# Patient Record
Sex: MALE | Race: WHITE | NOT HISPANIC OR LATINO | Employment: OTHER | URBAN - NONMETROPOLITAN AREA
[De-identification: names, ages, dates, MRNs, and addresses within clinical notes are randomized per-mention and may not be internally consistent; named-entity substitution may affect disease eponyms.]

---

## 2022-05-16 ENCOUNTER — APPOINTMENT (EMERGENCY)
Dept: RADIOLOGY | Facility: HOSPITAL | Age: 82
End: 2022-05-16
Payer: COMMERCIAL

## 2022-05-16 ENCOUNTER — HOSPITAL ENCOUNTER (EMERGENCY)
Facility: HOSPITAL | Age: 82
Discharge: HOME | End: 2022-05-16
Attending: EMERGENCY MEDICINE | Admitting: EMERGENCY MEDICINE
Payer: COMMERCIAL

## 2022-05-16 VITALS
HEART RATE: 85 BPM | TEMPERATURE: 98.8 F | WEIGHT: 297 LBS | SYSTOLIC BLOOD PRESSURE: 152 MMHG | DIASTOLIC BLOOD PRESSURE: 70 MMHG | OXYGEN SATURATION: 94 % | RESPIRATION RATE: 16 BRPM

## 2022-05-16 DIAGNOSIS — J06.9 UPPER RESPIRATORY TRACT INFECTION, UNSPECIFIED TYPE: Primary | ICD-10-CM

## 2022-05-16 LAB
FLUAV RNA RESP QL NAA+PROBE: NEGATIVE
FLUBV RNA RESP QL NAA+PROBE: NEGATIVE
RSV RNA RESP QL NAA+PROBE: NEGATIVE
SARS-COV-2 RNA RESP QL NAA+PROBE: NEGATIVE

## 2022-05-16 PROCEDURE — 71045 X-RAY EXAM CHEST 1 VIEW: CPT

## 2022-05-16 PROCEDURE — 99284 EMERGENCY DEPT VISIT MOD MDM: CPT

## 2022-05-16 PROCEDURE — 0241U HB NFCT DS VIR RESP RNA 4 TRGT: CPT | Performed by: EMERGENCY MEDICINE

## 2022-05-16 PROCEDURE — 99285 EMERGENCY DEPT VISIT HI MDM: CPT | Performed by: EMERGENCY MEDICINE

## 2022-05-16 RX ORDER — LISINOPRIL 10 MG/1
10 TABLET ORAL DAILY
COMMUNITY
Start: 2021-11-03 | End: 2022-11-03

## 2022-05-16 RX ORDER — ASPIRIN 81 MG/1
81 TABLET ORAL DAILY
COMMUNITY

## 2022-05-16 NOTE — ED PROVIDER NOTES
History  Chief Complaint   Patient presents with    Fever - 75 years or older     Fever, arthralgia, beginning last night  Recent covid exposure  Wife report was hallucinating last night      Patient recently exposed to COVID positive family member complains of subjective fevers, warmth, generalized weakness, body aches since last night  States mostly symptoms are resolved  Presents to ED for COVID testing      History provided by:  Patient and spouse   used: No    Fever - 75 years or older  Temp source:  Subjective  Severity:  Moderate  Onset quality:  Gradual  Timing:  Constant  Progression:  Improving  Chronicity:  New  Relieved by:  Nothing  Worsened by:  Nothing  Ineffective treatments:  None tried  Associated symptoms: chills and myalgias    Associated symptoms: no chest pain, no cough, no diarrhea, no dysuria, no ear pain, no headaches, no nausea, no rash, no sore throat and no vomiting        Prior to Admission Medications   Prescriptions Last Dose Informant Patient Reported? Taking?   aspirin (ECOTRIN LOW STRENGTH) 81 mg EC tablet   Yes Yes   Sig: Take 81 mg by mouth in the morning  lisinopril (ZESTRIL) 10 mg tablet   Yes Yes   Sig: Take 10 mg by mouth in the morning  metFORMIN (GLUCOPHAGE) 1000 MG tablet   Yes Yes   Sig: Take 1,000 mg by mouth in the morning and 1,000 mg before bedtime  Facility-Administered Medications: None       Past Medical History:   Diagnosis Date    Diabetes mellitus ([facility] [address] )     Hypertension        History reviewed  No pertinent surgical history  History reviewed  No pertinent family history  I have reviewed and agree with the history as documented      E-Cigarette/Vaping     E-Cigarette/Vaping Substances     Social History     Tobacco Use    Smoking status: Former Smoker    Smokeless tobacco: Never Used   Substance Use Topics    Alcohol use: Not Currently    Drug use: Not Currently       Review of Systems   Constitutional: Positive for chills, fatigue and fever  HENT: Negative for ear pain, hearing loss, sore throat, trouble swallowing and voice change  Eyes: Negative for pain and discharge  Respiratory: Negative for cough, shortness of breath and wheezing  Cardiovascular: Negative for chest pain and palpitations  Gastrointestinal: Negative for abdominal pain, blood in stool, constipation, diarrhea, nausea and vomiting  Genitourinary: Negative for dysuria, flank pain, frequency and hematuria  Musculoskeletal: Positive for myalgias  Negative for joint swelling, neck pain and neck stiffness  Skin: Negative for rash and wound  Neurological: Negative for dizziness, seizures, syncope, facial asymmetry and headaches  Psychiatric/Behavioral: Negative for hallucinations, self-injury and suicidal ideas  All other systems reviewed and are negative  Physical Exam  Physical Exam  Vitals and nursing note reviewed  Constitutional:       General: He is not in acute distress  Appearance: He is well-developed  He is obese  HENT:      Head: Normocephalic and atraumatic  Right Ear: External ear normal       Left Ear: External ear normal    Eyes:      General: No scleral icterus  Right eye: No discharge  Left eye: No discharge  Extraocular Movements: Extraocular movements intact  Conjunctiva/sclera: Conjunctivae normal    Cardiovascular:      Rate and Rhythm: Normal rate and regular rhythm  Heart sounds: Normal heart sounds  No murmur heard  Pulmonary:      Effort: Pulmonary effort is normal       Breath sounds: Normal breath sounds  No wheezing or rales  Abdominal:      General: Bowel sounds are normal  There is no distension  Palpations: Abdomen is soft  Tenderness: There is no abdominal tenderness  There is no guarding or rebound  Musculoskeletal:         General: No deformity  Normal range of motion  Cervical back: Normal range of motion and neck supple     Skin:     General: Skin is warm and dry  Findings: No rash  Neurological:      General: No focal deficit present  Mental Status: He is alert and oriented to person, place, and time  Cranial Nerves: No cranial nerve deficit  Psychiatric:         Mood and Affect: Mood normal          Behavior: Behavior normal          Thought Content: Thought content normal          Judgment: Judgment normal          Vital Signs  ED Triage Vitals   Temperature Pulse Respirations Blood Pressure SpO2   05/16/22 1409 05/16/22 1409 05/16/22 1409 05/16/22 1409 05/16/22 1409   98 8 °F (37 1 °C) 95 16 160/89 93 %      Temp Source Heart Rate Source Patient Position - Orthostatic VS BP Location FiO2 (%)   05/16/22 1409 05/16/22 1445 05/16/22 1409 05/16/22 1409 --   Temporal Monitor Lying Right arm       Pain Score       05/16/22 1409       No Pain           Vitals:    05/16/22 1409 05/16/22 1445   BP: 160/89 152/70   Pulse: 95 85   Patient Position - Orthostatic VS: Lying Lying         Visual Acuity  Visual Acuity    Flowsheet Row Most Recent Value   L Pupil Size (mm) 3   R Pupil Size (mm) 3          ED Medications  Medications - No data to display    Diagnostic Studies  Results Reviewed     Procedure Component Value Units Date/Time    COVID19, Influenza A/B, RSV PCR, [facility] [[id number]]  (Normal) Collected: 05/16/22 1446    Lab Status: Final result Specimen: Nares from Nasopharyngeal Swab Updated: 05/16/22 1534     SARS-CoV-2 Negative     INFLUENZA A PCR Negative     INFLUENZA B PCR Negative     RSV PCR Negative    Narrative:      FOR PEDIATRIC PATIENTS - copy/paste COVID Guidelines URL to browser: [email or web address]  [employer]    SARS-CoV-2 assay is a Nucleic Acid Amplification assay intended for the  qualitative detection of nucleic acid from SARS-CoV-2 in nasopharyngeal  swabs  Results are for the presumptive identification of SARS-CoV-2 RNA      Positive results are indicative of infection with SARS-CoV-2, the virus  causing COVID-19, but do not rule out bacterial infection or co-infection  with other viruses  Laboratories within the [place] and its  territories are required to report all positive results to the appropriate  public health authorities  Negative results do not preclude SARS-CoV-2  infection and should not be used as the sole basis for treatment or other  patient management decisions  Negative results must be combined with  clinical observations, patient history, and epidemiological information  This test has not been FDA cleared or approved  This test has been authorized by FDA under an Emergency Use Authorization  (EUA)  This test is only authorized for the duration of time the  declaration that circumstances exist justifying the authorization of the  emergency use of an in vitro diagnostic tests for detection of SARS-CoV-2  virus and/or diagnosis of COVID-19 infection under section 564(b)(1) of  the Act, 21 U  S C  771DVV-1(D)(2), unless the authorization is terminated  or revoked sooner  The test has been validated but independent review by FDA  and CLIA is pending  Test performed using [employer] Gene[employer]pert: This RT-PCR assay targets N2,  a region unique to SARS-CoV-2  A conserved region in the E-gene was chosen  for pan-Sarbecovirus detection which includes SARS-CoV-2  XR chest portable   ED Interpretation by Gabriela Guzman MD (05/16 8001)   No acute finding                 Procedures  Procedures         ED Course  ED Course as of 05/16/22 1541   Mon May 16, 2022   1459 No hypoxia, hypotension, tachycardia  Patient has x-ray without acute infiltrates noted  Will await result of COVID testing, plan on discharge  [address] [facility] negative  Stable for discharge  Patient advised to test at home again tomorrow to confirm negative status                                 SBIRT 22yo+    Flowsheet Row Most Recent Value   SBIRT (23 yo +)    In order to provide better care to our patients, we are screening all of our patients for alcohol and drug use  Would it be okay to ask you these screening questions? Yes Filed at: 05/16/2022 1440   Initial Alcohol Screen: US AUDIT-C     1  How often do you have a drink containing alcohol? 0 Filed at: 05/16/2022 1440   2  How many drinks containing alcohol do you have on a typical day you are drinking? 0 Filed at: 05/16/2022 1440   3b  FEMALE Any Age, or MALE 65+: How often do you have 4 or more drinks on one occassion? 0 Filed at: 05/16/2022 1440   Audit-C Score 0 Filed at: 05/16/2022 1440   SENTHIL: How many times in the past year have you    Used an illegal drug or used a prescription medication for non-medical reasons? Never Filed at: 05/16/2022 1440                    MDM  Number of Diagnoses or Management Options     Amount and/or Complexity of Data Reviewed  Clinical lab tests: ordered and reviewed  Tests in the radiology section of CPT®: ordered and reviewed        Disposition  Final diagnoses:   Upper respiratory tract infection, unspecified type     Time reflects when diagnosis was documented in both MDM as applicable and the Disposition within this note     Time User Action Codes Description Comment    5/16/2022  3:41 PM Edwige Mckay Add [J06 9] Upper respiratory tract infection, unspecified type       ED Disposition     ED Disposition   Discharge    Condition   Stable    Date/Time   Mon May 16, 2022  3:41 PM    Comment   Alex Route discharge to home/self care  Follow-up Information     Follow up With Specialties Details Why [address] Corrine Rossi MD Family Medicine   [address] [place] [address]  [address]  [address] [place] [address]  [phone]            Patient's Medications   Discharge Prescriptions    No medications on file       No discharge procedures on file      PDMP Review     None          ED Provider  Electronically Signed by           Shy Julien MD  05/16/22 5215

## 2022-11-28 ENCOUNTER — HOSPITAL ENCOUNTER (OUTPATIENT)
Dept: RADIOLOGY | Facility: CLINIC | Age: 82
Discharge: HOME | End: 2022-11-28

## 2022-11-28 ENCOUNTER — OFFICE VISIT (OUTPATIENT)
Dept: URGENT CARE | Facility: CLINIC | Age: 82
End: 2022-11-28

## 2022-11-28 VITALS
BODY MASS INDEX: 42.37 KG/M2 | SYSTOLIC BLOOD PRESSURE: 164 MMHG | OXYGEN SATURATION: 92 % | HEIGHT: 70 IN | DIASTOLIC BLOOD PRESSURE: 79 MMHG | RESPIRATION RATE: 16 BRPM | HEART RATE: 71 BPM | TEMPERATURE: 97.1 F | WEIGHT: 296 LBS

## 2022-11-28 DIAGNOSIS — B34.9 VIRAL SYNDROME: Primary | ICD-10-CM

## 2022-11-28 DIAGNOSIS — R50.9 FEVER, UNSPECIFIED FEVER CAUSE: ICD-10-CM

## 2022-11-28 RX ORDER — ALBUTEROL SULFATE 90 UG/1
2 AEROSOL, METERED RESPIRATORY (INHALATION) EVERY 6 HOURS PRN
Qty: 8.5 G | Refills: 0 | Status: SHIPPED | OUTPATIENT
Start: 2022-11-28

## 2022-11-28 NOTE — PROGRESS NOTES
Pt unsure of medications and does not present with medication list to this visit; medication reconciliation unable to be performed at this time

## 2022-11-28 NOTE — PROGRESS NOTES
[facility]        NAME: Yee Zuniga is a 78 y o  male  : 1940    MRN: [id number]  DATE: 2022  TIME: 1:21 PM    Assessment and Plan   Viral syndrome [B34 9]  1  Viral syndrome  Cov/Flu-[facility] at [facility]    XR chest pa & lateral    albuterol (ProAir HFA) 90 mcg/act inhaler        Ambulatory pulse ox performed by nursing staff and SPO2 92% RA  Pulmonary chest x-ray read negative for pneumonia, final read pending  COVID/Flu PCR pending  Results will be viewable via [employer]  Follow CDC guidelines for isolation/quarantine until results back and then as appropriate based on final results  Albuterol inhaler prescribed and to be used as needed for wheezing  Patient with history of diabetes and so cannot prescribe oral steroids  Follow-up with PCP in 2-3 days and closely monitor symptoms  Proceed to ER if symptoms worsen  Low threshold for referral to ED  Son verbalized understanding of instructions given  Patient Instructions     Patient Instructions     COVID/Flu PCR pending  Results will be viewable via [employer]  Follow CDC guidelines for isolation/quarantine until results back and then as appropriate  Use albuterol inhaler as prescribed for wheezing/cough/shortness of breath  Continue with supportive measures, OTC Tylenol/Ibuprofen, nasal decongestants/cough suppressants   Cool mist humidifiers, increased fluid intake and rest   Follow up with PCP in 2-3 days  Present to ER if symptoms worsen  Viral Syndrome   AMBULATORY CARE:   Viral syndrome  is a term used for symptoms of an infection caused by a virus  Viruses are spread easily from person to person through the air and on shared items  Signs and symptoms  may start slowly or suddenly and last hours to days  They can be mild to severe and can change over days or hours   You may have any of the following:  · Fever and chills    · A runny or stuffy nose    · Cough, sore throat, or hoarseness    · Headache, or pain and pressure around your eyes    · Muscle aches and joint pain    · Shortness of breath or wheezing    · Abdominal pain, cramps, and diarrhea    · Nausea, vomiting, or loss of appetite    Call your local emergency number (911 in the [address] [facility]) or have someone else call if:   · You have a seizure  · You cannot be woken  · You have chest pain or trouble breathing  Seek care immediately if:   · You have a stiff neck, a bad headache, and sensitivity to light  · You feel weak, dizzy, or confused  · You stop urinating or urinate a lot less than usual     · You cough up blood or thick yellow or green mucus  · You have severe abdominal pain or your abdomen is larger than usual     Call your doctor if:   · Your symptoms do not get better with treatment or get worse after 3 days  · You have a rash or ear pain  · You have burning when you urinate  · You have questions or concerns about your condition or care  Treatment for viral syndrome  may include medicines to manage your symptoms  Antibiotics are not given for a viral infection  You may  need any of the following:  · Acetaminophen  decreases pain and fever  It is available without a doctor's order  Ask how much to take and how often to take it  Follow directions  Read the labels of all other medicines you are using to see if they also contain acetaminophen, or ask your doctor or pharmacist  Acetaminophen can cause liver damage if not taken correctly  Do not use more than 4 grams (4,000 milligrams) total of acetaminophen in one day  · NSAIDs , such as ibuprofen, help decrease swelling, pain, and fever  NSAIDs can cause stomach bleeding or kidney problems in certain people  If you take blood thinner medicine, always ask your healthcare provider if NSAIDs are safe for you  Always read the medicine label and follow directions      · Cold medicine  helps decrease swelling, control a cough, and relieve chest or nasal congestion  · Saline nasal spray  helps decrease nasal congestion  Manage your symptoms:   · Drink liquids as directed to prevent dehydration  Ask how much liquid to drink each day and which liquids are best for you  Ask if you should drink an oral rehydration solution (ORS)  An ORS has the right amounts of water, salts, and sugar you need to replace body fluids  This may help prevent dehydration caused by vomiting or diarrhea  Do not drink liquids with caffeine  Liquids with caffeine can make dehydration worse  · Get plenty of rest to help your body heal   Take naps throughout the day  Ask your healthcare provider when you can return to work and your normal activities  · Use a cool mist humidifier to help you breathe easier  Ask your healthcare provider how to use a cool mist humidifier  · Eat honey or use cough drops for a sore throat  Cough drops are available without a doctor's order  Follow directions for taking cough drops  · Do not smoke or be close to anyone who is smoking  Nicotine and other chemicals in cigarettes and cigars can cause lung damage  Smoking can also delay healing  Ask your healthcare provider for information if you currently smoke and need help to quit  E-cigarettes or smokeless tobacco still contain nicotine  Talk to your healthcare provider before you use these products  Prevent the spread of germs:       1  Wash your hands often  Wash your hands several times each day  Wash after you use the bathroom, change a child's diaper, and before you prepare or eat food  Use soap and water every time  Rub your soapy hands together, lacing your fingers  Wash the front and back of your hands, and in between your fingers  Use the fingers of one hand to scrub under the fingernails of the other hand  Wash for at least 20 seconds  Rinse with warm, running water for several seconds  Then dry your hands with a clean towel or paper towel   Use hand  that contains alcohol if soap and water are not available  Do not touch your eyes, nose, or mouth without washing your hands first          2  Cover a sneeze or cough  Use a tissue that covers your mouth and nose  Throw the tissue away in a trash can right away  Use the bend of your arm if a tissue is not available  Wash your hands well with soap and water or use a hand   3  Stay away from others while you are sick  Avoid crowds as much as possible  4  Ask about vaccines you may need  Talk to your healthcare provider about your vaccine history  He or she will tell you which vaccines you need, and when to get them  ? Get the influenza (flu) vaccine as soon as recommended each year  The flu vaccine is available starting in September or October  Flu viruses change, so it is important to get a flu vaccine every year  ? Get the pneumonia vaccine if recommended  This vaccine is usually recommended every 5 years  Your provider will tell you when to get this vaccine, if needed  Follow up with your doctor as directed:  Write down your questions so you remember to ask them during your visits  © Copyright [employer] 2022 Information is for End User's use only and may not be sold, redistributed or otherwise used for commercial purposes  All illustrations and images included in CareNotes® are the copyrighted property of [employer]  or [address] [place] [address]  The above information is an  only  It is not intended as medical advice for individual conditions or treatments  Talk to your doctor, nurse or pharmacist before following any medical regimen to see if it is safe and effective for you              Chief Complaint     Chief Complaint   Patient presents with   • Cold Like Symptoms     C/o fever, chills, body aches, congestion, and cough for past 10 days; living with residents who tested positive for Influenza A approximately 3 weeks ago         History of Present Illness       39-year-old male with a past medical history significant for HTN and Type II DM presents with his son with complaints productive cough, nasal congestion, body aches, chills, generalized weakness, and fever x10 days  Patient also states some shortness of breath with ambulation  T-max 101  He has had decreased appetite but is drinking well  Some diarrhea but denies any vomiting  Son states that other family members tested positive for influenza about 3 weeks ago  Patient has been taking OTC TheraFlu as well as NyQuil-DayQuil and Tylenol for his symptoms  He is a former smoker  Review of Systems   Review of Systems   Constitutional: Positive for appetite change, chills and fever  HENT: Positive for congestion  Negative for ear discharge, ear pain, sore throat and trouble swallowing  Eyes: Negative for discharge  Respiratory: Positive for cough, shortness of breath and wheezing  Cardiovascular: Negative for chest pain  Gastrointestinal: Positive for diarrhea  Negative for abdominal pain, nausea and vomiting  Musculoskeletal: Positive for myalgias  Skin: Negative for rash  Current Medications       Current Outpatient Medications:   •  albuterol (ProAir HFA) 90 mcg/act inhaler, Inhale 2 puffs every 6 (six) hours as needed for wheezing or shortness of breath, Disp: 8 5 g, Rfl: 0  •  aspirin (ECOTRIN LOW STRENGTH) 81 mg EC tablet, Take 81 mg by mouth in the morning , Disp: , Rfl:   •  lisinopril (ZESTRIL) 10 mg tablet, Take 10 mg by mouth in the morning , Disp: , Rfl:   •  metFORMIN (GLUCOPHAGE) 1000 MG tablet, Take 1,000 mg by mouth in the morning and 1,000 mg before bedtime  , Disp: , Rfl:     Current Allergies     Allergies as of 11/28/2022   • (No Known Allergies)            The following portions of the patient's history were reviewed and updated as appropriate: allergies, current medications, past family history, past medical history, past social history, past surgical history and problem list      Past Medical History:   Diagnosis Date   • Diabetes mellitus ([facility] [address] )    • Hypertension        History reviewed  No pertinent surgical history  Family History   Problem Relation Age of Onset   • No Known Problems Mother    • No Known Problems Father          Medications have been verified  Objective   /79   Pulse 71   Temp (!) 97 1 °F (36 2 °C)   Resp 16   Ht 5' 10" (1 778 m)   Wt 134 kg (296 lb)   SpO2 92% Comment: During ambulation  BMI 42 47 kg/m²   No LMP for male patient  Physical Exam     Physical Exam  Vitals and nursing note reviewed  Constitutional:       General: He is not in acute distress  Appearance: He is not toxic-appearing  HENT:      Head: Normocephalic  Right Ear: Tympanic membrane, ear canal and external ear normal       Left Ear: Tympanic membrane, ear canal and external ear normal       Nose: Congestion present  Mouth/Throat:      Mouth: Mucous membranes are moist       Comments: Thick, green sputum in posterior pharynx  Eyes:      Conjunctiva/sclera: Conjunctivae normal    Cardiovascular:      Rate and Rhythm: Normal rate and regular rhythm  Heart sounds: Normal heart sounds  Pulmonary:      Effort: Pulmonary effort is normal  No accessory muscle usage or respiratory distress  Breath sounds: Examination of the right-upper field reveals wheezing  Examination of the left-upper field reveals wheezing  Examination of the right-lower field reveals wheezing  Examination of the left-lower field reveals wheezing  Wheezing present  Lymphadenopathy:      Cervical: No cervical adenopathy  Skin:     General: Skin is warm and dry  Coloration: Skin is pale  Neurological:      Mental Status: He is alert and oriented to person, place, and time  Gait: Gait is intact     Psychiatric:         Mood and Affect: Mood normal          Behavior: Behavior normal

## 2022-11-28 NOTE — PATIENT INSTRUCTIONS
COVID/Flu PCR pending  Results will be viewable via [employer]  Follow CDC guidelines for isolation/quarantine until results back and then as appropriate  Use albuterol inhaler as prescribed for wheezing/cough/shortness of breath  Continue with supportive measures, OTC Tylenol/Ibuprofen, nasal decongestants/cough suppressants   Cool mist humidifiers, increased fluid intake and rest   Follow up with PCP in 2-3 days  Present to ER if symptoms worsen  Viral Syndrome   AMBULATORY CARE:   Viral syndrome  is a term used for symptoms of an infection caused by a virus  Viruses are spread easily from person to person through the air and on shared items  Signs and symptoms  may start slowly or suddenly and last hours to days  They can be mild to severe and can change over days or hours  You may have any of the following:  Fever and chills    A runny or stuffy nose    Cough, sore throat, or hoarseness    Headache, or pain and pressure around your eyes    Muscle aches and joint pain    Shortness of breath or wheezing    Abdominal pain, cramps, and diarrhea    Nausea, vomiting, or loss of appetite    Call your local emergency number (911 in the [facility]) or have someone else call if:   You have a seizure  You cannot be woken  You have chest pain or trouble breathing  Seek care immediately if:   You have a stiff neck, a bad headache, and sensitivity to light  You feel weak, dizzy, or confused  You stop urinating or urinate a lot less than usual     You cough up blood or thick yellow or green mucus  You have severe abdominal pain or your abdomen is larger than usual     Call your doctor if:   Your symptoms do not get better with treatment or get worse after 3 days  You have a rash or ear pain  You have burning when you urinate  You have questions or concerns about your condition or care  Treatment for viral syndrome  may include medicines to manage your symptoms   Antibiotics are not given for a viral infection  You may  need any of the following:  Acetaminophen  decreases pain and fever  It is available without a doctor's order  Ask how much to take and how often to take it  Follow directions  Read the labels of all other medicines you are using to see if they also contain acetaminophen, or ask your doctor or pharmacist  Acetaminophen can cause liver damage if not taken correctly  Do not use more than 4 grams (4,000 milligrams) total of acetaminophen in one day  NSAIDs , such as ibuprofen, help decrease swelling, pain, and fever  NSAIDs can cause stomach bleeding or kidney problems in certain people  If you take blood thinner medicine, always ask your healthcare provider if NSAIDs are safe for you  Always read the medicine label and follow directions  Cold medicine  helps decrease swelling, control a cough, and relieve chest or nasal congestion  Saline nasal spray  helps decrease nasal congestion  Manage your symptoms:   Drink liquids as directed to prevent dehydration  Ask how much liquid to drink each day and which liquids are best for you  Ask if you should drink an oral rehydration solution (ORS)  An ORS has the right amounts of water, salts, and sugar you need to replace body fluids  This may help prevent dehydration caused by vomiting or diarrhea  Do not drink liquids with caffeine  Liquids with caffeine can make dehydration worse  Get plenty of rest to help your body heal   Take naps throughout the day  Ask your healthcare provider when you can return to work and your normal activities  Use a cool mist humidifier to help you breathe easier  Ask your healthcare provider how to use a cool mist humidifier  Eat honey or use cough drops for a sore throat  Cough drops are available without a doctor's order  Follow directions for taking cough drops  Do not smoke or be close to anyone who is smoking  Nicotine and other chemicals in cigarettes and cigars can cause lung damage   Smoking can also delay healing  Ask your healthcare provider for information if you currently smoke and need help to quit  E-cigarettes or smokeless tobacco still contain nicotine  Talk to your healthcare provider before you use these products  Prevent the spread of germs:       Wash your hands often  Wash your hands several times each day  Wash after you use the bathroom, change a child's diaper, and before you prepare or eat food  Use soap and water every time  Rub your soapy hands together, lacing your fingers  Wash the front and back of your hands, and in between your fingers  Use the fingers of one hand to scrub under the fingernails of the other hand  Wash for at least 20 seconds  Rinse with warm, running water for several seconds  Then dry your hands with a clean towel or paper towel  Use hand  that contains alcohol if soap and water are not available  Do not touch your eyes, nose, or mouth without washing your hands first          Cover a sneeze or cough  Use a tissue that covers your mouth and nose  Throw the tissue away in a trash can right away  Use the bend of your arm if a tissue is not available  Wash your hands well with soap and water or use a hand   Stay away from others while you are sick  Avoid crowds as much as possible  Ask about vaccines you may need  Talk to your healthcare provider about your vaccine history  He or she will tell you which vaccines you need, and when to get them  Get the influenza (flu) vaccine as soon as recommended each year  The flu vaccine is available starting in September or October  Flu viruses change, so it is important to get a flu vaccine every year  Get the pneumonia vaccine if recommended  This vaccine is usually recommended every 5 years  Your provider will tell you when to get this vaccine, if needed  Follow up with your doctor as directed:  Write down your questions so you remember to ask them during your visits    © Copyright [employer] 2022 Information is for [employer] use only and may not be sold, redistributed or otherwise used for commercial purposes  All illustrations and images included in CareNotes® are the copyrighted property of [employer]  or [employer]  The above information is an  only  It is not intended as medical advice for individual conditions or treatments  Talk to your doctor, nurse or pharmacist before following any medical regimen to see if it is safe and effective for you

## 2022-11-29 LAB
FLUAV RNA RESP QL NAA+PROBE: POSITIVE
FLUBV RNA RESP QL NAA+PROBE: NEGATIVE
SARS-COV-2 RNA RESP QL NAA+PROBE: NEGATIVE

## 2023-08-17 ENCOUNTER — OFFICE VISIT (OUTPATIENT)
Dept: URGENT CARE | Facility: CLINIC | Age: 83
End: 2023-08-17
Payer: MEDICARE

## 2023-08-17 VITALS
HEIGHT: 70 IN | TEMPERATURE: 98 F | OXYGEN SATURATION: 99 % | DIASTOLIC BLOOD PRESSURE: 90 MMHG | BODY MASS INDEX: 41.52 KG/M2 | HEART RATE: 78 BPM | RESPIRATION RATE: 16 BRPM | SYSTOLIC BLOOD PRESSURE: 134 MMHG | WEIGHT: 290 LBS

## 2023-08-17 DIAGNOSIS — H69.93 DISORDER OF BOTH EUSTACHIAN TUBES: Primary | ICD-10-CM

## 2023-08-17 PROCEDURE — G0463 HOSPITAL OUTPT CLINIC VISIT: HCPCS

## 2023-08-17 PROCEDURE — 99213 OFFICE O/P EST LOW 20 MIN: CPT

## 2023-08-17 RX ORDER — VALSARTAN 40 MG/1
TABLET ORAL
COMMUNITY
Start: 2022-06-15

## 2023-08-17 RX ORDER — METOPROLOL SUCCINATE 50 MG/1
TABLET, EXTENDED RELEASE ORAL
COMMUNITY
Start: 2023-08-16

## 2023-08-17 RX ORDER — LEVOTHYROXINE SODIUM 0.2 MG/1
TABLET ORAL
COMMUNITY
Start: 2023-06-07

## 2023-08-17 RX ORDER — TAMSULOSIN HYDROCHLORIDE 0.4 MG/1
0.4 CAPSULE ORAL DAILY
COMMUNITY

## 2023-08-17 RX ORDER — OMEPRAZOLE 40 MG/1
CAPSULE, DELAYED RELEASE ORAL
COMMUNITY
Start: 2023-08-02

## 2023-08-17 NOTE — PROGRESS NOTES
[facility]        NAME: Jessica Ventura is a 78 y.o. male  : 1940    MRN: [id number]  DATE: 2023  TIME: 4:54 PM    Assessment and Plan   Disorder of both eustachian tubes [H69.93]  1. Disorder of both eustachian tubes          No sign of OM, OE, or cerumen impactions. Symptoms likely related to eustachian tube dysfunction following previous viral illness. Encouraged continued supportive measures, including daily Flonase nasal spray. Follow up with ENT as scheduled. Follow up with PCP in 3-5 days or proceed to emergency department for worsening symptoms. Patient and wife verbalized understanding of instructions given. Patient Instructions     Patient Instructions       No signs of ear infection or earwax blockage  Use daily Flonase nasal spray  Follow up with ENT as scheduled   Follow up with PCP in 3-5 days. Proceed to  ER if symptoms worsen. Eustachian Tube Dysfunction   AMBULATORY CARE:   Eustachian tube dysfunction (ETD)  is a condition that prevents your eustachian tubes from opening properly. It can also cause them to become blocked. Eustachian tubes connect your middle ear to the back of your nose and throat. These tubes open and allow air to flow in and out when you sneeze, swallow, or yawn. Common signs and symptoms include the following:   • Fullness or pressure in your ears    • Muffled hearing, or a feeling you are hearing under water or have clogged ears    • Pain in one or both ears    • Ringing in your ears    • Popping, crackling, or clicking feeling in your ears    • Trouble keeping your balance    Call your doctor or otolaryngologist if:   • Your symptoms do not improve or get worse. • You have a fever. • You have any hearing loss. • You have questions or concerns about your condition or care. Treatment:  ETD may get better on its own without any treatment.  If it continues, you may need any of the following:  • Swallow, yawn, or chew gum  to help open your eustachian tubes. Your healthcare provider may also recommend you blow with your mouth shut and your nostrils pinched closed. • Air pressure devices  push air into your nose and eustachian tubes to help relieve air pressure in your ear. • Treatment for allergies  such as decongestants, antihistamines, and nasal steroids may improve ETD. They may help decrease swelling of the eustachian tubes. • A myringotomy  is surgery to make a hole in your eardrum. The hole relieves pressure and lets fluid drain from your ear. A pressure equalizing (PE) tube may be used to keep the hole open and to help drain fluid. • Tuboplasty  is a procedure to widen your eustachian tubes. Follow up with your doctor or otolaryngologist as directed:  Write down your questions so you remember to ask them during your visits. © Copyright [employer] 2022 Information is for End User's use only and may not be sold, redistributed or otherwise used for commercial purposes. The above information is an  only. It is not intended as medical advice for individual conditions or treatments. Talk to your doctor, nurse or pharmacist before following any medical regimen to see if it is safe and effective for you. Chief Complaint     Chief Complaint   Patient presents with   • ears blocked     Hearing normally bad but now he cannot hear anything         History of Present Illness       78-year-old male with a past medical history significant for type II DM and hypertension presents with complaints of decreased hearing x1 month. Wife reporting poor hearing at baseline however acutely worsened following viral illness with symptoms such as nasal congestion and cough. He denies URI symptoms at present time or ear drainage. He is scheduled to see ENT next month. Review of Systems   Review of Systems   Constitutional: Negative for chills and fever. HENT: Positive for hearing loss.  Negative for congestion, ear discharge, ear pain, rhinorrhea, sore throat, trouble swallowing and voice change. Eyes: Negative for discharge. Respiratory: Negative for cough and shortness of breath. Cardiovascular: Negative for chest pain. Gastrointestinal: Negative for abdominal pain, diarrhea, nausea and vomiting. Musculoskeletal: Negative for myalgias. Skin: Negative for rash. Neurological: Negative for dizziness, light-headedness and headaches. Current Medications       Current Outpatient Medications:   •  albuterol (ProAir HFA) 90 mcg/act inhaler, Inhale 2 puffs every 6 (six) hours as needed for wheezing or shortness of breath, Disp: 8.5 g, Rfl: 0  •  aspirin (ECOTRIN LOW STRENGTH) 81 mg EC tablet, Take 81 mg by mouth in the morning., Disp: , Rfl:   •  levothyroxine 200 mcg tablet, , Disp: , Rfl:   •  lisinopril (ZESTRIL) 10 mg tablet, Take 10 mg by mouth in the morning., Disp: , Rfl:   •  metFORMIN (GLUCOPHAGE) 1000 MG tablet, Take 1,000 mg by mouth in the morning and 1,000 mg before bedtime. , Disp: , Rfl:   •  metoprolol succinate (TOPROL-XL) 50 mg 24 hr tablet, , Disp: , Rfl:   •  omeprazole (PriLOSEC) 40 MG capsule, , Disp: , Rfl:   •  tamsulosin (FLOMAX) 0.4 mg, Take 0.4 mg by mouth daily, Disp: , Rfl:   •  valsartan (DIOVAN) 40 mg tablet, , Disp: , Rfl:     Current Allergies     Allergies as of 08/17/2023   • (No Known Allergies)            The following portions of the patient's history were reviewed and updated as appropriate: allergies, current medications, past family history, past medical history, past social history, past surgical history and problem list.     Past Medical History:   Diagnosis Date   • Diabetes mellitus ([address])    • GERD (gastroesophageal reflux disease)    • Hypertension        Past Surgical History:   Procedure Laterality Date   • HERNIA REPAIR         Family History   Problem Relation Age of Onset   • Heart disease Mother    • Heart disease Father          Medications have been verified. Objective   /90   Pulse 78   Temp 98 °F (36.7 °C)   Resp 16   Ht 5' 10" (1.778 m)   Wt 132 kg (290 lb)   SpO2 99%   BMI 41.61 kg/m²   No LMP for male patient. Physical Exam     Physical Exam  Vitals and nursing note reviewed. Constitutional:       General: He is not in acute distress. Appearance: He is not toxic-appearing. HENT:      Head: Normocephalic. Right Ear: Ear canal and external ear normal. A middle ear effusion is present. There is no impacted cerumen. Left Ear: Ear canal and external ear normal. A middle ear effusion is present. There is no impacted cerumen. Nose: Nose normal.      Mouth/Throat:      Mouth: Mucous membranes are moist.      Pharynx: Oropharynx is clear. Eyes:      Conjunctiva/sclera: Conjunctivae normal.   Cardiovascular:      Rate and Rhythm: Normal rate and regular rhythm. Heart sounds: Normal heart sounds. Pulmonary:      Effort: Pulmonary effort is normal. No respiratory distress. Breath sounds: Normal breath sounds. No stridor. No wheezing, rhonchi or rales. Skin:     General: Skin is warm and dry. Neurological:      Mental Status: He is alert and oriented to person, place, and time. Gait: Gait is intact.    Psychiatric:         Mood and Affect: Mood normal.         Behavior: Behavior normal.

## 2023-08-17 NOTE — PATIENT INSTRUCTIONS
No signs of ear infection or earwax blockage  Use daily Flonase nasal spray  Follow up with ENT as scheduled   Follow up with PCP in 3-5 days. Proceed to  ER if symptoms worsen. Eustachian Tube Dysfunction   AMBULATORY CARE:   Eustachian tube dysfunction (ETD)  is a condition that prevents your eustachian tubes from opening properly. It can also cause them to become blocked. Eustachian tubes connect your middle ear to the back of your nose and throat. These tubes open and allow air to flow in and out when you sneeze, swallow, or yawn. Common signs and symptoms include the following:   Fullness or pressure in your ears    Muffled hearing, or a feeling you are hearing under water or have clogged ears    Pain in one or both ears    Ringing in your ears    Popping, crackling, or clicking feeling in your ears    Trouble keeping your balance    Call your doctor or otolaryngologist if:   Your symptoms do not improve or get worse. You have a fever. You have any hearing loss. You have questions or concerns about your condition or care. Treatment:  ETD may get better on its own without any treatment. If it continues, you may need any of the following:  Swallow, yawn, or chew gum  to help open your eustachian tubes. Your healthcare provider may also recommend you blow with your mouth shut and your nostrils pinched closed. Air pressure devices  push air into your nose and eustachian tubes to help relieve air pressure in your ear. Treatment for allergies  such as decongestants, antihistamines, and nasal steroids may improve ETD. They may help decrease swelling of the eustachian tubes. A myringotomy  is surgery to make a hole in your eardrum. The hole relieves pressure and lets fluid drain from your ear. A pressure equalizing (PE) tube may be used to keep the hole open and to help drain fluid. Tuboplasty  is a procedure to widen your eustachian tubes.     Follow up with your doctor or otolaryngologist as directed:  Write down your questions so you remember to ask them during your visits. © Copyright [employer] 2022 Information is for End User's use only and may not be sold, redistributed or otherwise used for commercial purposes. The above information is an  only. It is not intended as medical advice for individual conditions or treatments. Talk to your doctor, nurse or pharmacist before following any medical regimen to see if it is safe and effective for you.

## 2023-11-27 ENCOUNTER — HOSPITAL ENCOUNTER (EMERGENCY)
Facility: HOSPITAL | Age: 83
Discharge: HOME | End: 2023-11-27
Attending: EMERGENCY MEDICINE
Payer: MEDICARE

## 2023-11-27 ENCOUNTER — APPOINTMENT (EMERGENCY)
Dept: RADIOLOGY | Facility: HOSPITAL | Age: 83
End: 2023-11-27
Payer: MEDICARE

## 2023-11-27 VITALS
TEMPERATURE: 97.5 F | BODY MASS INDEX: 42.33 KG/M2 | OXYGEN SATURATION: 94 % | SYSTOLIC BLOOD PRESSURE: 148 MMHG | DIASTOLIC BLOOD PRESSURE: 75 MMHG | HEART RATE: 56 BPM | RESPIRATION RATE: 18 BRPM | WEIGHT: 295 LBS

## 2023-11-27 DIAGNOSIS — J40 BRONCHITIS: ICD-10-CM

## 2023-11-27 DIAGNOSIS — I10 HYPERTENSION: ICD-10-CM

## 2023-11-27 DIAGNOSIS — R05.1 ACUTE COUGH: Primary | ICD-10-CM

## 2023-11-27 PROCEDURE — 71046 X-RAY EXAM CHEST 2 VIEWS: CPT

## 2023-11-27 PROCEDURE — 99283 EMERGENCY DEPT VISIT LOW MDM: CPT

## 2023-11-27 PROCEDURE — 0241U HB NFCT DS VIR RESP RNA 4 TRGT: CPT | Performed by: EMERGENCY MEDICINE

## 2023-11-27 PROCEDURE — 99284 EMERGENCY DEPT VISIT MOD MDM: CPT | Performed by: EMERGENCY MEDICINE

## 2023-11-27 RX ORDER — AZITHROMYCIN 250 MG/1
500 TABLET, FILM COATED ORAL ONCE
Status: COMPLETED | OUTPATIENT
Start: 2023-11-27 | End: 2023-11-27

## 2023-11-27 RX ORDER — METOPROLOL TARTRATE 50 MG/1
50 TABLET, FILM COATED ORAL ONCE
Status: COMPLETED | OUTPATIENT
Start: 2023-11-27 | End: 2023-11-27

## 2023-11-27 RX ORDER — AZITHROMYCIN 250 MG/1
250 TABLET, FILM COATED ORAL EVERY 24 HOURS
Qty: 4 TABLET | Refills: 0 | Status: SHIPPED | OUTPATIENT
Start: 2023-11-27 | End: 2023-12-01

## 2023-11-27 RX ORDER — BENZONATATE 200 MG/1
200 CAPSULE ORAL 3 TIMES DAILY PRN
Qty: 6 CAPSULE | Refills: 0 | Status: SHIPPED | OUTPATIENT
Start: 2023-11-27 | End: 2023-11-29

## 2023-11-27 RX ADMIN — AZITHROMYCIN 500 MG: 250 TABLET, FILM COATED ORAL at 17:11

## 2023-11-27 RX ADMIN — METOPROLOL TARTRATE 50 MG: 50 TABLET, FILM COATED ORAL at 17:11

## 2023-11-27 NOTE — DISCHARGE INSTRUCTIONS
Return to the ER immediately for any worsening symptoms. Please follow-up with your PCP for further evaluation and management. Recommend that you are compliant with your antihypertensive medications as prescribed.

## 2023-11-27 NOTE — ED PROVIDER NOTES
History  Chief Complaint   Patient presents with    Cough     Patient's wife is being seen as patient. ED doc noted patient was coughing excessively. Signing in for chest x-ray. 78-year-old male presents to the ED for evaluation of cough. Patient has had a cough for several weeks without any fever or chills. He is accompanied by his daughter who states that the cough is productive however the patient has not had any complaints of chest pain or recent travel. He is noted to have high blood pressure but states that he has not taken his blood pressure medications in several days. He denies any headache or any other complaints. Differential diagnosis includes but not limited to: Viral syndrome, pneumonia, bronchitis uncontrolled hypertension        Prior to Admission Medications   Prescriptions Last Dose Informant Patient Reported? Taking? albuterol (ProAir HFA) 90 mcg/act inhaler   No No   Sig: Inhale 2 puffs every 6 (six) hours as needed for wheezing or shortness of breath   aspirin (ECOTRIN LOW STRENGTH) 81 mg EC tablet   Yes No   Sig: Take 81 mg by mouth in the morning. levothyroxine 200 mcg tablet   Yes No   lisinopril (ZESTRIL) 10 mg tablet   Yes No   Sig: Take 10 mg by mouth in the morning. metFORMIN (GLUCOPHAGE) 1000 MG tablet   Yes No   Sig: Take 1,000 mg by mouth in the morning and 1,000 mg before bedtime.    metoprolol succinate (TOPROL-XL) 50 mg 24 hr tablet   Yes No   omeprazole (PriLOSEC) 40 MG capsule   Yes No   tamsulosin (FLOMAX) 0.4 mg   Yes No   Sig: Take 0.4 mg by mouth daily   valsartan (DIOVAN) 40 mg tablet   Yes No      Facility-Administered Medications: None       Past Medical History:   Diagnosis Date    Diabetes mellitus (HCC)     GERD (gastroesophageal reflux disease)     Hypertension        Past Surgical History:   Procedure Laterality Date    HERNIA REPAIR         Family History   Problem Relation Age of Onset    Heart disease Mother     Heart disease Father      I have reviewed and agree with the history as documented. E-Cigarette/Vaping    E-Cigarette Use Never User      E-Cigarette/Vaping Substances     Social History     Tobacco Use    Smoking status: Former    Smokeless tobacco: Never   Vaping Use    Vaping Use: Never used   Substance Use Topics    Alcohol use: Not Currently    Drug use: Never       Review of Systems   Constitutional:  Negative for chills and fever. HENT:  Negative for ear pain and sore throat. Eyes:  Negative for pain and visual disturbance. Respiratory:  Positive for cough. Negative for shortness of breath. Cardiovascular:  Negative for chest pain and palpitations. Gastrointestinal:  Negative for abdominal pain and vomiting. Genitourinary:  Negative for dysuria and hematuria. Musculoskeletal:  Negative for arthralgias and back pain. Skin:  Negative for color change and rash. Neurological:  Negative for seizures and syncope. All other systems reviewed and are negative. Physical Exam  Physical Exam  Vitals and nursing note reviewed. Constitutional:       General: He is not in acute distress. Appearance: Normal appearance. He is well-developed. He is obese. He is not ill-appearing, toxic-appearing or diaphoretic. HENT:      Head: Normocephalic and atraumatic. Right Ear: External ear normal.      Left Ear: External ear normal.      Nose: Nose normal. No congestion. Mouth/Throat:      Mouth: Mucous membranes are dry. Eyes:      General:         Right eye: No discharge. Left eye: No discharge. Extraocular Movements: Extraocular movements intact. Conjunctiva/sclera: Conjunctivae normal.   Cardiovascular:      Rate and Rhythm: Normal rate and regular rhythm. Heart sounds: No murmur heard. Pulmonary:      Effort: Pulmonary effort is normal. No respiratory distress. Breath sounds: No stridor. Wheezing present. No rhonchi or rales.       Comments: Mild expiratory wheeze in the left lower lung    Chest:      Chest wall: No tenderness. Abdominal:      General: Abdomen is flat. Bowel sounds are normal. There is no distension. Palpations: Abdomen is soft. There is no mass. Tenderness: There is no abdominal tenderness. There is no right CVA tenderness, left CVA tenderness, guarding or rebound. Hernia: No hernia is present. Musculoskeletal:         General: No swelling, tenderness, deformity or signs of injury. Normal range of motion. Cervical back: Normal range of motion and neck supple. No rigidity or tenderness. Right lower leg: No edema. Left lower leg: No edema. Lymphadenopathy:      Cervical: No cervical adenopathy. Skin:     General: Skin is warm and dry. Capillary Refill: Capillary refill takes less than 2 seconds. Coloration: Skin is not jaundiced or pale. Findings: No bruising, erythema, lesion or rash. Neurological:      General: No focal deficit present. Mental Status: He is alert and oriented to person, place, and time. Mental status is at baseline. Cranial Nerves: No cranial nerve deficit. Sensory: No sensory deficit. Motor: No weakness.       Gait: Gait normal.   Psychiatric:         Mood and Affect: Mood normal.         Behavior: Behavior normal.         Vital Signs  ED Triage Vitals [11/27/23 1631]   Temperature Pulse Respirations Blood Pressure SpO2   97.5 °F (36.4 °C) 62 18 (!) 187/101 96 %      Temp Source Heart Rate Source Patient Position - Orthostatic VS BP Location FiO2 (%)   Temporal Monitor Sitting Left arm --      Pain Score       No Pain           Vitals:    11/27/23 1631 11/27/23 1814   BP: (!) 187/101 148/75   Pulse: 62 56   Patient Position - Orthostatic VS: Sitting Sitting         Visual Acuity      ED Medications  Medications   azithromycin (ZITHROMAX) tablet 500 mg (500 mg Oral Given 11/27/23 1711)   metoprolol tartrate (LOPRESSOR) tablet 50 mg (50 mg Oral Given 11/27/23 1711)       Diagnostic Studies  Results Reviewed       Procedure Component Value Units Date/Time    COVID/FLU/RSV [[id number]]  (Normal) Collected: 11/27/23 1655    Lab Status: Final result Specimen: Nares from Nose Updated: 11/27/23 1736     SARS-CoV-2 Negative     INFLUENZA A PCR Negative     INFLUENZA B PCR Negative     RSV PCR Negative    Narrative:      FOR PEDIATRIC PATIENTS - copy/paste COVID Guidelines URL to browser: [email or web address]    SARS-CoV-2 assay is a Nucleic Acid Amplification assay intended for the  qualitative detection of nucleic acid from SARS-CoV-2 in nasopharyngeal  swabs. Results are for the presumptive identification of SARS-CoV-2 RNA. Positive results are indicative of infection with SARS-CoV-2, the virus  causing COVID-19, but do not rule out bacterial infection or co-infection  with other viruses. Laboratories within the [facility] and its  territories are required to report all positive results to the appropriate  public health authorities. Negative results do not preclude SARS-CoV-2  infection and should not be used as the sole basis for treatment or other  patient management decisions. Negative results must be combined with  clinical observations, patient history, and epidemiological information. This test has not been FDA cleared or approved. This test has been authorized by FDA under an Emergency Use Authorization  (EUA). This test is only authorized for the duration of time the  declaration that circumstances exist justifying the authorization of the  emergency use of an in vitro diagnostic tests for detection of SARS-CoV-2  virus and/or diagnosis of COVID-19 infection under section 564(b)(1) of  the Act, 21 U. S.C. 066JCZ-2(I)(7), unless the authorization is terminated  or revoked sooner. The test has been validated but independent review by FDA  and CLIA is pending. Test performed using [employer] GeneXpert:  This RT-PCR assay targets N2,  a region unique to SARS-CoV-2. A conserved region in the E-gene was chosen  for pan-Sarbecovirus detection which includes SARS-CoV-2. According to CMS-2020-01-R, this platform meets the definition of high-throughput technology. XR chest 2 views    (Results Pending)              Procedures  Procedures         ED Course  ED Course as of 11/27/23 1844   Mon Nov 27, 2023 1843 Patient had a stable ED course. He was given his usual blood pressure medicines with improvement in his blood pressure. He is not in any acute distress. My preliminary review of the x-ray in comparison to previous x-ray looks to be consistent with increased bronchial markings. For this reason patient was given Zithromax in the ED and discharged home with a course of Zithromax. I strongly encouraged the patient to take his medications as prescribed and to follow-up with his PCP for further evaluation and management. SBIRT 22yo+      Flowsheet Row Most Recent Value   Initial Alcohol Screen: US AUDIT-C     1. How often do you have a drink containing alcohol? 0 Filed at: 11/27/2023 1631   2. How many drinks containing alcohol do you have on a typical day you are drinking? 0 Filed at: 11/27/2023 1631   3a. Male UNDER 65: How often do you have five or more drinks on one occasion? 0 Filed at: 11/27/2023 1631   3b. FEMALE Any Age, or MALE 65+: How often do you have 4 or more drinks on one occassion? 0 Filed at: 11/27/2023 1631   Audit-C Score 0 Filed at: 11/27/2023 1631   SENTHIL: How many times in the past year have you. .. Used an illegal drug or used a prescription medication for non-medical reasons? Never Filed at: 11/27/2023 1631                      Medical Decision Making  Differential diagnosis includes but not limited to: Viral syndrome, pneumonia, bronchitis uncontrolled hypertension      Amount and/or Complexity of Data Reviewed  Radiology: ordered.     Risk  Prescription drug management. Disposition  Final diagnoses:   Acute cough   Bronchitis   Hypertension     Time reflects when diagnosis was documented in both MDM as applicable and the Disposition within this note       Time User Action Codes Description Comment    11/27/2023  6:35 PM Gray [place] [R05.1] Acute cough     11/27/2023  6:35 PM Lio Cash [J40] Bronchitis     11/27/2023  6:35 PM Pat Ansari Hypertension           ED Disposition       ED Disposition   Discharge    Condition   Stable    Date/Time   Mon Nov 27, 2023 [address] discharge to home/self care. Follow-up Information       Follow up With Specialties Details [facility] [address] [facility] [address] [place] [facility] In 1 week As needed \[facility]\""  [phone]              Patient's Medications   Discharge Prescriptions    AZITHROMYCIN (ZITHROMAX) 250 MG TABLET    Take 1 tablet (250 mg total) by mouth every 24 hours for 4 days       Start Date: 11/27/2023End Date: 12/1/2023       Order Dose: 250 mg       Quantity: 4 tablet    Refills: 0    BENZONATATE (TESSALON) 200 MG CAPSULE    Take 1 capsule (200 mg total) by mouth 3 (three) times a day as needed for cough for up to 2 days       Start Date: 11/27/2023End Date: 11/29/2023       Order Dose: 200 mg       Quantity: 6 capsule    Refills: 0       No discharge procedures on file.     PDMP Review       None            ED Provider  Electronically Signed by             Devin Ojeda DO  11/27/23 5936

## 2023-12-06 ENCOUNTER — APPOINTMENT (INPATIENT)
Dept: CT IMAGING | Facility: HOSPITAL | Age: 83
End: 2023-12-06
Payer: COMMERCIAL

## 2023-12-06 ENCOUNTER — APPOINTMENT (EMERGENCY)
Dept: CT IMAGING | Facility: HOSPITAL | Age: 83
End: 2023-12-06
Payer: COMMERCIAL

## 2023-12-06 ENCOUNTER — APPOINTMENT (EMERGENCY)
Dept: RADIOLOGY | Facility: HOSPITAL | Age: 83
End: 2023-12-06
Payer: COMMERCIAL

## 2023-12-06 ENCOUNTER — HOSPITAL ENCOUNTER (INPATIENT)
Facility: HOSPITAL | Age: 83
LOS: 8 days | End: 2023-12-14
Attending: STUDENT IN AN ORGANIZED HEALTH CARE EDUCATION/TRAINING PROGRAM | Admitting: STUDENT IN AN ORGANIZED HEALTH CARE EDUCATION/TRAINING PROGRAM
Payer: COMMERCIAL

## 2023-12-06 DIAGNOSIS — I10 HYPERTENSION, UNSPECIFIED TYPE: Chronic | ICD-10-CM

## 2023-12-06 DIAGNOSIS — I61.0 THALAMIC HEMORRHAGE (HCC): Primary | ICD-10-CM

## 2023-12-06 DIAGNOSIS — I61.9 INTRAPARENCHYMAL HEMORRHAGE OF BRAIN (HCC): ICD-10-CM

## 2023-12-06 DIAGNOSIS — R47.89 GARBLED SPEECH: ICD-10-CM

## 2023-12-06 PROBLEM — N18.31 STAGE 3A CHRONIC KIDNEY DISEASE (HCC): Status: ACTIVE | Noted: 2023-12-06

## 2023-12-06 PROBLEM — N40.0 BENIGN PROSTATIC HYPERPLASIA WITHOUT URINARY OBSTRUCTION: Status: ACTIVE | Noted: 2020-01-22

## 2023-12-06 PROBLEM — E11.9 DIABETES MELLITUS (HCC): Status: ACTIVE | Noted: 2023-12-06

## 2023-12-06 PROBLEM — E66.01 CLASS 3 SEVERE OBESITY WITH BODY MASS INDEX (BMI) OF 40.0 TO 44.9 IN ADULT (HCC): Status: ACTIVE | Noted: 2023-12-06

## 2023-12-06 LAB
ALBUMIN SERPL BCP-MCNC: 4.2 G/DL (ref 3.5–5)
ALP SERPL-CCNC: 55 U/L (ref 34–104)
ALT SERPL W P-5'-P-CCNC: 16 U/L (ref 7–52)
ANION GAP SERPL CALCULATED.3IONS-SCNC: 6 MMOL/L
APTT PPP: 32 SECONDS (ref 23–37)
AST SERPL W P-5'-P-CCNC: 18 U/L (ref 13–39)
BASOPHILS # BLD AUTO: 0.05 THOUSANDS/ÂΜL (ref 0–0.1)
BASOPHILS NFR BLD AUTO: 1 % (ref 0–1)
BILIRUB SERPL-MCNC: 0.61 MG/DL (ref 0.2–1)
BNP SERPL-MCNC: 33 PG/ML (ref 0–100)
BUN SERPL-MCNC: 23 MG/DL (ref 5–25)
CALCIUM SERPL-MCNC: 9.2 MG/DL (ref 8.4–10.2)
CARDIAC TROPONIN I PNL SERPL HS: 6 NG/L
CHLORIDE SERPL-SCNC: 104 MMOL/L (ref 96–108)
CO2 SERPL-SCNC: 29 MMOL/L (ref 21–32)
CREAT SERPL-MCNC: 1.4 MG/DL (ref 0.6–1.3)
EOSINOPHIL # BLD AUTO: 0.19 THOUSAND/ÂΜL (ref 0–0.61)
EOSINOPHIL NFR BLD AUTO: 2 % (ref 0–6)
ERYTHROCYTE [DISTWIDTH] IN BLOOD BY AUTOMATED COUNT: 15.2 % (ref 11.6–15.1)
FLUAV RNA RESP QL NAA+PROBE: NEGATIVE
FLUBV RNA RESP QL NAA+PROBE: NEGATIVE
GFR SERPL CREATININE-BSD FRML MDRD: 45 ML/MIN/1.73SQ M
GLUCOSE SERPL-MCNC: 118 MG/DL (ref 65–140)
GLUCOSE SERPL-MCNC: 120 MG/DL (ref 65–140)
GLUCOSE SERPL-MCNC: 128 MG/DL (ref 65–140)
GLUCOSE SERPL-MCNC: 133 MG/DL (ref 65–140)
HCT VFR BLD AUTO: 45.8 % (ref 36.5–49.3)
HGB BLD-MCNC: 14.1 G/DL (ref 12–17)
IMM GRANULOCYTES # BLD AUTO: 0.02 THOUSAND/UL (ref 0–0.2)
IMM GRANULOCYTES NFR BLD AUTO: 0 % (ref 0–2)
INR PPP: 0.98 (ref 0.84–1.19)
LACTATE SERPL-SCNC: 1.1 MMOL/L (ref 0.5–2)
LYMPHOCYTES # BLD AUTO: 2.35 THOUSANDS/ÂΜL (ref 0.6–4.47)
LYMPHOCYTES NFR BLD AUTO: 29 % (ref 14–44)
MAGNESIUM SERPL-MCNC: 1.9 MG/DL (ref 1.9–2.7)
MCH RBC QN AUTO: 26.8 PG (ref 26.8–34.3)
MCHC RBC AUTO-ENTMCNC: 30.8 G/DL (ref 31.4–37.4)
MCV RBC AUTO: 87 FL (ref 82–98)
MONOCYTES # BLD AUTO: 0.61 THOUSAND/ÂΜL (ref 0.17–1.22)
MONOCYTES NFR BLD AUTO: 8 % (ref 4–12)
NEUTROPHILS # BLD AUTO: 4.92 THOUSANDS/ÂΜL (ref 1.85–7.62)
NEUTS SEG NFR BLD AUTO: 60 % (ref 43–75)
NRBC BLD AUTO-RTO: 0 /100 WBCS
PLATELET # BLD AUTO: 228 THOUSANDS/UL (ref 149–390)
PMV BLD AUTO: 9.9 FL (ref 8.9–12.7)
POTASSIUM SERPL-SCNC: 4.1 MMOL/L (ref 3.5–5.3)
PROCALCITONIN SERPL-MCNC: <0.05 NG/ML
PROT SERPL-MCNC: 7.9 G/DL (ref 6.4–8.4)
PROTHROMBIN TIME: 13.3 SECONDS (ref 11.6–14.5)
RBC # BLD AUTO: 5.26 MILLION/UL (ref 3.88–5.62)
RSV RNA RESP QL NAA+PROBE: NEGATIVE
SARS-COV-2 RNA RESP QL NAA+PROBE: NEGATIVE
SODIUM SERPL-SCNC: 139 MMOL/L (ref 135–147)
WBC # BLD AUTO: 8.14 THOUSAND/UL (ref 4.31–10.16)

## 2023-12-06 PROCEDURE — 99291 CRITICAL CARE FIRST HOUR: CPT | Performed by: EMERGENCY MEDICINE

## 2023-12-06 PROCEDURE — 82948 REAGENT STRIP/BLOOD GLUCOSE: CPT

## 2023-12-06 PROCEDURE — 87154 CUL TYP ID BLD PTHGN 6+ TRGT: CPT | Performed by: PHYSICIAN ASSISTANT

## 2023-12-06 PROCEDURE — 84145 PROCALCITONIN (PCT): CPT | Performed by: PHYSICIAN ASSISTANT

## 2023-12-06 PROCEDURE — 83880 ASSAY OF NATRIURETIC PEPTIDE: CPT | Performed by: PHYSICIAN ASSISTANT

## 2023-12-06 PROCEDURE — 84484 ASSAY OF TROPONIN QUANT: CPT | Performed by: PHYSICIAN ASSISTANT

## 2023-12-06 PROCEDURE — 99291 CRITICAL CARE FIRST HOUR: CPT | Performed by: PHYSICIAN ASSISTANT

## 2023-12-06 PROCEDURE — 87040 BLOOD CULTURE FOR BACTERIA: CPT | Performed by: PHYSICIAN ASSISTANT

## 2023-12-06 PROCEDURE — 70450 CT HEAD/BRAIN W/O DYE: CPT

## 2023-12-06 PROCEDURE — 99291 CRITICAL CARE FIRST HOUR: CPT

## 2023-12-06 PROCEDURE — 70496 CT ANGIOGRAPHY HEAD: CPT

## 2023-12-06 PROCEDURE — 36415 COLL VENOUS BLD VENIPUNCTURE: CPT | Performed by: PHYSICIAN ASSISTANT

## 2023-12-06 PROCEDURE — G1004 CDSM NDSC: HCPCS

## 2023-12-06 PROCEDURE — 0241U HB NFCT DS VIR RESP RNA 4 TRGT: CPT | Performed by: PHYSICIAN ASSISTANT

## 2023-12-06 PROCEDURE — 83735 ASSAY OF MAGNESIUM: CPT | Performed by: PHYSICIAN ASSISTANT

## 2023-12-06 PROCEDURE — 85610 PROTHROMBIN TIME: CPT | Performed by: PHYSICIAN ASSISTANT

## 2023-12-06 PROCEDURE — 83605 ASSAY OF LACTIC ACID: CPT | Performed by: PHYSICIAN ASSISTANT

## 2023-12-06 PROCEDURE — 80053 COMPREHEN METABOLIC PANEL: CPT | Performed by: PHYSICIAN ASSISTANT

## 2023-12-06 PROCEDURE — 94660 CPAP INITIATION&MGMT: CPT

## 2023-12-06 PROCEDURE — 99292 CRITICAL CARE ADDL 30 MIN: CPT | Performed by: EMERGENCY MEDICINE

## 2023-12-06 PROCEDURE — 87181 SC STD AGAR DILUTION PER AGT: CPT | Performed by: PHYSICIAN ASSISTANT

## 2023-12-06 PROCEDURE — 94760 N-INVAS EAR/PLS OXIMETRY 1: CPT

## 2023-12-06 PROCEDURE — 85025 COMPLETE CBC W/AUTO DIFF WBC: CPT | Performed by: PHYSICIAN ASSISTANT

## 2023-12-06 PROCEDURE — 71260 CT THORAX DX C+: CPT

## 2023-12-06 PROCEDURE — 70498 CT ANGIOGRAPHY NECK: CPT

## 2023-12-06 PROCEDURE — 81001 URINALYSIS AUTO W/SCOPE: CPT | Performed by: PHYSICIAN ASSISTANT

## 2023-12-06 PROCEDURE — 85730 THROMBOPLASTIN TIME PARTIAL: CPT | Performed by: PHYSICIAN ASSISTANT

## 2023-12-06 PROCEDURE — 87077 CULTURE AEROBIC IDENTIFY: CPT | Performed by: PHYSICIAN ASSISTANT

## 2023-12-06 PROCEDURE — 93005 ELECTROCARDIOGRAM TRACING: CPT

## 2023-12-06 PROCEDURE — 96374 THER/PROPH/DIAG INJ IV PUSH: CPT

## 2023-12-06 PROCEDURE — 74177 CT ABD & PELVIS W/CONTRAST: CPT

## 2023-12-06 PROCEDURE — NC001 PR NO CHARGE: Performed by: STUDENT IN AN ORGANIZED HEALTH CARE EDUCATION/TRAINING PROGRAM

## 2023-12-06 RX ORDER — PANTOPRAZOLE SODIUM 40 MG/1
40 TABLET, DELAYED RELEASE ORAL DAILY
Status: DISCONTINUED | OUTPATIENT
Start: 2023-12-07 | End: 2023-12-07

## 2023-12-06 RX ORDER — METOPROLOL TARTRATE 1 MG/ML
5 INJECTION, SOLUTION INTRAVENOUS EVERY 8 HOURS
Status: DISCONTINUED | OUTPATIENT
Start: 2023-12-06 | End: 2023-12-07

## 2023-12-06 RX ORDER — ONDANSETRON 2 MG/ML
4 INJECTION INTRAMUSCULAR; INTRAVENOUS EVERY 6 HOURS PRN
Status: DISCONTINUED | OUTPATIENT
Start: 2023-12-06 | End: 2023-12-14 | Stop reason: HOSPADM

## 2023-12-06 RX ORDER — LEVOTHYROXINE SODIUM 0.1 MG/1
200 TABLET ORAL
Status: DISCONTINUED | OUTPATIENT
Start: 2023-12-07 | End: 2023-12-14 | Stop reason: HOSPADM

## 2023-12-06 RX ORDER — INSULIN LISPRO 100 [IU]/ML
1-6 INJECTION, SOLUTION INTRAVENOUS; SUBCUTANEOUS EVERY 6 HOURS SCHEDULED
Status: DISCONTINUED | OUTPATIENT
Start: 2023-12-07 | End: 2023-12-08

## 2023-12-06 RX ORDER — METOPROLOL SUCCINATE 50 MG/1
50 TABLET, EXTENDED RELEASE ORAL DAILY
Status: DISCONTINUED | OUTPATIENT
Start: 2023-12-07 | End: 2023-12-08

## 2023-12-06 RX ORDER — HYDRALAZINE HYDROCHLORIDE 20 MG/ML
10 INJECTION INTRAMUSCULAR; INTRAVENOUS ONCE
Status: COMPLETED | OUTPATIENT
Start: 2023-12-06 | End: 2023-12-06

## 2023-12-06 RX ORDER — LABETALOL HYDROCHLORIDE 5 MG/ML
10 INJECTION, SOLUTION INTRAVENOUS ONCE
Status: COMPLETED | OUTPATIENT
Start: 2023-12-06 | End: 2023-12-06

## 2023-12-06 RX ORDER — ALBUTEROL SULFATE 90 UG/1
2 AEROSOL, METERED RESPIRATORY (INHALATION) EVERY 6 HOURS PRN
Status: DISCONTINUED | OUTPATIENT
Start: 2023-12-06 | End: 2023-12-14 | Stop reason: HOSPADM

## 2023-12-06 RX ADMIN — DESMOPRESSIN ACETATE 39.2 MCG: 4 SOLUTION INTRAVENOUS at 19:52

## 2023-12-06 RX ADMIN — HYDRALAZINE HYDROCHLORIDE 10 MG: 20 INJECTION INTRAMUSCULAR; INTRAVENOUS at 18:36

## 2023-12-06 RX ADMIN — IOHEXOL 100 ML: 350 INJECTION, SOLUTION INTRAVENOUS at 18:18

## 2023-12-06 RX ADMIN — Medication 10 MG: at 20:01

## 2023-12-06 RX ADMIN — SODIUM CHLORIDE 2.5 MG/HR: 0.9 INJECTION, SOLUTION INTRAVENOUS at 22:29

## 2023-12-06 RX ADMIN — METOROPROLOL TARTRATE 5 MG: 5 INJECTION, SOLUTION INTRAVENOUS at 21:44

## 2023-12-06 RX ADMIN — HYDRALAZINE HYDROCHLORIDE 10 MG: 20 INJECTION INTRAMUSCULAR; INTRAVENOUS at 18:57

## 2023-12-06 NOTE — CONSULTS
NEUROLOGY STROKE ALERT TELEPHONE NOTE    I did not personally see or examine the patient at time of ED presentation at 18:08 on 12/6/2023, but I discussed the case in real-time with the onsite ED provider including patient's past medical history, presenting symptoms, vital signs, physical exam findings, lab values, neuroimaging, further workup and treatment recommendations. I also personally reviewed patient's vital signs, lab values, and neuroimaging studies as well as discussing the neuroimaging studies with the reading radiologist. Please see my following comments for details and adjustments. Critical care time, excluding procedures, teaching, family meetings, and excludes any prior time recorded by providers other than myself, 30 minutes. 68-year-old male with past medical history pertinent for HTN, DM, GERD presenting with generalized weakness, fatigue, and garbled speech. Patient is reportedly just getting over a bout of bronchitis and finished his antibiotics 2 days ago. LKN 23:00 on 12/5/2023. This morning he essentially slept all day and family found him to be very sleepy with slurred speech. He also reported a fall due to dizziness without loss of consciousness. Stroke alert activated at 18:08 and neurology response via telephone was immediate. Initial /89. Initial . On aspirin 81 mg daily at home with last dose yesterday. ED exam reportedly notable for mild left facial droop, mild dysarthria, and mild symmetric weakness without drift. Initial NIHSS reportedly 2. CT head revealed small acute left basal ganglia and lateral midbrain IPH without significant associated mass effect or IVH. CTA head/neck showed no LVO, no spot sign, and no underlying vascular malformation. No TNK due to IPH on CT head. No thrombectomy due to no LVO. ICH score 2. IPH etiology likely hypertensive given its location and presenting BP. Admit to stepdown with frequent neurochecks.   No need for DDAVP given last aspirin dose yesterday. Maintain BP less than 140/90. Hold all antiplatelets and anticoagulants. Hold pharmacologic DVT prophylaxis for the first 48 hours. No need for MRI while inpatient. Can consider outpatient MRI with and without contrast in 8 to 12 weeks to evaluate for underlying mass or vascular malformation, though suspicion is low. Should also obtain toxic metabolic workup for generalized weakness and fatigue in setting of recent infection. Gemini Meneses MD  Neurology  12/06/23    This note was completed in part utilizing [employer]. Grammatical errors, random word insertions, spelling mistakes, and incomplete sentences may be an occasional consequence of this system secondary to software limitations, ambient noise, and hardware issues. If you have any questions or concerns about the content, text, or information contained within the body of this dictation, please contact the provider for clarification.

## 2023-12-06 NOTE — STROKE DOCUMENTATION
Family arrived at bedside and states the patient" did not seem right to family today related to his mental status and speech.  They were attempting to leave the house when they turned around to find there dad had walked outside by himself and fell." Last known well per family at bedside last known well 12/5/23 @ 2300

## 2023-12-07 ENCOUNTER — APPOINTMENT (INPATIENT)
Dept: MRI IMAGING | Facility: HOSPITAL | Age: 83
End: 2023-12-07
Payer: COMMERCIAL

## 2023-12-07 ENCOUNTER — APPOINTMENT (INPATIENT)
Dept: CT IMAGING | Facility: HOSPITAL | Age: 83
End: 2023-12-07
Payer: COMMERCIAL

## 2023-12-07 PROBLEM — R06.82 TACHYPNEA: Status: ACTIVE | Noted: 2023-12-07

## 2023-12-07 LAB
ANION GAP SERPL CALCULATED.3IONS-SCNC: 6 MMOL/L
BACTERIA UR QL AUTO: NORMAL /HPF
BASOPHILS # BLD AUTO: 0.06 THOUSANDS/ÂΜL (ref 0–0.1)
BASOPHILS NFR BLD AUTO: 1 % (ref 0–1)
BILIRUB UR QL STRIP: NEGATIVE
BUN SERPL-MCNC: 24 MG/DL (ref 5–25)
CALCIUM SERPL-MCNC: 8.8 MG/DL (ref 8.4–10.2)
CHLORIDE SERPL-SCNC: 107 MMOL/L (ref 96–108)
CHOLEST SERPL-MCNC: 100 MG/DL
CLARITY UR: CLEAR
CO2 SERPL-SCNC: 28 MMOL/L (ref 21–32)
COLOR UR: YELLOW
CREAT SERPL-MCNC: 1.25 MG/DL (ref 0.6–1.3)
EOSINOPHIL # BLD AUTO: 0.09 THOUSAND/ÂΜL (ref 0–0.61)
EOSINOPHIL NFR BLD AUTO: 1 % (ref 0–6)
ERYTHROCYTE [DISTWIDTH] IN BLOOD BY AUTOMATED COUNT: 15.1 % (ref 11.6–15.1)
EST. AVERAGE GLUCOSE BLD GHB EST-MCNC: 134 MG/DL
GFR SERPL CREATININE-BSD FRML MDRD: 52 ML/MIN/1.73SQ M
GLUCOSE SERPL-MCNC: 107 MG/DL (ref 65–140)
GLUCOSE SERPL-MCNC: 111 MG/DL (ref 65–140)
GLUCOSE SERPL-MCNC: 114 MG/DL (ref 65–140)
GLUCOSE SERPL-MCNC: 122 MG/DL (ref 65–140)
GLUCOSE UR STRIP-MCNC: NEGATIVE MG/DL
HBA1C MFR BLD: 6.3 %
HCT VFR BLD AUTO: 41 % (ref 36.5–49.3)
HDLC SERPL-MCNC: 25 MG/DL
HGB BLD-MCNC: 12.7 G/DL (ref 12–17)
HGB UR QL STRIP.AUTO: ABNORMAL
IMM GRANULOCYTES # BLD AUTO: 0.06 THOUSAND/UL (ref 0–0.2)
IMM GRANULOCYTES NFR BLD AUTO: 1 % (ref 0–2)
KETONES UR STRIP-MCNC: NEGATIVE MG/DL
LDLC SERPL CALC-MCNC: 52 MG/DL (ref 0–100)
LEUKOCYTE ESTERASE UR QL STRIP: NEGATIVE
LYMPHOCYTES # BLD AUTO: 2.05 THOUSANDS/ÂΜL (ref 0.6–4.47)
LYMPHOCYTES NFR BLD AUTO: 17 % (ref 14–44)
MAGNESIUM SERPL-MCNC: 1.8 MG/DL (ref 1.9–2.7)
MCH RBC QN AUTO: 26.5 PG (ref 26.8–34.3)
MCHC RBC AUTO-ENTMCNC: 31 G/DL (ref 31.4–37.4)
MCV RBC AUTO: 86 FL (ref 82–98)
MONOCYTES # BLD AUTO: 1.04 THOUSAND/ÂΜL (ref 0.17–1.22)
MONOCYTES NFR BLD AUTO: 8 % (ref 4–12)
NEUTROPHILS # BLD AUTO: 9.16 THOUSANDS/ÂΜL (ref 1.85–7.62)
NEUTS SEG NFR BLD AUTO: 72 % (ref 43–75)
NITRITE UR QL STRIP: NEGATIVE
NON-SQ EPI CELLS URNS QL MICRO: NORMAL /HPF
NRBC BLD AUTO-RTO: 0 /100 WBCS
PH UR STRIP.AUTO: 6 [PH]
PLATELET # BLD AUTO: 207 THOUSANDS/UL (ref 149–390)
PMV BLD AUTO: 9.7 FL (ref 8.9–12.7)
POTASSIUM SERPL-SCNC: 4.1 MMOL/L (ref 3.5–5.3)
PROT UR STRIP-MCNC: NEGATIVE MG/DL
RBC # BLD AUTO: 4.79 MILLION/UL (ref 3.88–5.62)
RBC #/AREA URNS AUTO: NORMAL /HPF
SODIUM SERPL-SCNC: 141 MMOL/L (ref 135–147)
SP GR UR STRIP.AUTO: 1.01 (ref 1–1.03)
TRIGL SERPL-MCNC: 113 MG/DL
UROBILINOGEN UR QL STRIP.AUTO: 0.2 E.U./DL
WBC # BLD AUTO: 12.46 THOUSAND/UL (ref 4.31–10.16)
WBC #/AREA URNS AUTO: NORMAL /HPF

## 2023-12-07 PROCEDURE — 94760 N-INVAS EAR/PLS OXIMETRY 1: CPT

## 2023-12-07 PROCEDURE — G1004 CDSM NDSC: HCPCS

## 2023-12-07 PROCEDURE — 80048 BASIC METABOLIC PNL TOTAL CA: CPT | Performed by: PHYSICIAN ASSISTANT

## 2023-12-07 PROCEDURE — 70450 CT HEAD/BRAIN W/O DYE: CPT

## 2023-12-07 PROCEDURE — 99447 NTRPROF PH1/NTRNET/EHR 11-20: CPT | Performed by: PSYCHIATRY & NEUROLOGY

## 2023-12-07 PROCEDURE — 97535 SELF CARE MNGMENT TRAINING: CPT

## 2023-12-07 PROCEDURE — 82948 REAGENT STRIP/BLOOD GLUCOSE: CPT

## 2023-12-07 PROCEDURE — C9113 INJ PANTOPRAZOLE SODIUM, VIA: HCPCS | Performed by: NURSE PRACTITIONER

## 2023-12-07 PROCEDURE — A9585 GADOBUTROL INJECTION: HCPCS | Performed by: HOSPITALIST

## 2023-12-07 PROCEDURE — NC001 PR NO CHARGE: Performed by: STUDENT IN AN ORGANIZED HEALTH CARE EDUCATION/TRAINING PROGRAM

## 2023-12-07 PROCEDURE — 99233 SBSQ HOSP IP/OBS HIGH 50: CPT | Performed by: STUDENT IN AN ORGANIZED HEALTH CARE EDUCATION/TRAINING PROGRAM

## 2023-12-07 PROCEDURE — 92610 EVALUATE SWALLOWING FUNCTION: CPT

## 2023-12-07 PROCEDURE — 80061 LIPID PANEL: CPT | Performed by: PHYSICIAN ASSISTANT

## 2023-12-07 PROCEDURE — 97163 PT EVAL HIGH COMPLEX 45 MIN: CPT

## 2023-12-07 PROCEDURE — 93005 ELECTROCARDIOGRAM TRACING: CPT

## 2023-12-07 PROCEDURE — 70553 MRI BRAIN STEM W/O & W/DYE: CPT

## 2023-12-07 PROCEDURE — 97167 OT EVAL HIGH COMPLEX 60 MIN: CPT

## 2023-12-07 PROCEDURE — 85025 COMPLETE CBC W/AUTO DIFF WBC: CPT | Performed by: PHYSICIAN ASSISTANT

## 2023-12-07 PROCEDURE — 94668 MNPJ CHEST WALL SBSQ: CPT

## 2023-12-07 PROCEDURE — 94660 CPAP INITIATION&MGMT: CPT

## 2023-12-07 PROCEDURE — 83735 ASSAY OF MAGNESIUM: CPT | Performed by: PHYSICIAN ASSISTANT

## 2023-12-07 PROCEDURE — 83036 HEMOGLOBIN GLYCOSYLATED A1C: CPT | Performed by: PHYSICIAN ASSISTANT

## 2023-12-07 PROCEDURE — 97116 GAIT TRAINING THERAPY: CPT

## 2023-12-07 RX ORDER — HYDRALAZINE HYDROCHLORIDE 20 MG/ML
10 INJECTION INTRAMUSCULAR; INTRAVENOUS EVERY 6 HOURS PRN
Status: DISCONTINUED | OUTPATIENT
Start: 2023-12-07 | End: 2023-12-08

## 2023-12-07 RX ORDER — HYDRALAZINE HYDROCHLORIDE 20 MG/ML
10 INJECTION INTRAMUSCULAR; INTRAVENOUS ONCE
Status: COMPLETED | OUTPATIENT
Start: 2023-12-07 | End: 2023-12-07

## 2023-12-07 RX ORDER — ATORVASTATIN CALCIUM 10 MG/1
10 TABLET, FILM COATED ORAL DAILY
COMMUNITY

## 2023-12-07 RX ORDER — METOPROLOL TARTRATE 1 MG/ML
5 INJECTION, SOLUTION INTRAVENOUS ONCE
Status: COMPLETED | OUTPATIENT
Start: 2023-12-07 | End: 2023-12-07

## 2023-12-07 RX ORDER — ERGOCALCIFEROL 1.25 MG/1
1 CAPSULE ORAL WEEKLY
COMMUNITY

## 2023-12-07 RX ORDER — AMLODIPINE BESYLATE 5 MG/1
5 TABLET ORAL DAILY
Status: ON HOLD | COMMUNITY
End: 2023-12-14

## 2023-12-07 RX ORDER — HYDRALAZINE HYDROCHLORIDE 20 MG/ML
20 INJECTION INTRAMUSCULAR; INTRAVENOUS EVERY 6 HOURS SCHEDULED
Status: DISCONTINUED | OUTPATIENT
Start: 2023-12-08 | End: 2023-12-08

## 2023-12-07 RX ORDER — MAGNESIUM SULFATE HEPTAHYDRATE 40 MG/ML
2 INJECTION, SOLUTION INTRAVENOUS ONCE
Status: COMPLETED | OUTPATIENT
Start: 2023-12-07 | End: 2023-12-07

## 2023-12-07 RX ORDER — PANTOPRAZOLE SODIUM 40 MG/10ML
40 INJECTION, POWDER, LYOPHILIZED, FOR SOLUTION INTRAVENOUS EVERY MORNING
Status: DISCONTINUED | OUTPATIENT
Start: 2023-12-07 | End: 2023-12-14 | Stop reason: HOSPADM

## 2023-12-07 RX ORDER — ACETAMINOPHEN 160 MG
1000 TABLET,DISINTEGRATING ORAL DAILY
COMMUNITY

## 2023-12-07 RX ORDER — GADOBUTROL 604.72 MG/ML
12 INJECTION INTRAVENOUS
Status: COMPLETED | OUTPATIENT
Start: 2023-12-07 | End: 2023-12-07

## 2023-12-07 RX ORDER — ROPINIROLE 0.25 MG/1
0.25 TABLET, FILM COATED ORAL
COMMUNITY

## 2023-12-07 RX ORDER — AZELASTINE 1 MG/ML
1 SPRAY, METERED NASAL 2 TIMES DAILY PRN
COMMUNITY

## 2023-12-07 RX ORDER — LEVOTHYROXINE SODIUM 0.2 MG/1
200 TABLET ORAL DAILY
COMMUNITY

## 2023-12-07 RX ORDER — METOPROLOL TARTRATE 1 MG/ML
5 INJECTION, SOLUTION INTRAVENOUS EVERY 6 HOURS
Status: DISCONTINUED | OUTPATIENT
Start: 2023-12-07 | End: 2023-12-08

## 2023-12-07 RX ADMIN — PANTOPRAZOLE SODIUM 40 MG: 40 INJECTION, POWDER, FOR SOLUTION INTRAVENOUS at 10:12

## 2023-12-07 RX ADMIN — METOROPROLOL TARTRATE 5 MG: 5 INJECTION, SOLUTION INTRAVENOUS at 20:45

## 2023-12-07 RX ADMIN — MAGNESIUM SULFATE HEPTAHYDRATE 2 G: 40 INJECTION, SOLUTION INTRAVENOUS at 08:18

## 2023-12-07 RX ADMIN — METOPROLOL TARTRATE 5 MG: 5 INJECTION INTRAVENOUS at 01:45

## 2023-12-07 RX ADMIN — GADOBUTROL 12 ML: 604.72 INJECTION INTRAVENOUS at 16:03

## 2023-12-07 RX ADMIN — HYDRALAZINE HYDROCHLORIDE 10 MG: 20 INJECTION INTRAMUSCULAR; INTRAVENOUS at 19:09

## 2023-12-07 RX ADMIN — HYDRALAZINE HYDROCHLORIDE 10 MG: 20 INJECTION INTRAMUSCULAR; INTRAVENOUS at 19:42

## 2023-12-07 RX ADMIN — HYDRALAZINE HYDROCHLORIDE 20 MG: 20 INJECTION INTRAMUSCULAR; INTRAVENOUS at 23:50

## 2023-12-07 NOTE — RESPIRATORY THERAPY NOTE
RT Protocol Note  Eduin Yoon 78 y.o. male MRN: [id number]  Unit/Bed#: -01 Encounter: [id number]    Assessment    Principal Problem:    Intraparenchymal hemorrhage of brain (HCC)  Active Problems:    Diabetes mellitus ([address])    Hypertension    Class 3 severe obesity with body mass index (BMI) of 40.0 to 44.9 in adult [facility])    Benign prostatic hyperplasia without urinary obstruction    Stage 3a chronic kidney disease (HCC)    Tachypnea      Home Pulmonary Medications:    Home Devices/Therapy: BiPAP/CPAP    Past Medical History:   Diagnosis Date    Diabetes mellitus ([address])     GERD (gastroesophageal reflux disease)     Hypertension      Social History     Socioeconomic History    Marital status: /Civil Union     Spouse name: None    Number of children: None    Years of education: None    Highest education level: None   Occupational History    None   Tobacco Use    Smoking status: Former     Packs/day: 5.00     Years: 15.00     Total pack years: 75.00     Types: Cigarettes    Smokeless tobacco: Never   Substance and Sexual Activity    Alcohol use: Not Currently    Drug use: Never    Sexual activity: None   Other Topics Concern    None   Social History Narrative    None     Social Determinants of Health     Financial Resource Strain: Not on file   Food Insecurity: Not on file   Transportation Needs: Not on file   Physical Activity: Not on file   Stress: Not on file   Social Connections: Not on file   Intimate Partner Violence: Not on file   Housing Stability: Not on file       Subjective         Objective    Physical Exam:   Assessment Type: Assess only  General Appearance: Awake, Lethargic  Respiratory Pattern: Normal  Chest Assessment: Chest expansion symmetrical  Bilateral Breath Sounds: Clear, Diminished  O2 Device: (P) 2L    Vitals:  Blood pressure 134/58, pulse 78, temperature 97.6 °F (36.4 °C), temperature source Temporal, resp.  rate 22, height 5' 10" (1.778 m), weight 128 kg (281 lb 4.9 oz), SpO2 95 %.          Imaging and other studies:     O2 Device: (P) 2L     Plan    Respiratory Plan: No distress/Pulmonary history        Resp Comments: (P) Pt denies any SOB, wore cpap last night stable on 2L, BS are diminished and coarse .  Flutter value ordered

## 2023-12-07 NOTE — ASSESSMENT & PLAN NOTE
Lab Results   Component Value Date    HGBA1C 6.5 (H) 05/11/2023       Recent Labs     12/06/23  1831 12/06/23 2051 12/06/23  2350 12/07/23  0542   POCGLU 133 118 120 111         Blood Sugar Average: Last 72 hrs:  (P) 120.5    Insulin sliding scale

## 2023-12-07 NOTE — PHYSICAL THERAPY NOTE
PHYSICAL THERAPY EVALUATION  NAME:  Eduin Yoon  DATE: 12/07/23    AGE:   78 y.o. Mrn:   [id number]  ADMIT DX:  Weakness [R53.1]  Thalamic hemorrhage ([address]) [I61.0]  Garbled speech [R47.89]    Past Medical History:   Diagnosis Date    Diabetes mellitus ([address])     GERD (gastroesophageal reflux disease)     Hypertension      Length Of Stay: 1  Performed at least 2 patient identifiers during session: Name and Birthday  PHYSICAL THERAPY EVALUATION :    12/07/23 0950   PT Last Visit   PT Visit Date 12/07/23   Note Type   Note type Evaluation   Pain Assessment   Pain Assessment Tool 0-10   Pain Score No Pain   Restrictions/Precautions   Other Precautions Chair Alarm; Bed Alarm; Fall Risk;Multiple lines;Telemetry  (1 lpm NC on arrival)   Home Living   Type of Home House  (4 WARREN R HR)   Home Layout Multi-level; Laundry in basement;Bed/bath upstairs  (FF B HR)   Bathroom Shower/Tub Tub/shower unit   Bathroom Toilet Standard   Bathroom Equipment Commode; Shower chair;Grab bars in shower   Home Equipment Walker;Cane  (wasn't using PTA)   Additional Comments Reports living in a 2  with WARREN and bedroom/bathroom upstairs. Reports not using an AD PTA. Prior Function   Level of [place] Independent with ADLs; Independent with functional mobility; Independent with IADLS   Lives With Spouse   Receives Help From Family   IADLs Independent with medication management; Independent with driving;Family/Friend/Other provides meals   Falls in the last 6 months 1 to 4   Comments Reports being independent with mobility, ADLs and IADLs. General   Additional Pertinent History CT head 12/6/23: Acute hemorrhage centered in the left thalamus and cerebral peduncle measuring 1.3 x 1.3 x 1.5 cm. Minimal surrounding vasogenic edema. CT head 12/7/23: Stable left thalamic hematoma. 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    Cognition   Orientation Level Oriented X4   Following Commands Follows one step commands without difficulty Comments pt with slurred speech, difficult to understand at times. Subjective   Subjective "My wife got out of here 2 weeks ago."   RLE Assessment   RLE Assessment WFL  (2+/5)   LLE Assessment   LLE Assessment WFL  (4/5)   Coordination   Movements are Fluid and Coordinated 0   Coordination and Movement Description impaired right LE with ambulation. impaired coordination right UE. Rapid Alternating Movements Impaired   Light Touch   RLE Light Touch Grossly intact   LLE Light Touch Grossly intact   Bed Mobility   Supine to Sit 3  Moderate assistance   Additional items Assist x 1; Increased time required;Verbal cues;LE management  (trunk management)   Additional Comments HOB flat without bedrial. inc time and effort to complete wiht manual cues for trunk management and LE management. Transfers   Sit to Stand   (steadying assistance)   Additional items Assist x 1; Increased time required;Verbal cues   Stand to Sit 4  Minimal assistance   Additional items Assist x 1; Increased time required;Verbal cues   Stand pivot 3  Moderate assistance   Additional items Assist x 1; Increased time required;Verbal cues   Additional Comments no AD. sit to stand with steadying asisstance. wide LUPE. pt reaching for support. spt without AD with modAx1 with wide LUPE, right foot drag, decreased foot clearance and step length. stand to sit with miNAx1 for controlled descent. pt sitting prematurely prior to turning completely. Ambulation/Elevation   Gait pattern Wide LUPE; Improper Weight shift;R Foot drag;Decreased foot clearance;Decreased R stance; Short stride; Excessively slow;Knees flexed   Gait Assistance 3  Moderate assist   Additional items Assist x 1;Verbal cues   Assistive Device None   Distance ambulated 3'x1 and 5'x1 without AD with wide LUPE, decreased right knee ext in stance, right foot drag, decreased step lenght and foot clearance. cues for inc step length and knee extension on right.    Balance   Static Sitting Good   Dynamic Sitting Fair +   Static Standing Fair -   Dynamic Standing Poor +   Ambulatory Poor   Endurance Deficit   Endurance Deficit Description SpO2 at rest on 1 lpm 94%. trialed on room air. Spo2 on room air 92-94% throughout. Activity Tolerance   Activity Tolerance Patient limited by fatigue   Medical Staff Made Aware miranda KAUR   Nurse Made Aware RN, Saray   Assessment   Prognosis Good   Problem List Decreased strength;Decreased endurance; Impaired balance;Decreased coordination;Decreased mobility; Impaired judgement;Decreased safety awareness; Obesity   Barriers to Discharge Decreased caregiver support; Inaccessible home environment   Barriers to Discharge Comments requires assistance ot complete all mobility, increased fall risk   Goals   Patient Goals "get better"   [facility] Expiration Date 12/21/23   PT Treatment Day 1   Plan   Treatment/Interventions ADL retraining;Functional transfer training;LE strengthening/ROM; Elevations; Therapeutic exercise; Endurance training;Patient/family training;Gait training;Bed mobility; Equipment eval/education; Compensatory technique education;Spoke to nursing;Spoke to case management;OT   PT Frequency 4-6x/wk   Discharge Recommendation   Rehab Resource Intensity Level, PT I (Maximum Resource Intensity)   AM-PAC Basic Mobility Inpatient   Turning in Flat Bed Without Bedrails 2   Lying on Back to Sitting on Edge of Flat Bed Without Bedrails 2   Moving Bed to Chair 2   Standing Up From Chair Using Arms 3   Walk in Room 2   Climb 3-5 Stairs With Railing 1   Basic Mobility Inpatient Raw Score 12   Basic Mobility Standardized Score 32.23   Highest Level Of Mobility   -HLM Goal 4: Move to chair/commode   JH-HLM Achieved 7: Walk 25 feet or more   Additional Treatment Session   Start Time 1025   End Time 1040   Treatment Assessment Pt tolerated session fairly.  He was able to stand with decreased assistance, but continues to require increased assistance with spt and ambulation for safe completion with increased verbal cues for technique and safety. He fatigues quickly. He is limited by decreased strength, balance, endurance, coordination. He will continue to benefit from PT services to maximize LOF. Equipment Use initiated use of RW. sit to stand with SBA. stand to sit with steadying assistance for contorlled descent. spt with RW with minimal to moderate assistance wiht manual cues for wt shifting and mod verbal cues to stay wihtin LUPE of RW and for turnign completely prior to sitting. pt with right foot drag, decreased step length and foot clearance. ambulated 16'x1 and 12'x1 with RW with moderate to minimal assistance at best with manual cues for balance and safe completion, pt with inc anterior lean. decreased step length and foot clearance with right foot drag with mod verbal cues for sequence and technique and to stay wihtin LUPE of RW. reviewed LE TE of ankle DF/PF, LAQ and hip flexion. pt demonstrating ankle DF/PF without difficulty. End of Consult   Patient Position at End of Consult Bedside chair;Bed/Chair alarm activated; All needs within reach       Pt requires PT/OT co-eval due to medical complexity, safety concerns, fall risk, significant assistance with mobility and/or cognitive-behavioral impairments. (Please find full objective findings from PT assessment regarding body systems outlined above). Assessment: Pt is a 78 y.o. male seen for PT evaluation s/p admission to [address] [place] [address] on 12/6/2023 with Intraparenchymal hemorrhage of brain ([address] Order placed for PT services. Upon evaluation: Pt is presenting with impaired functional mobility due to decreased strength, decreased endurance, impaired balance, impaired coordination, gait deviations, decreased safety awareness, impaired judgment, and fall risk requiring  moderate assistance for bed mobility, steadying to moderate assistance for transfers, and moderate assistance for ambulation with out AD .  Pt's clinical presentation is currently unpredictable given the functional mobility deficits above, especially weakness, decreased endurance, impaired balance, gait deviations, decreased activity tolerance, decreased functional mobility tolerance, decreased safety awareness, and impaired judgement, coupled with fall risks as indicated by AM-PAC 6-Clicks: 72/67 as well as hx of falls, impaired balance, polypharmacy, impaired judgement, decreased safety awareness, and obesity and combined with medical complications of abnormal WBCs, need for input for mobility technique/safety, and Acute hemorrhage centered in the left thalamus and cerebral peduncle now with Stable left thalamic hematoma . Pt's PMHx and comorbidities that may affect physical performance and progress include: CKD, DM, HTN, obesity, and BPH . Personal factors affecting pt at time of IE include: inaccessible home environment, step(s) to enter environment, multi-level environment, limited home support, advanced age, inability to perform IADLs, inability to perform ADLs, inability to navigate level surfaces without external assistance, inability to ambulate household distances, and recent fall(s)/fall history. Pt will benefit from continued skilled PT services to address deficits as defined above and to maximize level of functional mobility to facilitate return toward PLOF and improved QOL. From PT/mobility standpoint, recommendation at time of d/c would be Level I (Maximum Resource Intensity) in order to reduce fall risk and maximize pt's functional independence and consistency with mobility. Recommend trial with walker next 1-2 sessions and ther ex next 1-2 sessions. The patient's AM-PAC Basic Mobility Inpatient Short Form Raw Score is 12. A Raw score of less than or equal to 16 suggests the patient may benefit from discharge to post-acute rehabilitation services. Please also refer to the recommendation of the Physical Therapist for safe discharge planning. Goals: Pt will: Perform bed mobility tasks with supervision to reposition in bed and prepare for transfers. Pt will perform transfers with steadying assistance to decrease burden of care, decrease risk for falls, and improve activity tolerance and prepare for ambulation. Pt will ambulate with LRAD for >/= 76' with  steadying assistance  to decrease burden of care, decrease risk for falls, improve activity tolerance, and improve gait quality and to access home environment. Pt will complete 1 step with LRAD and >/= 3 steps with bilateral handrails with consistent min A of 1 to decrease burden of care, decrease risk for falls, improve activity tolerance, and improve gait quality. Pt will participate in objective balance assessment to determine baseline fall risk. Pt will participate in SSWS assessment to determine level of mobility. Pt will increase B LE strength >/= 1/2 MMT grade to facilitate functional mobility.       Jose Crawford, PT,DPT

## 2023-12-07 NOTE — UTILIZATION REVIEW
Initial Clinical Review    Admission: Date/Time/Statement:   Admission Orders (From admission, onward)       Ordered        12/06/23 1916  INPATIENT ADMISSION  Once                          Orders Placed This Encounter   Procedures    INPATIENT ADMISSION     Standing Status:   Standing     Number of Occurrences:   1     Order Specific Question:   Level of Care     Answer:   Level 1 Stepdown [13]     Order Specific Question:   Estimated length of stay     Answer:   More than 2 Midnights     Order Specific Question:   Certification     Answer:   I certify that inpatient services are medically necessary for this patient for a duration of greater than two midnights. See H&P and MD Progress Notes for additional information about the patient's course of treatment. ED Arrival Information       Expected   -    Arrival   12/6/2023 17:45    Acuity   Emergent              Means of arrival   Ambulance    Escorted by   [facility]    Admission type   Emergency              Arrival complaint   nausea             Chief Complaint   Patient presents with    Weakness - Generalized     Pt is just getting over bronchitis. States today he woke up feeling extremely weak and fatigued. His son tried to get him into the car but he patient was too weak  to ambulate. Denies any other symptoms. Initial Presentation: 78 y.o. male to ER.  last known well time at 11:00 on 12/5/2023. Per wife,  pt slept all day . This evening, very sleepy,  slurred speech, also felt dizzy - fell when trying to stand  (no syncop/ no injury)    PMH:  CKD 3a  (baseline crt 1.4-1.5)   enlarged prostate/ on flomax. Obesity/BASILIA on cpap. Htn, DM.   former smoker (no COPD Dx). In the ER stroke alert was called-pressure was 191/89 with aspirin yesterday NIH score of 2 and her CT revealed a small acute left basal ganglia and lateral midbrain IPH mild vasogenic edema. CTA H/N does not show any vascular malformation.     DDAVP given   Admit  IP status,  Level 1 stepdown  for  management of  Intraparenchymal Hemorrhage of Brain:    cont Neurochecks Q 30 minutes x 2 then every hour for 6 hours ,  Repeat CT of the head 6 hours after first one. Keep blood pressure less than 140   (IV Lopressor q8H)    Cardene drip to keep pressures less than 140. Consult neurology. Hold DVT prophylaxis for the first 48 hours. SSI For dm management. 12/06  NEUROLOGY:   etiology of bleed most likely HTN. Keep SBP < 140. Continue to hold AP/AC for 48 H.   MRI Brain . telemetry for at least 48 hours since initial admission to make sure no PAF vs other arrythmia. PT/OT/SLP to evaluate patient                         NIH SCORES  gCS   12/6 1830    1  (language)    15  12/6 2016    3    15  12/6 2100    4   (facial palsy, RLE, sensory, language )     12/07 0300   3  (facial palsy, sensory, language)    12/07  0600   3                                   15   oriented, rt facial drooping,  slurred speech decreased sensation rue/rle        12/07 1830   3   15       Date: 12/07       Day 2:  Andrea sethi dc'ed   this am @  0930. patient sitting up in chair comfortable with persistent dysarthria. Clinically stable. will TRANSFER to Med Surg level of care.     Awaiting MRI       ED Triage Vitals   Temperature Pulse Respirations Blood Pressure SpO2   12/06/23 1749 12/06/23 1749 12/06/23 1749 12/06/23 1749 12/06/23 1749   97.8 °F (36.6 °C) 78 20 (!) 191/89 95 %      Temp Source Heart Rate Source Patient Position - Orthostatic VS BP Location FiO2 (%)   12/06/23 1749 12/06/23 1749 12/06/23 1749 12/06/23 1749 --   Oral Monitor Sitting Left arm       Pain Score       12/06/23 2051       No Pain          Wt Readings from Last 1 Encounters:   12/07/23 128 kg (281 lb 4.9 oz)     Additional Vital Signs:   12/07/23 1633 97.4 °F (36.3 °C) Abnormal  70 20 167/76 109 94 % -- -- None (Room air) -- Lying   12/07/23 1600 97.4 °F (36.3 °C) Abnormal  70 20 167/76 109 -- -- -- None (Room air) -- Lying   12/07/23 0300 98.1 °F (36.7 °C) 99 28 Abnormal  131/70 92 95 % -- -- CPAP -- Lying   12/07/23 0245 -- 100 21 116/65 85 96 % -- -- -- -- --   12/07/23 0230 -- 100 23 Abnormal  110/62 82 95 % -- -- -- -- --   12/07/23 0215 -- 100 27 Abnormal  118/67 85 95 % -- -- -- -- --   12/07/23 0145 -- 113 Abnormal  26 Abnormal  131/70 94 97 % -- -- -- -- --   12/06/23 2315 -- 103 36 Abnormal  141/80 103 94 % -- -- -- -- --   12/06/23 2300 98.2 °F (36.8 °C) 99 25 Abnormal  152/75 106 95 % -- -- -- -- Lying   12/06/23 2245 -- 95 28 Abnormal  141/72 100 96 % -- -- -- -- --   12/06/23 2230 -- 93 31 Abnormal  147/74 105 97 % -- -- -- -- --   12/06/23 1836 -- -- -- 186/79 Abnormal  -- -- -- -- -- -- --   12/06/23 1830 -- 73 20 186/79 Abnormal  -- 95 % -- -- Nasal cannula -- --   12/06/23 1815 -- 73 20 176/80 Abnormal  -- 93 % -- -- Nasal cannula -- --   12/06/23 1805 -- 74 21 164/71 -- 93 % -- -- None (Room air) -- --     Pertinent Labs/Diagnostic Test Results:   12/06  EKG    SR w/AVB     MRI brain w wo contrast   Final Result by Charan Sheth MD (12/07 6784)         1. Stable left thalamic hemorrhage and surrounding vasogenic edema. Foci of chronic microhemorrhage in the left thalamus and left temporal subcortical white matter. 2. No evidence of solid lesion underlying the left thalamic hemorrhage. Workstation performed: [id number]         CT head wo contrast   Final Result by Shola Velez MD (12/07 0636)      Stable left thalamic hematoma. 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 Final report is in agreement with preliminary reading by Dr. Charles Calix from [address] [place] [address]  on 12/7/2023 at 3:59 a.m. Workstation performed: [id number]         CT head wo contrast   Final Result by Charan Sheth MD (12/06 2138)      Stable left thalamic hemorrhage and mild surrounding vasogenic edema.                   Workstation performed: [id number]         CTA stroke alert (head/neck)   Final Result by Vilma Jackson MD (12/06 1916)      No stenosis, dissection or occlusion of the carotid or vertebral arteries or major vessels of the [place]. No intracranial aneurysm or vascular malformation identified. I personally discussed this study with Jim GRIFFITHS  on 12/6/2023 7:11 PM.                           Workstation performed: [id number]         CT chest abdomen pelvis w contrast   Final Result by Edwardo Villegas DO (12/06 1918)      1. Mild emphysema. No consolidation or mass. 2. 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 Recommendation is for follow-up low radiation dose chest CT without contrast in one year. 3. Two nonobstructing calculi in the distal right ureter, the more cephalad calculus measuring at least 1 cm, the more inferior calculus measures about 6 mm. No hydroureter or hydronephrosis. Additional incidental findings as above. The study was marked in [facility] for follow-up. Workstation performed: [id number]         CT stroke alert brain   Final Result by Vilma Jackson MD (12/06 1916)         1. Acute hemorrhage centered in the left thalamus and cerebral peduncle measuring 1.3 x 1.3 x 1.5 cm. Minimal surrounding vasogenic edema.          I personally discussed this study with Jim GRIFFITHS on 12/6/2023 6:56 PM.            Workstation performed: [id number]           Results from last 7 days   Lab Units 12/06/23  1756   SARS-COV-2  Negative     Results from last 7 days   Lab Units 12/07/23  0438 12/06/23  1756   WBC Thousand/uL 12.46* 8.14   HEMOGLOBIN g/dL 12.7 14.1   HEMATOCRIT % 41.0 45.8   PLATELETS Thousands/uL 207 228   NEUTROS ABS Thousands/µL 9.16* 4.92         Results from last 7 days   Lab Units 12/07/23  0438 12/06/23  1756   SODIUM mmol/L 141 139   POTASSIUM mmol/L 4.1 4.1   CHLORIDE mmol/L 107 104   CO2 mmol/L 28 29 ANION GAP mmol/L 6 6   BUN mg/dL 24 23   CREATININE mg/dL 1.25 1.40*   EGFR ml/min/1.73sq m 52 45   CALCIUM mg/dL 8.8 9.2   MAGNESIUM mg/dL 1.8* 1.9     Results from last 7 days   Lab Units 12/06/23  1756   AST U/L 18   ALT U/L 16   ALK PHOS U/L 55   TOTAL PROTEIN g/dL 7.9   ALBUMIN g/dL 4.2   TOTAL BILIRUBIN mg/dL 0.61     Results from last 7 days   Lab Units 12/07/23  1147 12/07/23  0542 12/06/23  2350 12/06/23  2051 12/06/23  1831   POC GLUCOSE mg/dl 114 111 120 118 133     Results from last 7 days   Lab Units 12/07/23  0438 12/06/23  1756   GLUCOSE RANDOM mg/dL 122 128       Results from last 7 days   Lab Units 12/06/23  1756   HS TNI 0HR ng/L 6         Results from last 7 days   Lab Units 12/06/23  1756   PROTIME seconds 13.3   INR  0.98   PTT seconds 32         Results from last 7 days   Lab Units 12/06/23  1756   PROCALCITONIN ng/ml <0.05     Results from last 7 days   Lab Units 12/06/23  1756   LACTIC ACID mmol/L 1.1             Results from last 7 days   Lab Units 12/06/23  1756   BNP pg/mL 33       Results from last 7 days   Lab Units 12/06/23  2355   CLARITY UA  Clear   COLOR UA  Yellow   SPEC GRAV UA  1.015   PH UA  6.0   GLUCOSE UA mg/dl Negative   KETONES UA mg/dl Negative   BLOOD UA  Trace-lysed*   PROTEIN UA mg/dl Negative   NITRITE UA  Negative   BILIRUBIN UA  Negative   UROBILINOGEN UA E.U./dl 0.2   LEUKOCYTES UA  Negative   WBC UA /hpf None Seen   RBC UA /hpf 2-4   BACTERIA UA /hpf Occasional   EPITHELIAL CELLS WET PREP /hpf None Seen     Results from last 7 days   Lab Units 12/06/23  1756   INFLUENZA A PCR  Negative   INFLUENZA B PCR  Negative   RSV PCR  Negative       Results from last 7 days   Lab Units 12/06/23  1758 12/06/23  1756   BLOOD CULTURE  Received in Microbiology Lab. Culture in Progress. Received in Microbiology Lab. Culture in Progress.                    ED Treatment:   Medication Administration from 12/06/2023 1745 to 12/06/2023 2009         Date/Time Order Dose Route Action 12/06/2023 1836 EST hydrALAZINE (APRESOLINE) injection 10 mg 10 mg Intravenous Given     12/06/2023 1857 EST hydrALAZINE (APRESOLINE) injection 10 mg 10 mg Intravenous Given     12/06/2023 1952 EST desmopressin (DDAVP) 39.2 mcg in sodium chloride 0.9 % 50 mL IVPB 39.2 mcg Intravenous New Bag     12/06/2023 2001 EST labetalol (NORMODYNE) injection 10 mg 10 mg Intravenous Given          Past Medical History:   Diagnosis Date    Diabetes mellitus ([address])     GERD (gastroesophageal reflux disease)     Hypertension      Present on Admission:   Diabetes mellitus ([address])   Hypertension   Intraparenchymal hemorrhage of brain (HCC)   Benign prostatic hyperplasia without urinary obstruction   Stage 3a chronic kidney disease (HCC)   Tachypnea      Admitting Diagnosis: Weakness [R53.1]  Thalamic hemorrhage (HCC) [I61.0]  Garbled speech [R47.89]  Age/Sex: 78 y.o. male  Admission Orders:  Scheduled Medications:  insulin lispro, 1-6 Units, Subcutaneous, Q6H [facility]  levothyroxine, 200 mcg, Oral, Early Morning  metoprolol succinate, 50 mg, Oral, Daily  pantoprazole, 40 mg, Intravenous, QAM      Continuous IV Infusions:     PRN Meds:  albuterol, 2 puff, Inhalation, Q6H PRN  hydrALAZINE, 10 mg, Intravenous, Q6H PRN  ondansetron, 4 mg, Intravenous, Q6H PRN  pneumococcal 20-concha conj vacc, 0.5 mL, Intramuscular, Once PRN        IP CONSULT TO NEUROLOGY  IP CONSULT TO NEUROLOGY  IP CONSULT TO PHYSICAL MEDICINE REHAB  IP CONSULT TO CASE MANAGEMENT    Network Utilization Review Department  ATTENTION: Please call with any questions or concerns to [phone] and carefully listen to the prompts so that you are directed to the right person. All voicemails are confidential.   For Discharge needs, contact Care Management DC Support Team at [phone] opt.  2  Send all requests for admission clinical reviews, approved or denied determinations and any other requests to dedicated fax number below belonging to the campus where the patient is receiving treatment.  List of dedicated fax numbers for the Facilities:  [place] MIRELLA (Administrative/Medical Necessity) [phone]   DISCHARGE SUPPORT TEAM (NETWORK) [address] (Maternity/NICU/Pediatrics) [phone]   [address] [place] [address] 1000 [facility] [phone] [address]5 [address] 5220 [facility] [address] [address] [facility] [address] [phone]

## 2023-12-07 NOTE — PLAN OF CARE
Problem: NEUROSENSORY - ADULT  Goal: Achieves stable or improved neurological status  Description: INTERVENTIONS  - Monitor and report changes in neurological status  - Monitor vital signs such as temperature, blood pressure, glucose, and any other labs ordered   - Initiate measures to prevent increased intracranial pressure  - Monitor for seizure activity and implement precautions if appropriate      Outcome: Progressing     Problem: NEUROSENSORY - ADULT  Goal: Remains free of injury related to seizures activity  Description: INTERVENTIONS  - Maintain airway, patient safety  and administer oxygen as ordered  - Monitor patient for seizure activity, document and report duration and description of seizure to physician/advanced practitioner  - If seizure occurs,  ensure patient safety during seizure  - Reorient patient post seizure  - Seizure pads on all 4 side rails  - Instruct patient/family to notify RN of any seizure activity including if an aura is experienced  - Instruct patient/family to call for assistance with activity based on nursing assessment  - Administer anti-seizure medications if ordered    Outcome: Progressing     Problem: NEUROSENSORY - ADULT  Goal: Achieves maximal functionality and self care  Description: INTERVENTIONS  - Monitor swallowing and airway patency with patient fatigue and changes in neurological status  - Encourage and assist patient to increase activity and self care.    - Encourage visually impaired, hearing impaired and aphasic patients to use assistive/communication devices  Outcome: Progressing

## 2023-12-07 NOTE — ED NOTES
While transporting pt to ICU pt reports to this RN that the left side of his face is starting to feel numb, ICU RN made aware.      Tiffanie Brooks RN  12/06/23 2032

## 2023-12-07 NOTE — PLAN OF CARE
Problem: PHYSICAL THERAPY ADULT  Goal: Performs mobility at highest level of function for planned discharge setting. See evaluation for individualized goals. Description: Treatment/Interventions: ADL retraining, Functional transfer training, LE strengthening/ROM, Elevations, Therapeutic exercise, Endurance training, Patient/family training, Gait training, Bed mobility, Equipment eval/education, Compensatory technique education, Spoke to nursing, Spoke to case management, OT          See flowsheet documentation for full assessment, interventions and recommendations. Note: Prognosis: Good  Problem List: Decreased strength, Decreased endurance, Impaired balance, Decreased coordination, Decreased mobility, Impaired judgement, Decreased safety awareness, Obesity  Assessment: Pt is a 78 y.o. male seen for PT evaluation s/p admission to [address] [place] [address] on 12/6/2023 with Intraparenchymal hemorrhage of brain ([address] Order placed for PT services. Upon evaluation: Pt is presenting with impaired functional mobility due to decreased strength, decreased endurance, impaired balance, impaired coordination, gait deviations, decreased safety awareness, impaired judgment, and fall risk requiring  moderate assistance for bed mobility, steadying to moderate assistance for transfers, and moderate assistance for ambulation with out AD .  Pt's clinical presentation is currently unpredictable given the functional mobility deficits above, especially weakness, decreased endurance, impaired balance, gait deviations, decreased activity tolerance, decreased functional mobility tolerance, decreased safety awareness, and impaired judgement, coupled with fall risks as indicated by AM-PAC 6-Clicks: 01/06 as well as hx of falls, impaired balance, polypharmacy, impaired judgement, decreased safety awareness, and obesity and combined with medical complications of abnormal WBCs, need for input for mobility technique/safety, and Acute hemorrhage centered in the left thalamus and cerebral peduncle now with Stable left thalamic hematoma . Pt's PMHx and comorbidities that may affect physical performance and progress include: CKD, DM, HTN, obesity, and BPH . Personal factors affecting pt at time of IE include: inaccessible home environment, step(s) to enter environment, multi-level environment, limited home support, advanced age, inability to perform IADLs, inability to perform ADLs, inability to navigate level surfaces without external assistance, inability to ambulate household distances, and recent fall(s)/fall history. Pt will benefit from continued skilled PT services to address deficits as defined above and to maximize level of functional mobility to facilitate return toward PLOF and improved QOL. From PT/mobility standpoint, recommendation at time of d/c would be Level I (Maximum Resource Intensity) in order to reduce fall risk and maximize pt's functional independence and consistency with mobility. Recommend trial with walker next 1-2 sessions and ther ex next 1-2 sessions. Barriers to Discharge: Decreased caregiver support, Inaccessible home environment  Barriers to Discharge Comments: requires assistance ot complete all mobility, increased fall risk  Rehab Resource Intensity Level, PT: I (Maximum Resource Intensity)    See flowsheet documentation for full assessment.

## 2023-12-07 NOTE — RESPIRATORY THERAPY NOTE
RT Protocol Note  Yue Mak 78 y.o. male MRN: [id number]  Unit/Bed#: -01 Encounter: [id number]    Assessment    Active Problems:    Diabetes mellitus ([address])    Hypertension    Intraparenchymal hemorrhage of brain (HCC)    Class 3 severe obesity with body mass index (BMI) of 40.0 to 44.9 in adult [facility])    Benign prostatic hyperplasia without urinary obstruction    Stage 3a chronic kidney disease (HCC)      Home Pulmonary Medications:    Home Devices/Therapy: BiPAP/CPAP    Past Medical History:   Diagnosis Date    Diabetes mellitus ([address])     GERD (gastroesophageal reflux disease)     Hypertension      Social History     Socioeconomic History    Marital status: /Civil Union     Spouse name: None    Number of children: None    Years of education: None    Highest education level: None   Occupational History    None   Tobacco Use    Smoking status: Former     Packs/day: 5.00     Years: 15.00     Total pack years: 75.00     Types: Cigarettes    Smokeless tobacco: Never   Substance and Sexual Activity    Alcohol use: Not Currently    Drug use: Never    Sexual activity: None   Other Topics Concern    None   Social History Narrative    None     Social Determinants of Health     Financial Resource Strain: Not on file   Food Insecurity: Not on file   Transportation Needs: Not on file   Physical Activity: Not on file   Stress: Not on file   Social Connections: Not on file   Intimate Partner Violence: Not on file   Housing Stability: Not on file       Subjective         Objective    Physical Exam:   Assessment Type: Assess only  General Appearance: Awake, Lethargic  Respiratory Pattern: Normal  Chest Assessment: Chest expansion symmetrical  Bilateral Breath Sounds: Clear, Diminished  O2 Device: 2L NC    Vitals:  Blood pressure 124/61, pulse (!) 106, temperature 98.2 °F (36.8 °C), temperature source Oral, resp. rate 21, height 5' 10" (1.778 m), weight 130 kg (285 lb 11.5 oz), SpO2 94 %.           Imaging and other studies: I have personally reviewed pertinent reports. O2 Device: 2L NC     Plan    Respiratory Plan: No distress/Pulmonary history        Resp Comments: Pt admitted for weakness/fatigue with garbled speech. Pt former smoker (no COPD Dx). BASILIA on cpap (pt placed on CPAP +10 - no documentaion of home settings). Pteasy to wake but somewhat difficult to understand. BS dim/clear. VSS/SpO2 94%.

## 2023-12-07 NOTE — ASSESSMENT & PLAN NOTE
Patient is a 78-year-old male with medical history pertinent for hypertension diabetes GERD and BPH presenting with dizziness, increasing weakness and garbled speech. Last seen well 12/5/23 11AM, refused to go to hospital until he fell 12/6/23 w/o injury. Recently tx w/[facility] outpt for bronchitis. ER stroke alert called-pressure was 191/89 with aspirin yesterday NIH score of 2 and her CT revealed a small acute left basal ganglia and lateral midbrain IPH mild vasogenic edema. Repeat CT head 1 & 6h without interval IPH changes. No LVO spot signs, no vascular malformations  Repeat CT of head prn for new or worsening neuro deficits  BP goal 120-160  Cardene drip stopped 12/9  Currently on lopressor BID, Norvasc, Losartan, and PRN hydralazine 10mg IV  Elevate HOB 30+ degrees  Neurology is following  MRI 12/7: Stable left thalamic hemorrhage and surrounding vasogenic edema. Foci of chronic microhemorrhage in the left thalamus and left temporal subcortical white matter. 2. No evidence of solid lesion underlying the left thalamic hemorrhage.   Consider outpatient MRI in 8 to 12 weeks  Started DVT prophylaxis 12/9/23  PT/OT consulted

## 2023-12-07 NOTE — PLAN OF CARE
Problem: PHYSICAL THERAPY ADULT  Goal: Performs mobility at highest level of function for planned discharge setting. See evaluation for individualized goals. Description: Treatment/Interventions: ADL retraining, Functional transfer training, LE strengthening/ROM, Elevations, Therapeutic exercise, Endurance training, Patient/family training, Gait training, Bed mobility, Equipment eval/education, Compensatory technique education, Spoke to nursing, Spoke to case management, OT          See flowsheet documentation for full assessment, interventions and recommendations. 17/9/2070 2385 by Marti Santos PT  Note: Prognosis: Good  Problem List: Decreased strength, Decreased endurance, Impaired balance, Decreased coordination, Decreased mobility, Impaired judgement, Decreased safety awareness, Obesity  Pt tolerated session fairly. He was able to stand with decreased assistance, but continues to require increased assistance with spt and ambulation for safe completion with increased verbal cues for technique and safety. He fatigues quickly. He is limited by decreased strength, balance, endurance, coordination. He will continue to benefit from PT services to maximize LOF. Barriers to Discharge: Decreased caregiver support, Inaccessible home environment  Barriers to Discharge Comments: requires assistance ot complete all mobility, increased fall risk  Rehab Resource Intensity Level, PT: I (Maximum Resource Intensity)    See flowsheet documentation for full assessment.

## 2023-12-07 NOTE — QUICK NOTE
Called patient's son Yobany Almanza., who identified himself. We discussed the patient's current status and I answered the son's questions about various topics including the patient's need to try hard at rehab in the future to regain as much functionality as possible.  Son states he can get a list of the patient's medications to the hospital at some point.  -Freeman Gallegos MD

## 2023-12-07 NOTE — ASSESSMENT & PLAN NOTE
Continue Flomax 0.4 mg p.o.    With carbajal in place, will discuss with urology when patient should have a voiding trial.

## 2023-12-07 NOTE — PLAN OF CARE
Problem: OCCUPATIONAL THERAPY ADULT  Goal: Performs self-care activities at highest level of function for planned discharge setting. See evaluation for individualized goals. Description: Treatment Interventions: ADL retraining, Functional transfer training, Endurance training, Patient/family training, Equipment evaluation/education, Neuromuscular reeducation, Fine motor coordination activities, Compensatory technique education, Continued evaluation, Energy conservation, Activityengagement          See flowsheet documentation for full assessment, interventions and recommendations. Outcome: Progressing  Note: Limitation: Decreased ADL status, Decreased Safe judgement during ADL, Decreased endurance, Decreased fine motor control, Decreased self-care trans, Decreased high-level ADLs     Assessment: Pt is a 78 y.o. male, admitted to [address] [place] [address] 12/6/2023 d/t experiencing weakness. Dx: Intraparenchymal hemorrhage of brain. Pt with PMHx impacting their performance during ADL tasks, including: CKD 3a, BPH without urinary obstruction, severe obesity, HTN, DM. Prior to admission to the hospital Pt was performing ADLs without physical assistance. IADLs without physical assistance. Functional transfers/ambulation without physical assistance. Cognitive status PTA was [facility]. OT order placed to assess Pt's ADLs, cognitive status, and performance during functional tasks in order to maximize safety and independence while making most appropriate d/c recommendations. PT/OT co-evaluation completed at this time d/t significant mobility deficits and safety concerns.  Pt's clinical presentation is currently unstable/unpredictable given new onset deficits that affect Pt's occupational performance and ability to safely return to PLOF including decrease activity tolerance, decrease standing balance, decrease performance during ADL tasks, decrease activity engagement, decrease performance during functional transfers, decrease FM control, steps to enter home, limited family support, high fall risk, and limited insight to deficits combined with medical complications of abnormal renal lab values, abnormal CBC, new onset O2 use, decreased skin integrity, and need for input for mobility technique/safety. Personal factors affecting Pt at time of initial evaluation include: step(s) to enter environment, multi-level environment, limited home support, advanced age, inability to perform IADLs, inability to perform ADLs, new need for AD, inability to ambulate household distances, inability to navigate community distances, limited insight into impairments, decreased initiation and engagement, difficulty communicating, and recent fall(s)/fall history. Pt will benefit from continued skilled OT services to address deficits as defined above and to maximize level independence/participation during ADLs and functional tasks to facilitate return toward PLOF and improved quality of life.  From an occupational therapy standpoint, Level I (Maximum Resource Intensity) is recommended upon d/c.     Rehab Resource Intensity Level, OT: I (Maximum Resource Intensity)

## 2023-12-07 NOTE — ASSESSMENT & PLAN NOTE
Weaned off Cardene 12/9/23  Continue BB, Losartan  Home Norvasc increased to 10mg  May need to increase losartan to keep BP within goal.   Goal -160  Hydralazine PRN

## 2023-12-07 NOTE — ASSESSMENT & PLAN NOTE
Tachypnea into 20's-30's on arrival, sats >92% throughout hospital stay, RA->3L->2L currently. May be secondary to acute stress and body habitus.  Continue and wean as tolerated, sat goal >94% to ensure optimal brain oxygenation  Hx BASILIA, CPAP qhs

## 2023-12-07 NOTE — ASSESSMENT & PLAN NOTE
Lab Results   Component Value Date    HGBA1C 6.5 (H) 05/11/2023       Recent Labs     12/06/23  1831 12/06/23 2051   POCGLU 133 118       Blood Sugar Average: Last 72 hrs:  (P) 125.5    Insulin sliding scale   Globin A1c

## 2023-12-07 NOTE — SPEECH THERAPY NOTE
Speech Language/Pathology  Speech-Language Pathology Bedside Swallow Evaluation      Patient Name: Stefanie Park    HXWSF'E Date: 12/7/2023     Problem List  Principal Problem:    Intraparenchymal hemorrhage of brain ([address])  Active Problems:    Diabetes mellitus ([address])    Hypertension    Class 3 severe obesity with body mass index (BMI) of 40.0 to 44.9 in adult [facility])    Benign prostatic hyperplasia without urinary obstruction    Stage 3a chronic kidney disease ([address])    Tachypnea      Past Medical History  Past Medical History:   Diagnosis Date    Diabetes mellitus ([address])     GERD (gastroesophageal reflux disease)     Hypertension        Past Surgical History  Past Surgical History:   Procedure Laterality Date    HERNIA REPAIR         Summary   Pt presented with functional appearing oral and pharyngeal stage swallowing skills with materials administered today. OR s/s suggestive of moderate oral and suspected moderate pharyngeal dysphagia. Symptoms or concerns included decreased bolus cohesion and s/sx of impaired airway protection. Risk/s for Aspiration: weak cough, pulmonary status, reduced mobility, and severity of dysphagia     Recommended Diet: NPO except ice chips   Recommended Form of Meds: non-oral if possible   Aspiration precautions and swallowing strategies: n/a recommend NPO status temporarily   Other Recommendations: Continue frequent oral care; may allow ice chips ad porsche with stringent oral care        Current Medical Status  Pt is a 78 y.o. male who presented to [address] [place] [address] with PMH of HTN, DM and admitted with L thalmaic/midbrain CVA. CTH +; MRI was completed prior to evaluation with no known documented results yet. Current Precautions:  aspiration    Allergies:  nuts     Past medical history:  Please see H&P for details        Social/Education/Vocational Hx:  Pt lives with spouse/SO in a 3 story home.      Swallow Information   Current Risks for Dysphagia & Aspiration: CVA; no known hx of dysphagia Current Symptoms/Concerns: coughing with po  Current Diet: NPO   Baseline Diet: regular diet and thin liquids      Baseline Assessment   Behavior/Cognition: alert  Speech/Language Status:Pt reports this occurred at onset of CVA but felt it has since resolved. Pt continues to evident dysarthria speech; garbled. Patient Positioning: upright in bed  Pain Status/Interventions/Response to Interventions: No report of or nonverbal indications of pain. Swallow Mechanism Exam  Facial: right facial droop  Labial: WFL  Lingual: WFL  Velum: symmetrical  Mandible: adequate ROM  Dentition: edentulous; pt has bilateral dentures but only wears them when he 'is out' and not for eating   Vocal quality:hoarse   Volitional Cough: weak   Respiratory Status: on RA - stable 02      Consistencies Assessed and Performance   Consistencies Administered: ice chips, thin liquids, nectar thick, and puree  Materials administered included ice chips x2, 1oz applesauce, ~2oz water and <1oz nectar thick juice    Oral Stage: suspect passive flow of bolus   Mastication was adequate with the materials administered today. Bolus formation and transfer were functional with no significant oral residue noted. No overt s/s reduced oral control. Thought impressions limited by provided consistencies. Pharyngeal Stage: moderate  Swallow Mechanics:  Swallowing initiation appeared delayed. Laryngeal rise was palpated and judged to be within functional limits. Coughing noted with thin via tsp (first trial), consecutive sips of thin via edge of cup, and moderately thick from edge of cup (delayed ~1 min with congested cough. Pt endorsed feeling sick prior to hospitalization and contributed that to congestion. Cannot r/u aspiration at bedside but likely and with high risks.      Esophageal Concerns: none reported    Strategies and Efficacy: n/a     Summary and Recommendations (see above)    Results Reviewed with: patient and RN     Treatment Recommended: SLP services targeting dysphagia, motor speech evaluation     Frequency of treatment: 3-5x/week     Patient Stated Goal: 'to eat'     Dysphagia LTG  -Patient will demonstrate safe and effective oral intake (without overt s/s significant oral/pharyngeal dysphagia including s/s penetration or aspiration) for the highest appropriate diet level. Short Term Goals:  -Pt will tolerate Dysphagia 1/pureed diet and thin liquid with no significant s/s oral or pharyngeal dysphagia across 1-3 diagnostic session/s    -Patient will comply with a Video/Modified Barium Swallow study for more complete assessment of swallowing anatomy/physiology/aspiration risk and to assess efficacy of treatment techniques so as to best guide treatment plan    Further goals to be added upon establishing POC. Pt will likely benefit from VFSS in the days ahead and will contribute to goal planning.          Speech Therapy Prognosis   Prognosis: fair    Prognosis Considerations: age and medical status

## 2023-12-07 NOTE — ASSESSMENT & PLAN NOTE
Lab Results   Component Value Date    EGFR 52 12/07/2023    EGFR 45 12/06/2023    CREATININE 1.25 12/07/2023    CREATININE 1.40 (H) 12/06/2023     His baseline creatinine is 1.4-1.5  Continue to monitor creatinine due to dye load

## 2023-12-07 NOTE — CASE MANAGEMENT
Case Management Assessment & Discharge Planning Note    Patient name Reed Betancourt  Location /-55 MRN [id number]  : 1940 Date 2023       Current Admission Date: 2023  Current Admission Diagnosis:Intraparenchymal hemorrhage of brain [facility])   Patient Active Problem List    Diagnosis Date Noted    Tachypnea 2023    Diabetes mellitus ([address]) 2023    Hypertension 2023    Intraparenchymal hemorrhage of brain ([address]) 2023    Class 3 severe obesity with body mass index (BMI) of 40.0 to 44.9 in adult [facility]) 2023    Stage 3a chronic kidney disease ([address] [facility]) 2023    Benign prostatic hyperplasia without urinary obstruction 2020      LOS (days): 1  Geometric Mean LOS (GMLOS) (days):   Days to GMLOS:     OBJECTIVE:    Risk of Unplanned Readmission Score: 9.34         Current admission status: Inpatient       Preferred Pharmacy:   [address] [place] [address] Dr  [address]  Phone: [phone] Fax: [phone]    Primary Care Provider: Priyank Edmonds MD    Primary Insurance: Eduarda Anaya  Secondary Insurance:     ASSESSMENT:  [facility] , [address] Representative - Son   Primary Phone: [phone] (Mobile)                 Advance Directives  Does patient have a [facility]?: No  Was patient offered paperwork?: Yes (left at bedside for son)  Does patient currently have a Health Care decision maker?: Yes, please see Health Care Proxy section  Does patient have Advance Directives?: No  Was patient offered paperwork?: Yes (left at bedside for son)  Primary Contact: Maryjo Buchanan         Readmission Root Cause  30 Day Readmission: No    Patient Information  Admitted from[de-identified] Home  Mental Status: Alert  During Assessment patient was accompanied by: Not accompanied during assessment  Assessment information provided by[de-identified] Patient, Son (spoke with son via phone)  Primary Caregiver: Self  Support Systems: Self, Spouse/significant other, Son  [place] of Residence: [address] [place] [address] do you live in?: [address] [employer] entry access options.  Select all that apply.: Stairs  Number of steps to enter home.: 4  Do the steps have railings?: Yes  Type of Current Residence: [address] [place] home  Upon entering residence, is there a bedroom on the main floor (no further steps)?: No  A bedroom is located on the following floor levels of residence (select all that apply):: 2nd Floor  Upon entering residence, is there a bathroom on the main floor (no further steps)?: No  Indicate which floors of current residence have a bathroom (select all the apply):: 2nd Floor  Number of steps to 2nd floor from main floor: One Flight  Living Arrangements: Lives w/ Spouse/significant other  Is patient a ?: Yes  Is patient active with [address] [place] [address])?: No    Activities of Daily Living Prior to Admission  Functional Status: Independent  Completes ADLs independently?: Yes  Ambulates independently?: Yes  Does patient use assisted devices?: Yes  Assisted Devices (DME) used: STANISLAW Kimbrough  DME Company Name (respiratory supplies): [employer]  Does patient currently own DME?: Yes  What DME does the patient currently own?: Jerilyn Edwards  Does patient have a history of Outpatient Therapy (PT/OT)?: No  Does the patient have a history of Short-Term Rehab?: No  Does patient have a history of HHC?: No  Does patient currently have [facility]?: No         Patient Information Continued  Income Source: SSI/SSD  Does patient have prescription coverage?: Yes  Does patient receive dialysis treatments?: No  Does patient have a history of substance abuse?: No  Does patient have a history of Mental Health Diagnosis?: No         Means of Transportation  Means of Transport to [facility][de-identified] [address] Stability: Low Risk  (12/7/2023)    Housing Stability Vital Sign     Unable to Pay for Housing in the Last Year: No Number of Places Lived in the Last Year: 1     Unstable Housing in the Last Year: No   Food Insecurity: No Food Insecurity (12/7/2023)    Hunger Vital Sign     Worried About Running Out of Food in the Last Year: Never true     Ran Out of Food in the Last Year: Never true   Transportation Needs: No Transportation Needs (12/7/2023)    PRAPARE - Transportation     Lack of Transportation (Medical): No     Lack of Transportation (Non-Medical): No   Utilities: Not At Risk (12/7/2023)    [facility] Utilities     Threatened with loss of utilities: No       DISCHARGE DETAILS:    Discharge planning discussed with[de-identified] patient at bedside, son via phone  Freedom of Choice: Yes  Comments - Freedom of Choice: agreeable to blanket referral to [facility]. pt preference is [facility]  CM contacted family/caregiver?: Yes  Were Treatment Team discharge recommendations reviewed with patient/caregiver?: Yes  Did patient/caregiver verbalize understanding of patient care needs?: Yes  Were patient/caregiver advised of the risks associated with not following Treatment Team discharge recommendations?: Yes    Contacts  Contact Method: In Person, Phone  Reason/Outcome: Discharge Planning              Other Referral/Resources/Interventions Provided:  Interventions: Acute Rehab    Would you like to participate in our [address] [facility] [address] service program?  : No - Declined    Treatment Team Recommendation: Acute Rehab  Discharge Destination Plan[de-identified] Acute Rehab   Cm met with the patient to evaluate the patients prior function and living situation and any barriers to d/c and form a safe d/c plan. Cm also evaluated the patient for any services in the home or needs for services. CM also discussed with patient that their preferences will be taken into account/consideration. Pt admitted with intraparenchymal hemorrhage of brain. Neurology following. PT/OT evals recommending acute rehab. Pt and son agreeable to blanket referrals to acute rehab.  Cm to follow for all discharge needs.

## 2023-12-07 NOTE — ED PROVIDER NOTES
History  Chief Complaint   Patient presents with    Weakness - Generalized     Pt is just getting over bronchitis. States today he woke up feeling extremely weak and fatigued. His son tried to get him into the car but he patient was too weak  to ambulate. Denies any other symptoms. The patient is a 78 year male with a past medical history of hypertension, diabetes, on aspirin 81 mg who presents via EMS from home for the concern of strokelike symptoms and weakness. The patient or EMS was sleeping a lot today and overall very weak. The patient was unable to ambulate without significant assistance and was brought in for evaluation after the patient was "lowered to the ground". The patient states that he feels slightly lightheaded today and overall very tired was sleeping a lot. Was recently treated for bronchitis and finished antibiotics 2 days ago. Denies any chest pain, nausea vomiting, headache, abdominal pain back pain. Per  son the patient went to sleep around 11 PM yesterday was normal but today when he came over to the house he noticed that his father had some garbled speech. Seem to have facial droop. Did convince the father that he would need to go to the ER and the patient began to feel lightheaded with ambulation and was lowered to the ground. No LOC or head strike.            History provided by:  EMS personnel, patient and relative  CVA/TIA-like Symptoms  Presenting symptoms: language symptoms and weakness    Language symptoms:     Difficulty in expression: no      Difficulty understanding: no      Slurring of speech: no      Severity:  Moderate (garbled speech)  Weakness:     Duration:  1 day    Timing:  Constant  Date/time of last known well:  12/5/2023 11:00 PM  Similar to previous episodes: no    Associated symptoms: dizziness    Associated symptoms: no chest pain, no fever, no hearing loss and no paresthesias        Prior to Admission Medications   Prescriptions Last Dose Informant Patient Reported? Taking? albuterol (ProAir HFA) 90 mcg/act inhaler   No No   Sig: Inhale 2 puffs every 6 (six) hours as needed for wheezing or shortness of breath   aspirin (ECOTRIN LOW STRENGTH) 81 mg EC tablet   Yes No   Sig: Take 81 mg by mouth in the morning. levothyroxine 200 mcg tablet   Yes No   lisinopril (ZESTRIL) 10 mg tablet   Yes No   Sig: Take 10 mg by mouth in the morning. metFORMIN (GLUCOPHAGE) 1000 MG tablet   Yes No   Sig: Take 1,000 mg by mouth in the morning and 1,000 mg before bedtime. metoprolol succinate (TOPROL-XL) 50 mg 24 hr tablet   Yes No   omeprazole (PriLOSEC) 40 MG capsule   Yes No   tamsulosin (FLOMAX) 0.4 mg   Yes No   Sig: Take 0.4 mg by mouth daily   valsartan (DIOVAN) 40 mg tablet   Yes No      Facility-Administered Medications: None       Past Medical History:   Diagnosis Date    Diabetes mellitus (HCC)     GERD (gastroesophageal reflux disease)     Hypertension        Past Surgical History:   Procedure Laterality Date    HERNIA REPAIR         Family History   Problem Relation Age of Onset    Heart disease Mother     Heart disease Father      I have reviewed and agree with the history as documented. E-Cigarette/Vaping    E-Cigarette Use Never User      E-Cigarette/Vaping Substances     Social History     Tobacco Use    Smoking status: Former    Smokeless tobacco: Never   Vaping Use    Vaping Use: Never used   Substance Use Topics    Alcohol use: Not Currently    Drug use: Never       Review of Systems   Constitutional:  Negative for fever. HENT:  Negative for hearing loss. Cardiovascular:  Negative for chest pain. Neurological:  Positive for dizziness and weakness. Negative for paresthesias. All other systems reviewed and are negative. Physical Exam  Physical Exam  Vitals and nursing note reviewed. Constitutional:       General: He is not in acute distress. Appearance: He is well-developed. HENT:      Head: Normocephalic and atraumatic. Mouth/Throat:      Mouth: Mucous membranes are dry. Eyes:      Extraocular Movements: Extraocular movements intact. Pupils: Pupils are equal, round, and reactive to light. Cardiovascular:      Rate and Rhythm: Normal rate and regular rhythm. Pulses: Normal pulses. Heart sounds: No murmur heard. Pulmonary:      Effort: Pulmonary effort is normal. No respiratory distress. Breath sounds: Examination of the right-lower field reveals rales. Examination of the left-lower field reveals rales. Rales present. Abdominal:      General: Bowel sounds are normal. There is distension. Palpations: Abdomen is soft. Tenderness: There is no abdominal tenderness. Musculoskeletal:      Cervical back: Normal range of motion. Right lower leg: Edema present. Left lower leg: Edema present. Skin:     General: Skin is warm and dry. Capillary Refill: Capillary refill takes less than 2 seconds. Neurological:      General: No focal deficit present. Mental Status: He is alert and oriented to person, place, and time. Cranial Nerves: Facial asymmetry present. Sensory: Sensation is intact. Motor: Motor function is intact. No abnormal muscle tone or pronator drift.       Comments: Garbled speech and left sided facial droop    Psychiatric:         Behavior: Behavior normal.         Vital Signs  ED Triage Vitals [12/06/23 1749]   Temperature Pulse Respirations Blood Pressure SpO2   97.8 °F (36.6 °C) 78 20 (!) 191/89 95 %      Temp Source Heart Rate Source Patient Position - Orthostatic VS BP Location FiO2 (%)   Oral Monitor Sitting Left arm --      Pain Score       --           Vitals:    12/06/23 1911 12/06/23 1915 12/06/23 1930 12/06/23 1945   BP: 157/78 148/69 142/63 155/72   Pulse:  84 87 88   Patient Position - Orthostatic VS:  Lying Lying Lying         Visual Acuity  Visual Acuity      Flowsheet Row Most Recent Value   L Pupil Size (mm) 2   R Pupil Size (mm) 2 ED Medications  Medications   desmopressin (DDAVP) 39.2 mcg in sodium chloride 0.9 % 50 mL IVPB (39.2 mcg Intravenous New Bag 12/6/23 1952)   insulin lispro (HumaLOG) 100 units/mL subcutaneous injection 1-6 Units (has no administration in time range)   labetalol (NORMODYNE) injection 10 mg (has no administration in time range)   iohexol (OMNIPAQUE) 350 MG/ML injection (MULTI-DOSE) 100 mL (100 mL Intravenous Given 12/6/23 1818)   hydrALAZINE (APRESOLINE) injection 10 mg (10 mg Intravenous Given 12/6/23 1836)   hydrALAZINE (APRESOLINE) injection 10 mg (10 mg Intravenous Given 12/6/23 1857)       Diagnostic Studies  Results Reviewed       Procedure Component Value Units Date/Time    FLU/RSV/COVID - if FLU/RSV clinically relevant [[id number]]  (Normal) Collected: 12/06/23 1756    Lab Status: Final result Specimen: Nares from Nose Updated: 12/06/23 1840     SARS-CoV-2 Negative     INFLUENZA A PCR Negative     INFLUENZA B PCR Negative     RSV PCR Negative    Narrative:      FOR PEDIATRIC PATIENTS - copy/paste COVID Guidelines URL to browser: [email or web address]    SARS-CoV-2 assay is a Nucleic Acid Amplification assay intended for the  qualitative detection of nucleic acid from SARS-CoV-2 in nasopharyngeal  swabs. Results are for the presumptive identification of SARS-CoV-2 RNA. Positive results are indicative of infection with SARS-CoV-2, the virus  causing COVID-19, but do not rule out bacterial infection or co-infection  with other viruses. Laboratories within the [facility] and its  territories are required to report all positive results to the appropriate  public health authorities. Negative results do not preclude SARS-CoV-2  infection and should not be used as the sole basis for treatment or other  patient management decisions. Negative results must be combined with  clinical observations, patient history, and epidemiological information.   This test has not been FDA cleared or approved. This test has been authorized by FDA under an Emergency Use Authorization  (EUA). This test is only authorized for the duration of time the  declaration that circumstances exist justifying the authorization of the  emergency use of an in vitro diagnostic tests for detection of SARS-CoV-2  virus and/or diagnosis of COVID-19 infection under section 564(b)(1) of  the Act, 21 U. S.C. 972MCL-7(Z)(9), unless the authorization is terminated  or revoked sooner. The test has been validated but independent review by FDA  and CLIA is pending. Test performed using [employer] [facility] GeneXpert: This RT-PCR assay targets N2,  a region unique to SARS-CoV-2. A conserved region in the E-gene was chosen  for pan-Sarbecovirus detection which includes SARS-CoV-2. According to CMS-2020-01-R, this platform meets the definition of high-throughput technology. B-Type Natriuretic Peptide(BNP) [[id number]]  (Normal) Collected: 12/06/23 1756    Lab Status: Final result Specimen: Blood from Arm, Right Updated: 12/06/23 1840     BNP 33 pg/mL     HS Troponin 0hr (reflex protocol) [[id number]]  (Normal) Collected: 12/06/23 1756    Lab Status: Final result Specimen: Blood from Arm, Right Updated: 12/06/23 1835     hs TnI 0hr 6 ng/L     Fingerstick Glucose (POCT) [[id number]]  (Normal) Collected: 12/06/23 1831    Lab Status: Final result Updated: 12/06/23 1834     POC Glucose 133 mg/dl     Lactic acid [[id number]]  (Normal) Collected: 12/06/23 1756    Lab Status: Final result Specimen: Blood from Arm, Right Updated: 12/06/23 1829     LACTIC ACID 1.1 mmol/L     Narrative:      Result may be elevated if tourniquet was used during collection.     Comprehensive metabolic panel [[id number]]  (Abnormal) Collected: 12/06/23 1756    Lab Status: Final result Specimen: Blood from Arm, Right Updated: 12/06/23 1829     Sodium 139 mmol/L      Potassium 4.1 mmol/L      Chloride 104 mmol/L      CO2 29 mmol/L      ANION GAP 6 mmol/L BUN 23 mg/dL      Creatinine 1.40 mg/dL      Glucose 128 mg/dL      Calcium 9.2 mg/dL      AST 18 U/L      ALT 16 U/L      Alkaline Phosphatase 55 U/L      Total Protein 7.9 g/dL      Albumin 4.2 g/dL      Total Bilirubin 0.61 mg/dL      eGFR 45 ml/min/1.73sq m     Narrative:      [facility] guidelines for Chronic Kidney Disease (CKD):     Stage 1 with normal or high GFR (GFR > 90 mL/min/1.73 square meters)    Stage 2 Mild CKD (GFR = 60-89 mL/min/1.73 square meters)    Stage 3A Moderate CKD (GFR = 45-59 mL/min/1.73 square meters)    Stage 3B Moderate CKD (GFR = 30-44 mL/min/1.73 square meters)    Stage 4 Severe CKD (GFR = 15-29 mL/min/1.73 square meters)    Stage 5 End Stage CKD (GFR <15 mL/min/1.73 square meters)  Note: GFR calculation is accurate only with a steady state creatinine    Magnesium [[id number]]  (Normal) Collected: 12/06/23 1756    Lab Status: Final result Specimen: Blood from Arm, Right Updated: 12/06/23 1829     Magnesium 1.9 mg/dL     Blood culture #1 [[id number]] Collected: 12/06/23 1758    Lab Status:  In process Specimen: Blood from Hand, Left Updated: 12/06/23 1828    Protime-INR [[id number]]  (Normal) Collected: 12/06/23 1756    Lab Status: Final result Specimen: Blood from Arm, Right Updated: 12/06/23 1823     Protime 13.3 seconds      INR 0.98    APTT [[id number]]  (Normal) Collected: 12/06/23 1756    Lab Status: Final result Specimen: Blood from Arm, Right Updated: 12/06/23 1823     PTT 32 seconds     CBC and differential [[id number]]  (Abnormal) Collected: 12/06/23 1756    Lab Status: Final result Specimen: Blood from Arm, Right Updated: 12/06/23 1806     WBC 8.14 Thousand/uL      RBC 5.26 Million/uL      Hemoglobin 14.1 g/dL      Hematocrit 45.8 %      MCV 87 fL      MCH 26.8 pg      MCHC 30.8 g/dL      RDW 15.2 %      MPV 9.9 fL      Platelets 836 Thousands/uL      nRBC 0 /100 WBCs      Neutrophils Relative 60 %      Immat GRANS % 0 %      Lymphocytes Relative 29 % Monocytes Relative 8 %      Eosinophils Relative 2 %      Basophils Relative 1 %      Neutrophils Absolute 4.92 Thousands/µL      Immature Grans Absolute 0.02 Thousand/uL      Lymphocytes Absolute 2.35 Thousands/µL      Monocytes Absolute 0.61 Thousand/µL      Eosinophils Absolute 0.19 Thousand/µL      Basophils Absolute 0.05 Thousands/µL     Procalcitonin [[id number]] Collected: 12/06/23 1756    Lab Status: In process Specimen: Blood from Arm, Right Updated: 12/06/23 1803    Blood culture #2 [[id number]] Collected: 12/06/23 1756    Lab Status: In process Specimen: Blood from Arm, Right Updated: 12/06/23 1802    UA w Reflex to Microscopic w Reflex to Culture [[id number]]     Lab Status: No result Specimen: Urine                    CTA stroke alert (head/neck)   Final Result by Jaspal Ayala MD (12/06 1916)      No stenosis, dissection or occlusion of the carotid or vertebral arteries or major vessels of the [place]. No intracranial aneurysm or vascular malformation identified. I personally discussed this study with Kevin Pritchett and LLOYD GRIFFITHS  on 12/6/2023 7:11 PM.                           Workstation performed: [id number]         CT chest abdomen pelvis w contrast   Final Result by Lilly Sarabia DO (12/06 1918)      1. Mild emphysema. No consolidation or mass. 2. 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 Recommendation is for follow-up low radiation dose chest CT without contrast in one year. 3. Two nonobstructing calculi in the distal right ureter, the more cephalad calculus measuring at least 1 cm, the more inferior calculus measures about 6 mm. No hydroureter or hydronephrosis. Additional incidental findings as above. The study was marked in [facility] for follow-up. Workstation performed: [id number]         CT stroke alert brain   Final Result by Jaspal Ayala MD (12/06 1916)         1.  Acute hemorrhage centered in the left thalamus and cerebral peduncle measuring 1.3 x 1.3 x 1.5 cm. Minimal surrounding vasogenic edema.          I personally discussed this study with Juancarlos David and LLOYD GRIFFITHS on 12/6/2023 6:56 PM.            Workstation performed: [id number]         CT head wo contrast    (Results Pending)              Procedures  ECG 12 Lead Documentation Only    Date/Time: 12/6/2023 7:32 PM    Performed by: Juancarlos David PA-C  Authorized by: Juancarlos David PA-C    Indications / Diagnosis:  Stroke like symptoms  Patient location:  ED  Previous ECG:     Previous ECG:  Unavailable  Interpretation:     Interpretation: normal    Rate:     ECG rate:  74    ECG rate assessment: normal    Rhythm:     Rhythm: sinus rhythm and A-V block    Conduction:     Conduction: abnormal      Abnormal conduction: complete RBBB, 1st degree, LAFB and bifascicular block    CriticalCare Time    Date/Time: 12/6/2023 7:43 PM    Performed by: Juancarlos David PA-C  Authorized by: Juancarlos David PA-C    Critical care provider statement:     Critical care time (minutes):  120    Critical care time was exclusive of:  Separately billable procedures and treating other patients    Critical care was necessary to treat or prevent imminent or life-threatening deterioration of the following conditions:  CNS failure or compromise    Critical care was time spent personally by me on the following activities:  Development of treatment plan with patient or surrogate, blood draw for specimens, obtaining history from patient or surrogate, evaluation of patient's response to treatment, discussions with primary provider, discussions with consultants, examination of patient, ordering and performing treatments and interventions, interpretation of cardiac output measurements, re-evaluation of patient's condition, ordering and review of radiographic studies, ordering and review of laboratory studies and review of old charts    I assumed direction of critical care for this patient from another provider in my specialty: no             ED Course  ED Course as of 12/06/23 2002   Wed Dec 06, 2023   1813 Dr. Windsor Felty with neurology contacted [facility] 2   1849 Creatinine(!): 1.40   1849 Dr. Windsor Felty neurology did review the imaging and stated that there is a very small bleed in the midbrain which could most likely be secondary to his high blood pressure. Recommendation was to reverse aspirin but patient did not take this therefore no need for DDAVP   1850 , admit with frequent neurochecks and BP less than 140/90 repeat head CT in 6 hours. 1851 Creatinine(!): 1.40  1.38 in 5/ 2023 1916 Dr. Uyen Perez accepted patient                                SBIRT 22yo+      Flowsheet Row Most Recent Value   Initial Alcohol Screen: US AUDIT-C     1. How often do you have a drink containing alcohol? 0 Filed at: 12/06/2023 1914   2. How many drinks containing alcohol do you have on a typical day you are drinking? 0 Filed at: 12/06/2023 1914   3b. FEMALE Any Age, or MALE 65+: How often do you have 4 or more drinks on one occassion? 0 Filed at: 12/06/2023 1914   Audit-C Score 0 Filed at: 12/06/2023 1914   SENTHIL: How many times in the past year have you. .. Used an illegal drug or used a prescription medication for non-medical reasons? Never Filed at: 12/06/2023 1914                      Medical Decision Making  The patient is a 78 year male with a past medical history of hypertension, diabetes, on aspirin 81 mg who presents via EMS from home for the concern of strokelike symptoms and weakness. The patient or EMS was sleeping a lot today and overall very weak. The patient was unable to ambulate without significant assistance and was brought in for evaluation after the patient was "lowered to the ground". The patient states that he feels slightly lightheaded today and overall very tired was sleeping a lot.   Was recently treated for bronchitis and finished antibiotics 2 days ago.  Denies any chest pain, nausea vomiting, headache, abdominal pain back pain. Per  son the patient went to sleep around 11 PM yesterday was normal but today when he came over to the house he noticed that his father had some garbled speech. Seem to have facial droop. Did convince the father that he would need to go to the ER and the patient began to feel lightheaded with ambulation and was lowered to the ground. No LOC or head strike. On examination Garbled speech and left sided facial droop. Distended abdomen but soft. Rales on examination     NIH scale, 2.    spoke with the ICU team for admission and accepted under Dr. Jeni Guzmán and accepted     Differential diagnosis included but not limited to bacteremia, CHF, pneumonia, URI, dehydration, intracranial hemorrhage, stroke, TIA    Due to the patient's neurologic symptoms per son the patient was a stroke alert in the ER, Dr. Kimberley Mejias with neurology recommendation was CTA and CT head which were performed and found to have a very small left-sided ophthalmic hemorrhage most likely secondary to hypertension. Recommendation was obtain CT head in 6 hours to monitor and to control the high blood pressure range was less than 140/90. Spoke with Dr. Jeni Guzmán with ICU and accepted admission under step down 1     Amount and/or Complexity of Data Reviewed  Independent Historian: parent and EMS  External Data Reviewed: radiology. Labs: ordered. Decision-making details documented in ED Course. Radiology: ordered and independent interpretation performed. Decision-making details documented in ED Course. ECG/medicine tests: ordered and independent interpretation performed. Decision-making details documented in ED Course. Discussion of management or test interpretation with external provider(s): Dr. Jeni Rider drug management. Decision regarding hospitalization.              Disposition  Final diagnoses:   Garbled speech   Thalamic hemorrhage [facility])     Time reflects when diagnosis was documented in both MDM as applicable and the Disposition within this note       Time User Action Codes Description Comment    12/6/2023  6:07 PM Acquanetta Sermon Add [R47.89] Garbled speech     12/6/2023  7:16 PM Acquanetta Sermon Add [I61.0] Thalamic hemorrhage ([address])     12/6/2023  7:16 PM Acquanetta Sermon Modify [R47.89] Garbled speech     12/6/2023  7:16 PM Acquanetta Sermon Modify [I61.0] Thalamic hemorrhage [facility])           ED Disposition       ED Disposition   Admit    Condition   Stable    Date/Time   Wed Dec 6, 2023 1916    Comment   Case was discussed with rishi  and the patient's admission status was agreed to be Admission Status: inpatient status to the service of Dr. Yuly Ang . Follow-up Information    None         Patient's Medications   Discharge Prescriptions    No medications on file       No discharge procedures on file.     PDMP Review       None            ED Provider  Electronically Signed by             Jaden Sin PA-C  12/06/23 [facility] Ailin Mukherjee PA-C  12/06/23 2002

## 2023-12-07 NOTE — ASSESSMENT & PLAN NOTE
Lab Results   Component Value Date    EGFR 45 12/06/2023    CREATININE 1.40 (H) 12/06/2023     His baseline creatinine is 1.4-1.5  Continue to monitor creatinine due to dye load

## 2023-12-07 NOTE — ASSESSMENT & PLAN NOTE
Lab Results   Component Value Date    HGBA1C 6.5 (H) 05/11/2023       Recent Labs     12/06/23  1831 12/06/23 2051 12/06/23  2350 12/07/23  0542   POCGLU 133 118 120 111         Blood Sugar Average: Last 72 hrs:  (P) 120.5    Insulin sliding scale   Globin A1c

## 2023-12-07 NOTE — ASSESSMENT & PLAN NOTE
Lab Results   Component Value Date    HGBA1C 6.5 (H) 05/11/2023       Recent Labs     12/06/23 2051 12/06/23  2350 12/07/23  0542 12/07/23  1147   POCGLU 118 120 111 114       Blood Sugar Average: Last 72 hrs:  (P) 119.2    Insulin sliding scale algorithm 3  Holding home Metformin  Monitor BG

## 2023-12-07 NOTE — ASSESSMENT & PLAN NOTE
Tachypnea into 20's-30's on arrival, sats >92% throughout hospital stay, RA->3L->2L currently. May be secondary to acute stress and body habitus.   Hx BASILIA, CPAP qhs

## 2023-12-07 NOTE — CONSULTS
e-Consult (IPC)   Huy Gomez 78 y.o. male MRN: [id number]  Unit/Bed#: -01 Encounter: [id number]      Reason for Consult / Principal Problem: left thalamic/mid brain acute bleed  Inpatient consult to Neurology  Consult performed by: Denys Sutton DO  Consult ordered by: Sav Baum PA-C        12/07/23      ASSESSMENT:  Mr. Huy Gomez is a 79 yo male with HTN, DM presenting with dysarthria and fatigue on 12/6/23 evening with last known normal of 12/5/23. Patient with CT H no contrast showing acute left thalamic/ midbrain IPH with no midline shift or intraventricular extension noted. Patient initially hypertensive SBP 190s now with medical management 130s SBP  CT H no contrast repeat has remained stable  CTA H/N does not show any vascular malformation  I am told patient clinically stable and will likely move out of ICU soon    RECOMMENDATIONS:  At this time given location and history etiology of  bleed most likely hypertensive.  - SBP < 140 recommended  - Continue to hold AP/AC including DVT prophylaxis. As stated in initial overnight stroke alert note- 48 hours from initial presentation if bleed stable/ resolving okay to start DVT prophylaxis. - MRI Brain w/w/o contrast routine recommended to make sure no underlying lesion, will follow up once completed  - Recommend telemetry for at least 48 hours since initial admission to make sure no PAF vs other arrythmia  - PT/OT/SLP to evaluate patient      11-20 minutes, >50% of the total time devoted to medical consultative verbal/EMR discussion between providers. Written report will be generated in the EMR.      Pt will follow up with OP vascular neurology attending or AP within 6 weeks of discharge    Denys Sutton DO

## 2023-12-07 NOTE — PROGRESS NOTES
[address] [facility]  Progress Note  Name: Sarai Dean  MRN: [id number]  Unit/Bed#: -01 I Date of Admission: 12/6/2023   Date of Service: 12/7/2023 I Hospital Day: 1    Assessment/Plan   * Intraparenchymal hemorrhage of brain [facility])  Assessment & Plan  Patient is a 64-year-old male with medical history pertinent for hypertension diabetes GERD and BPH denting with dizziness increasing weakness and garbled speech, last well 12/5/23 11AM, refused to go to hospital until he fell 12/6/23 w/o injury. Recently tx w/azithro outpt for bronchitis. ER stroke alert called-pressure was 191/89 with aspirin yesterday NIH score of 2 and her CT revealed a small acute left basal ganglia and lateral midbrain IPH mild vasogenic edema. Repeat CT head 1 & 6h without interval IPH changes. No LVO spot signs, no vascular malformations  Repeat CT of head prn for new or worsening neuro deficits  BP goal <140/90, s/p IV labetalol & hydralazine, cardene drip stopped, currently on lopressor BID, prn hydralazine 10mg IV  Elevate HOB 30+ degrees  Neurology is following  Hold DVT prophylaxis for the first 48 hours  Hold antiplatelets and anticoagulation  Sitter outpatient MRI in 8 to 12 weeks      Hypertension  Assessment & Plan  When  patient is able to take p.o. continue valsartan and metoprolol. Family expected to confirm home medication regimen with the team today    Tachypnea  Assessment & Plan  Tachypnea into 20's-30's on arrival, sats >92% throughout hospital stay, RA->3L->2L currently. May be secondary to acute stress and body habitus.   Hx BASILIA, CPAP qhs    Stage 3a chronic kidney disease [facility])  Assessment & Plan  Lab Results   Component Value Date    EGFR 52 12/07/2023    EGFR 45 12/06/2023    CREATININE 1.25 12/07/2023    CREATININE 1.40 (H) 12/06/2023     His baseline creatinine is 1.4-1.5  Continue to monitor creatinine due to dye load     Benign prostatic hyperplasia without urinary obstruction  Assessment & Plan  Continue Flomax 0.4 mg p.o. Class 3 severe obesity with body mass index (BMI) of 40.0 to 44.9 in adult [facility])  Assessment & Plan  Dietary and lifestyle counseling recommended prior to discharge    Diabetes mellitus [facility])  Assessment & Plan  Lab Results   Component Value Date    HGBA1C 6.5 (H) 05/11/2023       Recent Labs     12/06/23  1831 12/06/23  2051 12/06/23  2350 12/07/23  0542   POCGLU 133 118 120 111         Blood Sugar Average: Last 72 hrs:  (P) 120.5    Insulin sliding scale   Globin A1c               Disposition: Stepdown Level 2    ICU Core Measures     A: Assess, Prevent, and Manage Pain Has pain been assessed? Yes  Need for changes to pain regimen? No   B: Both SAT/SAT  N/A   C: Choice of Sedation RASS Goal: 0 Alert and Calm  Need for changes to sedation or analgesia regimen? No   D: Delirium CAM-ICU: Negative, meets only criteria 1 + 2, does not meet 3 or 4   E: Early Mobility  Plan for early mobility? No, patient to be in bed rest at this stage of intracerebral hemorrhage   F: Family Engagement Plan for family engagement today? Yes         Prophylaxis:  VTE Contraindicated secondary to: intraparenchymal hemorrhage   Stress Ulcer  covered bypantoprazole (PROTONIX) EC tablet 40 mg [[id number]]         Significant 24hr Events     24hr events: Overnight patient was admitted. He has had a fluctuating picture of neurologic deficits on exam and by complaint since arrival. Currently he feels his right side, which he was initially complaining of weakness in, is his good side. He is right handed. He has no new or worsening complaints. Subjective   Review of Systems   Constitutional:  Negative for chills and fever. HENT:  Negative for ear pain and sore throat. Eyes:  Negative for pain and visual disturbance. Respiratory:  Negative for cough and shortness of breath. Cardiovascular:  Negative for chest pain and palpitations.    Gastrointestinal:  Negative for abdominal pain, constipation, diarrhea, nausea and vomiting. Genitourinary:  Negative for dysuria and hematuria. Musculoskeletal:  Negative for arthralgias and back pain. Skin:  Negative for color change, pallor and rash. Neurological:  Positive for facial asymmetry, speech difficulty, weakness and numbness. Negative for dizziness, tremors, seizures, syncope, light-headedness and headaches. Psychiatric/Behavioral:  Negative for agitation and confusion. All other systems reviewed and are negative. Objective                            Vitals I/O      Most Recent Min/Max in 24hrs   Temp 97.6 °F (36.4 °C) Temp  Min: 97.6 °F (36.4 °C)  Max: 98.2 °F (36.8 °C)   Pulse 78 Pulse  Min: 73  Max: 114   Resp 22 Resp  Min: 12  Max: 36   /58 BP  Min: 110/62  Max: 191/89   O2 Sat 95 % SpO2  Min: 93 %  Max: 98 %      Intake/Output Summary (Last 24 hours) at 12/7/2023 0832  Last data filed at 12/7/2023 0024  Gross per 24 hour   Intake 120.84 ml   Output 900 ml   Net -779.16 ml       Diet NPO    Invasive Monitoring   N/a        Physical Exam   Physical Exam  Vitals reviewed. Eyes:      General: Vision grossly intact. No dysconjugate gaze, no foreign body in eye and neglectNo visual field deficit or scleral icterus. Right eye: No discharge. Left eye: No discharge. Extraocular Movements: Extraocular movements intact. Conjunctiva/sclera: Conjunctivae normal.      Pupils: Pupils are equal, round, and reactive to light. Skin:     General: Skin is warm and dry. Coloration: Skin is not jaundiced or pale. HENT:      Head: Normocephalic and atraumatic. Right Ear: Hearing normal.      Left Ear: Hearing normal.      Nose: No rhinorrhea. Mouth/Throat:      Mouth: Mucous membranes are dry. No injury or angioedema. Dentition: Normal dentition. Pharynx: No oropharyngeal exudate. Comments: Breathing through mouth.  Green mucus at soft palate, no purulence or blood noted  Cardiovascular:      Heart sounds: No murmur heard. No friction rub. No gallop. Comments: Heart sounds difficult to appreciate on exam possibly secondary to body habitus and/or positioning. No grossly abnormal heart sounds heard  Musculoskeletal:      Right lower leg: No edema. Left lower leg: No edema. Abdominal: General: Bowel sounds are normal. There is no distension. Palpations: Abdomen is soft. Tenderness: There is no abdominal tenderness. There is no guarding. Constitutional:       General: He is not in acute distress. Pulmonary:      Effort: Pulmonary effort is normal. No accessory muscle usage, respiratory distress or accessory muscle usage. Breath sounds: Normal breath sounds. No stridor. No wheezing, rhonchi or rales. Comments: Satting well on 2L O2 NC, no distress or accessory muscle use  Psychiatric:         Mood and Affect: Mood and affect normal.         Speech: Speech is not no receptive aphasia or no expressive aphasia. Neurological:      Mental Status: He is alert and oriented to person, place and time. He is CAM ICU negative and not agitated. Cranial Nerves: Dysarthria and facial asymmetry (R lower facial droop, b/l eyebrows equal strength, can close eyes) present. Sensory: Sensory deficit (fingertips R hand decreased light touch compared to L hand) present. Comments: NIHSS = 4  1 pt each for: Facial palsy, motor leg, sensory, dysarthria and 5/5 b/l upper extremities, 5/5 LLE, 4/5 RLE. Upper and lower extremity coordination intact, b/l DTRs 1+ and equal b/l            Diagnostic Studies      EKG: Possible prior infarct, RBBB, possible bifascicular block, one of 2 EKGs showed tachycardia to 110s  Imaging: CT head noncontrast this morning with no interval changes to size of intraparenchymal thalamic hemorrhage I have personally reviewed pertinent reports.    and I have personally reviewed pertinent films in PACS     Medications:  Scheduled PRN   insulin lispro, 1-6 Units, Q6H [address]  levothyroxine, 200 mcg, Early Morning  magnesium sulfate, 2 g, Once  metoprolol succinate, 50 mg, Daily  pantoprazole, 40 mg, Daily      albuterol, 2 puff, Q6H PRN  ondansetron, 4 mg, Q6H PRN  pneumococcal 20-concha conj vacc, 0.5 mL, Once PRN       Continuous    niCARdipine, 1-15 mg/hr, Last Rate: Stopped (12/07/23 0024)         Labs:    CBC    Recent Labs     12/06/23 1756 12/07/23 0438   WBC 8.14 12.46*   HGB 14.1 12.7   HCT 45.8 41.0    207     BMP    Recent Labs     12/06/23 1756 12/07/23 0438   SODIUM 139 141   K 4.1 4.1    107   CO2 29 28   AGAP 6 6   BUN 23 24   CREATININE 1.40* 1.25   CALCIUM 9.2 8.8       Coags    Recent Labs     12/06/23 1756   INR 0.98   PTT 32        Additional Electrolytes  Recent Labs     12/06/23 1756 12/07/23 0438   MG 1.9 1.8*          Blood Gas    No recent results  No recent results LFTs  Recent Labs     12/06/23 1756   ALT 16   AST 18   ALKPHOS 55   ALB 4.2   TBILI 0.61       Infectious  Recent Labs     12/06/23 1756   PROCALCITONI <0.05     Glucose  Recent Labs     12/06/23 1756 12/07/23 0438   GLUC 128 122                   Ema CORNELIUS, Family Medicine PGY-2 attest that I have seen and cared for the patient under the supervision of Dr. Florencio De Oliveira.

## 2023-12-07 NOTE — ASSESSMENT & PLAN NOTE
When  patient is able to take p.o. continue valsartan and metoprolol.  Family expected to confirm home medication regimen with the team today

## 2023-12-07 NOTE — PLAN OF CARE
Problem: Potential for Falls  Goal: Patient will remain free of falls  Description: INTERVENTIONS:  - Educate patient/family on patient safety including physical limitations  - Instruct patient to call for assistance with activity   - Consult OT/PT to assist with strengthening/mobility   - Keep Call bell within reach  - Keep bed low and locked with side rails adjusted as appropriate  - Keep care items and personal belongings within reach  - Initiate and maintain comfort rounds  - Make Fall Risk Sign visible to staff  - Offer Toileting every 2 Hours, in advance of need if unable to reposition self  - Initiate/Maintain bed/chair alarm  - Apply yellow socks and bracelet for high fall risk patients  - Consider moving patient to room near nurses station  Outcome: Progressing     Problem: PAIN - ADULT  Goal: Verbalizes/displays adequate comfort level or baseline comfort level  Description: Interventions:  - Encourage patient to monitor pain and request assistance  - Assess pain using appropriate pain scale  - Administer analgesics based on type and severity of pain and evaluate response  - Implement non-pharmacological measures as appropriate and evaluate response  - Consider cultural and social influences on pain and pain management  - Notify physician/advanced practitioner if interventions unsuccessful or patient reports new pain  Outcome: Progressing     Problem: INFECTION - ADULT  Goal: Absence or prevention of progression during hospitalization  Description: INTERVENTIONS:  - Assess and monitor for signs and symptoms of infection  - Monitor lab/diagnostic results  - Monitor all insertion sites, i.e. indwelling lines, tubes, and drains  - Monitor endotracheal if appropriate and nasal secretions for changes in amount and color  - [place] appropriate cooling/warming therapies per order  - Administer medications as ordered  - Instruct and encourage patient and family to use good hand hygiene technique  - Identify and instruct in appropriate isolation precautions for identified infection/condition  Outcome: Progressing     Problem: SAFETY ADULT  Goal: Patient will remain free of falls  Description: INTERVENTIONS:  - Educate patient/family on patient safety including physical limitations  - Instruct patient to call for assistance with activity   - Consult OT/PT to assist with strengthening/mobility   - Keep Call bell within reach  - Keep bed low and locked with side rails adjusted as appropriate  - Keep care items and personal belongings within reach  - Initiate and maintain comfort rounds  - Make Fall Risk Sign visible to staff  - Offer Toileting every 2 Hours, in advance of need if unable to reposition self  - Initiate/Maintain bed/chair alarm  - Apply yellow socks and bracelet for high fall risk patients  - Consider moving patient to room near nurses station  Outcome: Progressing  Goal: Maintain or return to baseline ADL function  Description: INTERVENTIONS:  -  Assess patient's ability to carry out ADLs; assess patient's baseline for ADL function and identify physical deficits which impact ability to perform ADLs (bathing, care of mouth/teeth, toileting, grooming, dressing, etc.)  - Assess/evaluate cause of self-care deficits   - Assess range of motion  - Assess patient's mobility; develop plan if impaired  - Assess patient's need for assistive devices and provide as appropriate  - Encourage maximum independence but intervene and supervise when necessary  - Involve family in performance of ADLs  - Assess for home care needs following discharge   - Consider OT consult to assist with ADL evaluation and planning for discharge  - Provide patient education as appropriate  Outcome: Progressing  Goal: Maintains/Returns to pre admission functional level  Description: INTERVENTIONS:  - Perform AM-PAC 6 Click Basic Mobility/ Daily Activity assessment daily.  - Set and communicate daily mobility goal to care team and patient/family/caregiver. - Collaborate with rehabilitation services on mobility goals if consulted  - Perform Range of Motion 3 times a day. - Reposition patient every 2 hours if pt unable to reposition self  - Record patient progress and toleration of activity level   Outcome: Progressing     Problem: DISCHARGE PLANNING  Goal: Discharge to home or other facility with appropriate resources  Description: INTERVENTIONS:  - Identify barriers to discharge w/patient and caregiver  - Arrange for needed discharge resources and transportation as appropriate  - Identify discharge learning needs (meds, wound care, etc.)  - Arrange for interpretive services to assist at discharge as needed  - Refer to Case Management Department for coordinating discharge planning if the patient needs post-hospital services based on physician/advanced practitioner order or complex needs related to functional status, cognitive ability, or social support system  Outcome: Progressing     Problem: Knowledge Deficit  Goal: Patient/family/caregiver demonstrates understanding of disease process, treatment plan, medications, and discharge instructions  Description: Complete learning assessment and assess knowledge base.   Interventions:  - Provide teaching at level of understanding  - Provide teaching via preferred learning methods  Outcome: Progressing     Problem: NEUROSENSORY - ADULT  Goal: Achieves stable or improved neurological status  Description: INTERVENTIONS  - Monitor and report changes in neurological status  - Monitor vital signs such as temperature, blood pressure, glucose, and any other labs ordered   - Initiate measures to prevent increased intracranial pressure  - Monitor for seizure activity and implement precautions if appropriate      Outcome: Progressing  Goal: Remains free of injury related to seizures activity  Description: INTERVENTIONS  - Maintain airway, patient safety  and administer oxygen as ordered  - Monitor patient for seizure activity, document and report duration and description of seizure to physician/advanced practitioner  - If seizure occurs,  ensure patient safety during seizure  - Reorient patient post seizure  - Seizure pads on all 4 side rails  - Instruct patient/family to notify RN of any seizure activity including if an aura is experienced  - Instruct patient/family to call for assistance with activity based on nursing assessment  - Administer anti-seizure medications if ordered    Outcome: Progressing  Goal: Achieves maximal functionality and self care  Description: INTERVENTIONS  - Monitor swallowing and airway patency with patient fatigue and changes in neurological status  - Encourage and assist patient to increase activity and self care.    - Encourage visually impaired, hearing impaired and aphasic patients to use assistive/communication devices  Outcome: Progressing

## 2023-12-07 NOTE — ASSESSMENT & PLAN NOTE
When patient is able to take p.o. continue valsartan and metoprolol. Family expected to confirm home medication regimen with the team today  Unable to take p.o. Family provided medication he is on valsartan 40 mg p.o. metoprolol 50 mg daily and amlodipine 10 mg daily for blood pressure control  Last night pressures into the 190s to 200 cc 20 mg of IV hydralazine and another 10 mg of IV hydralazine 10 of Lopressor was remaining in the 541 systolic   The patient on a Cardene drip and correlation with Lopressor 5 mg every 6 and 20 mg of IV hydralazine every 6-switch patient back to stepdown level 1 due to being on 15 of Cardene blood pressure 140/70.

## 2023-12-07 NOTE — ASSESSMENT & PLAN NOTE
Patient is a 69-year-old male with medical history pertinent for hypertension diabetes GERD and BPH denting with dizziness increasing weakness and garbled speech. He was treated for bronchitis as outpatient with azithromycin and completed that treatment 2 days ago, documented he also took [place]. He was per family last known well time at 11:00 on 12/5/2023. Morning per his wife he slept all day he appeared very sleepy and with slurred speech, also felt dizzy. Patient son was getting his father ready to get him to the hospital because he refused to go by ambulance. At that time the patient fell onto his because of dizziness. In the ER stroke alert was called-pressure was 191/89 with aspirin yesterday NIH score of 2 and her CT revealed a small acute left basal ganglia and lateral midbrain IPH mild vasogenic edema. Admit to the stepdown 1 critical care service  Neurochecks Q 30 minutes x 2 then every hour for 6 hours   Repeat CT of the head 6 hours after first one.     Keep blood pressure less than 140   Patient takes metoprolol at home at this time n.p.o. added Lopressor 5 mg IV every 8 hours scheduled  Patient received hydralazine 20 mg IV in the ER  Ordered hydralazine 10 mg IV as needed  Also Cardene drip to keep pressures less than 140  Neurology is following  CTA of the head and neck showed no LVO no spot signs and no underlying vascular malformation  Hold DVT prophylaxis for the first 48 hours  Hold antiplatelets and anticoagulation  Sitter outpatient MRI in 8 to 12 weeks

## 2023-12-07 NOTE — ASSESSMENT & PLAN NOTE
Patient is a 49-year-old male with medical history pertinent for hypertension diabetes GERD and BPH denting with dizziness increasing weakness and garbled speech, last well 12/5/23 11AM, refused to go to hospital until he fell 12/6/23 w/o injury. Recently tx w/azithro outpt for bronchitis. ER stroke alert called-pressure was 191/89 with aspirin yesterday NIH score of 2 and her CT revealed a small acute left basal ganglia and lateral midbrain IPH mild vasogenic edema. Repeat CT head 1 & 6h without interval IPH changes.  No LVO spot signs, no vascular malformations  Repeat CT of head prn for new or worsening neuro deficits  BP goal <140/90, s/p IV labetalol & hydralazine, cardene drip stopped, currently on lopressor BID, prn hydralazine 10mg IV  Elevate HOB 30+ degrees  Neurology is following  Hold DVT prophylaxis for the first 48 hours  Hold antiplatelets and anticoagulation  Sitter outpatient MRI in 8 to 12 weeks

## 2023-12-07 NOTE — PROGRESS NOTES
[address] [facility]  Interval Progress Note: Critical Care  Name: Lucrecia Head  MRN: [id number]  Unit/Bed#: -01 I Date of Admission: 12/6/2023   Date of Service: 12/7/2023 I Hospital Day: 1    Interval Events:        Counseled patient about need to control BP through proper medication use outpatient and undergo rehab s/p hospitalization to regain some speech/swallow and motor function. Pertinent New Data:   /63   Pulse 67   Temp 97.9 °F (36.6 °C) (Temporal)   Resp 21   Ht 5' 10" (1.778 m)   Wt 128 kg (281 lb 4.9 oz)   SpO2 93%   BMI 40.36 kg/m²     No gross changes on exam, patient sitting up in chair comfortable with persistent dysarthria, full neurologic exam deferred. Lipid panel, A1c, BMP, Mg, CBC diff, UA with microscopy, BNP, flu/rsv/covid, troponin, lactic acid, procalcitonin, APTT PT/INR  chest CT scan, abdominal/pelvic CT, and CT headI have personally reviewed pertinent reports. Assessment and Plan  Diagnosis: Intraparenchymal hemorrhage  Plan: Transfer to Med-surg service under hospitalist service. Discussed with Dr. Allie Solares who is agreeable. We have spoken with neurology who recommends MRI brain with & without contrast to rule out underlying structural characteristics of brain which may have predisposed to hemorrhage. Ordered prior to transfer.       SIGNATURE: Joseph Rosario MD

## 2023-12-07 NOTE — ASSESSMENT & PLAN NOTE
Lab Results   Component Value Date    EGFR 48 12/10/2023    EGFR 50 12/09/2023    EGFR 49 12/08/2023    CREATININE 1.32 (H) 12/10/2023    CREATININE 1.28 12/09/2023    CREATININE 1.31 (H) 12/08/2023     His baseline creatinine is 1.4-1.5 at baseline  Avoid nephrotoxic medications, nsaids, hypotension  Monitor I&Os

## 2023-12-07 NOTE — ASSESSMENT & PLAN NOTE
Patient is a 78-year-old male with medical history pertinent for hypertension diabetes GERD and BPH presenting with dizziness, increasing weakness and garbled speech. Last seen well 12/5/23 11AM, refused to go to hospital until he fell 12/6/23 w/o injury. Recently tx w/azithro outpt for bronchitis. ER stroke alert called-pressure was 191/89 with aspirin yesterday NIH score of 2 and her CT revealed a small acute left basal ganglia and lateral midbrain IPH mild vasogenic edema. Repeat CT head 1 & 6h without interval IPH changes.  No LVO spot signs, no vascular malformations  Repeat CT of head prn for new or worsening neuro deficits  BP goal <140/90, s/p IV labetalol & hydralazine, cardene drip stopped, currently on lopressor BID, prn hydralazine 10mg IV  Elevate HOB 30+ degrees  Neurology is following  Hold DVT prophylaxis for the first 48 hours  Hold antiplatelets and anticoagulation  Consider outpatient MRI in 8 to 12 weeks

## 2023-12-07 NOTE — OCCUPATIONAL THERAPY NOTE
Occupational Therapy Evaluation     Patient Name: Yue BELLAMY Date: 12/7/2023  Problem List  Principal Problem:    Intraparenchymal hemorrhage of brain ([address])  Active Problems:    Diabetes mellitus ([address])    Hypertension    Class 3 severe obesity with body mass index (BMI) of 40.0 to 44.9 in adult [facility])    Benign prostatic hyperplasia without urinary obstruction    Stage 3a chronic kidney disease (HCC)    Tachypnea    Past Medical History  Past Medical History:   Diagnosis Date    Diabetes mellitus ([address])     GERD (gastroesophageal reflux disease)     Hypertension      Past Surgical History  Past Surgical History:   Procedure Laterality Date    HERNIA REPAIR          12/07/23 0949   Note Type   Note type Evaluation   Pain Assessment   Pain Assessment Tool 0-10   Pain Score No Pain   Restrictions/Precautions   Other Precautions Chair Alarm; Bed Alarm;Multiple lines;Telemetry; Fall Risk;O2  (1 L O2 NC)   Home Living   Type of Home House  (4 WARREN R HR)   Home Layout Multi-level;Bed/bath upstairs  (FF to bedroom B HR)   Bathroom Shower/Tub Tub/shower unit   Bathroom Toilet Standard   Bathroom Equipment Commode; Shower chair;Grab bars in shower   Home Equipment Walker;Cane   Additional Comments Pt reports living in a [facility] with 4 WARREN R HR. Pt reports not using AD for functional mobility PTA. Prior Function   Level of [place] Independent with ADLs; Independent with functional mobility; Independent with IADLS   Lives With Spouse   Receives Help From Family   IADLs Independent with medication management; Independent with driving;Family/Friend/Other provides meals   Falls in the last 6 months 1 to 4   Comments PTA, pt reports independence with ADLs, IADLs, and functional mobility without AD. ADL   UB Dressing Assistance 5  Supervision/Setup   LB Dressing Assistance 1  Total Assistance   LB Dressing Deficit Don/doff R sock; Don/doff L sock   Additional Comments Pt requiring total assist to don B socks while seated EOB. Pt reporting he typically does not wear socks at home. Other LB ADLs overall at min/mod assist. UB ADLs can be completed with setup and cueing. Bed Mobility   Supine to Sit 3  Moderate assistance   Additional items Assist x 1; Increased time required;Verbal cues;LE management; Other;Comment  (Trunk management)   Additional Comments Pt received supine in bed upon start of session. Pt supine > sit EOB with HOB flat without bedrail, with mod assist x 1 for LE management and trunk management. Transfers   Sit to Stand   (Steadying assist)   Additional items Assist x 1; Increased time required;Verbal cues   Stand to Sit 4  Minimal assistance   Additional items Assist x 1; Increased time required;Verbal cues   Stand pivot 3  Moderate assistance   Additional items Assist x 1; Increased time required;Verbal cues   Additional Comments All functional transfers without AD. Pt sit > stand from EOB with steadying assist. Wide LUPE noted. Pt spt in room with mod assist x 1. Pt stand > sit with min assist x 1 for controlled descent. Pt requiring VCs for safe transfer techniques and for turning completely prior to sitting. Functional Mobility   Functional Mobility 3  Moderate assistance   Additional Comments Pt participated in short functional distance in room with mod assist x 1 and no AD. Balance   Static Sitting Good   Dynamic Sitting Fair +   Static Standing Fair -   Dynamic Standing Poor +   Activity Tolerance   Activity Tolerance Patient limited by fatigue   Medical Staff Made Aware PTKrish   Nurse Made Aware RN, Saray   RUE Assessment   RUE Assessment WFL  (Strength grossly 4+/5)   LUE Assessment   LUE Assessment WFL  (Strength grossly 4+/5)   Hand Function   Gross Motor Coordination Functional   Fine Motor Coordination   (R impaired, L functional)   Hand Function Comments Pt is R hand dominant.    Sensation   Light Touch No apparent deficits   Proprioception   Proprioception   (Finger to nose test revealing mild impairments with R as compared to L)   Vision - Complex Assessment   Ocular Range of Motion Intact   Tracking Intact   Cognition   Overall Cognitive Status [facility]   Arousal/Participation Alert; Cooperative   Attention Within functional limits   Orientation Level Oriented X4   Memory Unable to assess   Following Commands Follows one step commands without difficulty   Comments Pt with garbled/slurred speech, difficult to understand at times. Assessment   Limitation Decreased ADL status; Decreased Safe judgement during ADL;Decreased endurance;Decreased fine motor control;Decreased self-care trans;Decreased high-level ADLs   Assessment Pt is a 78 y.o. male, admitted to [address] [place] [address] 12/6/2023 d/t experiencing weakness. Dx: Intraparenchymal hemorrhage of brain. Pt with PMHx impacting their performance during ADL tasks, including: CKD 3a, BPH without urinary obstruction, severe obesity, HTN, DM. Prior to admission to the hospital Pt was performing ADLs without physical assistance. IADLs without physical assistance. Functional transfers/ambulation without physical assistance. Cognitive status PTA was [facility]. OT order placed to assess Pt's ADLs, cognitive status, and performance during functional tasks in order to maximize safety and independence while making most appropriate d/c recommendations. PT/OT co-evaluation completed at this time d/t significant mobility deficits and safety concerns.  Pt's clinical presentation is currently unstable/unpredictable given new onset deficits that affect Pt's occupational performance and ability to safely return to OF including decrease activity tolerance, decrease standing balance, decrease performance during ADL tasks, decrease activity engagement, decrease performance during functional transfers, decrease FM control, steps to enter home, limited family support, high fall risk, and limited insight to deficits combined with medical complications of abnormal renal lab values, abnormal CBC, new onset O2 use, decreased skin integrity, and need for input for mobility technique/safety. Personal factors affecting Pt at time of initial evaluation include: step(s) to enter environment, multi-level environment, limited home support, advanced age, inability to perform IADLs, inability to perform ADLs, new need for AD, inability to ambulate household distances, inability to navigate community distances, limited insight into impairments, decreased initiation and engagement, difficulty communicating, and recent fall(s)/fall history. Pt will benefit from continued skilled OT services to address deficits as defined above and to maximize level independence/participation during ADLs and functional tasks to facilitate return toward PLOF and improved quality of life. From an occupational therapy standpoint, Level I (Maximum Resource Intensity) is recommended upon d/c.   Plan   Treatment Interventions ADL retraining;Functional transfer training; Endurance training;Patient/family training;Equipment evaluation/education; Neuromuscular reeducation; Fine motor coordination activities; Compensatory technique education;Continued evaluation; Energy conservation; Activityengagement   Goal Expiration Date 12/21/23   OT Treatment Day 1   OT Frequency 3-5x/wk   Discharge Recommendation   Rehab Resource Intensity Level, OT I (Maximum Resource Intensity)   AM-PAC Daily Activity Inpatient   Lower Body Dressing 2   Bathing 2   Toileting 2   Upper Body Dressing 3   Grooming 3   Eating 4   Daily Activity Raw Score 16   Daily Activity Standardized Score (Calc for Raw Score >=11) 35.96   AM-PAC Applied Cognition Inpatient   Following a Speech/Presentation 3   Understanding Ordinary Conversation 4   Taking Medications 3   Remembering Where Things Are Placed or Put Away 3   Remembering List of 4-5 Errands 3   Taking Care of Complicated Tasks 3   Applied Cognition Raw Score 19   Applied Cognition Standardized Score 39.77   Additional Treatment Session Start Time 1025   End Time 1040   Treatment Assessment Pt participated in tx session focused on toileting and functional mobility. PT/OT co-treat completed due to safety concerns and mobility deficits. Pt alert and agreeable to participate. Pt participated in functional transfer to standard toilet with mod assist x 1. Pt urinated while on toilet. Pt given assistance for perineal hygiene for thoroughness. Pt able to don briefs with min assist. Pt participated in short functional distance in room with min/mod assist x 1 and no AD. Pt tolerated treatment well but is limited by weakness of RLE and fine motor deficits of RUE. Pt received on 1 L O2 during evaluation, was trialled on RA as pt normally does not wear O2 at home, and pt spO2 reading in 90s throughout evaluation and treatment without c/o SOB. At end of session, pt seated in chair with chair alarm on, call bell in reach, and all needs met. The patient's raw score on the AM-PAC Daily Activity Inpatient Short Form is 16. A raw score of less than 19 suggests the patient may benefit from discharge to post-acute rehabilitation services. Please refer to the recommendation of the Occupational Therapist for safe discharge planning. Pt goals to be met by 12/21/2023:    Pt will demonstrate ability to complete grooming/hygiene tasks @ mod I after set-up. Pt will demonstrate ability to complete supine<>sit @ mod I in order to increase safety and independence during ADL tasks. Pt will demonstrate ability to complete UB ADLs including washing/dressing @ mod I in order to increase performance and participation during meaningful tasks  Pt will demonstrate ability to complete LB dressing @ mod I in order to increase safety and independence during meaningful tasks. Pt will demonstrate ability to mane/doff socks/shoes while sitting EOB/chair @ mod I in order to increase safety and independence during meaningful tasks.    Pt will demonstrate ability to complete toileting tasks including CM and pericare @ mod I in order to increase safety and independence during meaningful tasks. Pt will demonstrate ability to complete EOB, chair, toilet/commode transfers @ mod I in order to increase performance and participation during functional tasks. Pt will demonstrate ability to stand for 10 minutes while maintaining F+ balance with use of RW for UB support PRN. Pt will demonstrate ability to tolerate 30 minute OT session with no vc'ing for deep breathing or use of energy conservation techniques in order to increase activity tolerance during functional tasks. Pt will demonstrate Good carryover of use of energy conservation/compensatory strategies during ADLs and functional tasks in order to increase safety and reduce risk for falls. Pt will demonstrate Good attention and participation in continued evaluation of functional ambulation house hold distances in order to assist with safe d/c planning. Pt will attend to continued cognitive assessments 100% of the time in order to provide most appropriate d/c recommendations. Pt will follow 100% simple 2-step commands and be A&O x4 consistently with environmental cues to increase participation in functional activities. Pt will identify 3 areas of interest/hobbies and 1 intervention on how to incorporate into daily life in order to increase interaction with environment and peers as well as increase participation in meaningful tasks. Pt will demonstrate 100% carryover of BUE HEP in order to increase BUE MS and increase performance during functional tasks upon d/c home.     [employer] MS, OTR/L

## 2023-12-07 NOTE — SEPSIS NOTE
Sepsis Note   Lori Nageotte 78 y.o. male MRN: [id number]  Unit/Bed#: -01 Encounter: [id number]       Initial Sepsis Screening       Row Name 12/07/23 0040                Is the patient's history suggestive of a new or worsening infection? No  -HO                  User Key  (r) = Recorded By, (t) = Taken By, (c) = Cosigned By      [address] [place] [address] Name Provider Type    CAROLYN Castanon PA-C Physician Assistant                    Pt is here for stroke alert with a small IPH, no signs of infections     Body mass index is 41 kg/m².   Wt Readings from Last 1 Encounters:   12/06/23 130 kg (285 lb 11.5 oz)     IBW (Ideal Body Weight): 73 kg    Ideal body weight: 73 kg (160 lb 15 oz)  Adjusted ideal body weight: 95.6 kg (210 lb 13.6 oz)

## 2023-12-08 ENCOUNTER — APPOINTMENT (INPATIENT)
Dept: RADIOLOGY | Facility: HOSPITAL | Age: 83
End: 2023-12-08
Payer: COMMERCIAL

## 2023-12-08 PROBLEM — R13.10 DYSPHAGIA: Status: ACTIVE | Noted: 2023-12-08

## 2023-12-08 LAB
ANION GAP SERPL CALCULATED.3IONS-SCNC: 7 MMOL/L
BASOPHILS # BLD AUTO: 0.06 THOUSANDS/ÂΜL (ref 0–0.1)
BASOPHILS NFR BLD AUTO: 1 % (ref 0–1)
BUN SERPL-MCNC: 21 MG/DL (ref 5–25)
CALCIUM SERPL-MCNC: 9.3 MG/DL (ref 8.4–10.2)
CHLORIDE SERPL-SCNC: 106 MMOL/L (ref 96–108)
CO2 SERPL-SCNC: 27 MMOL/L (ref 21–32)
CREAT SERPL-MCNC: 1.31 MG/DL (ref 0.6–1.3)
EOSINOPHIL # BLD AUTO: 0.12 THOUSAND/ÂΜL (ref 0–0.61)
EOSINOPHIL NFR BLD AUTO: 1 % (ref 0–6)
ERYTHROCYTE [DISTWIDTH] IN BLOOD BY AUTOMATED COUNT: 15.8 % (ref 11.6–15.1)
GFR SERPL CREATININE-BSD FRML MDRD: 49 ML/MIN/1.73SQ M
GLUCOSE SERPL-MCNC: 113 MG/DL (ref 65–140)
GLUCOSE SERPL-MCNC: 115 MG/DL (ref 65–140)
GLUCOSE SERPL-MCNC: 127 MG/DL (ref 65–140)
GLUCOSE SERPL-MCNC: 169 MG/DL (ref 65–140)
GLUCOSE SERPL-MCNC: 92 MG/DL (ref 65–140)
HCT VFR BLD AUTO: 45.6 % (ref 36.5–49.3)
HGB BLD-MCNC: 14 G/DL (ref 12–17)
IMM GRANULOCYTES # BLD AUTO: 0.03 THOUSAND/UL (ref 0–0.2)
IMM GRANULOCYTES NFR BLD AUTO: 0 % (ref 0–2)
LYMPHOCYTES # BLD AUTO: 2.22 THOUSANDS/ÂΜL (ref 0.6–4.47)
LYMPHOCYTES NFR BLD AUTO: 21 % (ref 14–44)
MAGNESIUM SERPL-MCNC: 2.2 MG/DL (ref 1.9–2.7)
MCH RBC QN AUTO: 26.6 PG (ref 26.8–34.3)
MCHC RBC AUTO-ENTMCNC: 30.7 G/DL (ref 31.4–37.4)
MCV RBC AUTO: 87 FL (ref 82–98)
MONOCYTES # BLD AUTO: 0.79 THOUSAND/ÂΜL (ref 0.17–1.22)
MONOCYTES NFR BLD AUTO: 8 % (ref 4–12)
NEUTROPHILS # BLD AUTO: 7.15 THOUSANDS/ÂΜL (ref 1.85–7.62)
NEUTS SEG NFR BLD AUTO: 69 % (ref 43–75)
NRBC BLD AUTO-RTO: 0 /100 WBCS
PHOSPHATE SERPL-MCNC: 3.1 MG/DL (ref 2.3–4.1)
PLATELET # BLD AUTO: 212 THOUSANDS/UL (ref 149–390)
PMV BLD AUTO: 9.8 FL (ref 8.9–12.7)
POTASSIUM SERPL-SCNC: 4.2 MMOL/L (ref 3.5–5.3)
RBC # BLD AUTO: 5.26 MILLION/UL (ref 3.88–5.62)
SODIUM SERPL-SCNC: 140 MMOL/L (ref 135–147)
WBC # BLD AUTO: 10.37 THOUSAND/UL (ref 4.31–10.16)

## 2023-12-08 PROCEDURE — 92523 SPEECH SOUND LANG COMPREHEN: CPT

## 2023-12-08 PROCEDURE — 94668 MNPJ CHEST WALL SBSQ: CPT

## 2023-12-08 PROCEDURE — 80048 BASIC METABOLIC PNL TOTAL CA: CPT | Performed by: PHYSICIAN ASSISTANT

## 2023-12-08 PROCEDURE — 94660 CPAP INITIATION&MGMT: CPT

## 2023-12-08 PROCEDURE — 82948 REAGENT STRIP/BLOOD GLUCOSE: CPT

## 2023-12-08 PROCEDURE — 74230 X-RAY XM SWLNG FUNCJ C+: CPT

## 2023-12-08 PROCEDURE — 87040 BLOOD CULTURE FOR BACTERIA: CPT | Performed by: PHYSICIAN ASSISTANT

## 2023-12-08 PROCEDURE — 92611 MOTION FLUOROSCOPY/SWALLOW: CPT

## 2023-12-08 PROCEDURE — 83735 ASSAY OF MAGNESIUM: CPT | Performed by: PHYSICIAN ASSISTANT

## 2023-12-08 PROCEDURE — 84100 ASSAY OF PHOSPHORUS: CPT | Performed by: PHYSICIAN ASSISTANT

## 2023-12-08 PROCEDURE — 92526 ORAL FUNCTION THERAPY: CPT

## 2023-12-08 PROCEDURE — 94760 N-INVAS EAR/PLS OXIMETRY 1: CPT

## 2023-12-08 PROCEDURE — 85025 COMPLETE CBC W/AUTO DIFF WBC: CPT | Performed by: PHYSICIAN ASSISTANT

## 2023-12-08 PROCEDURE — C9113 INJ PANTOPRAZOLE SODIUM, VIA: HCPCS

## 2023-12-08 PROCEDURE — 87154 CUL TYP ID BLD PTHGN 6+ TRGT: CPT | Performed by: PHYSICIAN ASSISTANT

## 2023-12-08 PROCEDURE — 99233 SBSQ HOSP IP/OBS HIGH 50: CPT | Performed by: STUDENT IN AN ORGANIZED HEALTH CARE EDUCATION/TRAINING PROGRAM

## 2023-12-08 RX ORDER — METOPROLOL TARTRATE 1 MG/ML
5 INJECTION, SOLUTION INTRAVENOUS ONCE
Status: COMPLETED | OUTPATIENT
Start: 2023-12-08 | End: 2023-12-08

## 2023-12-08 RX ORDER — BISACODYL 10 MG
10 SUPPOSITORY, RECTAL RECTAL AS NEEDED
Status: DISCONTINUED | OUTPATIENT
Start: 2023-12-08 | End: 2023-12-14 | Stop reason: HOSPADM

## 2023-12-08 RX ORDER — LISINOPRIL 10 MG/1
10 TABLET ORAL DAILY
Status: DISCONTINUED | OUTPATIENT
Start: 2023-12-08 | End: 2023-12-08

## 2023-12-08 RX ORDER — AMLODIPINE BESYLATE 5 MG/1
5 TABLET ORAL DAILY
Status: DISCONTINUED | OUTPATIENT
Start: 2023-12-08 | End: 2023-12-08

## 2023-12-08 RX ORDER — METOPROLOL SUCCINATE 50 MG/1
50 TABLET, EXTENDED RELEASE ORAL DAILY
Status: DISCONTINUED | OUTPATIENT
Start: 2023-12-08 | End: 2023-12-09

## 2023-12-08 RX ORDER — INSULIN LISPRO 100 [IU]/ML
1-6 INJECTION, SOLUTION INTRAVENOUS; SUBCUTANEOUS
Status: DISCONTINUED | OUTPATIENT
Start: 2023-12-09 | End: 2023-12-14 | Stop reason: HOSPADM

## 2023-12-08 RX ORDER — LOSARTAN POTASSIUM 25 MG/1
25 TABLET ORAL DAILY
Status: DISCONTINUED | OUTPATIENT
Start: 2023-12-08 | End: 2023-12-10

## 2023-12-08 RX ORDER — LIDOCAINE 50 MG/G
2 PATCH TOPICAL EVERY 24 HOURS
Status: DISCONTINUED | OUTPATIENT
Start: 2023-12-08 | End: 2023-12-14 | Stop reason: HOSPADM

## 2023-12-08 RX ORDER — AMLODIPINE BESYLATE 10 MG/1
10 TABLET ORAL DAILY
Status: DISCONTINUED | OUTPATIENT
Start: 2023-12-09 | End: 2023-12-14 | Stop reason: HOSPADM

## 2023-12-08 RX ORDER — HYDRALAZINE HYDROCHLORIDE 20 MG/ML
10 INJECTION INTRAMUSCULAR; INTRAVENOUS EVERY 4 HOURS PRN
Status: DISCONTINUED | OUTPATIENT
Start: 2023-12-08 | End: 2023-12-13

## 2023-12-08 RX ORDER — LOSARTAN POTASSIUM 25 MG/1
25 TABLET ORAL DAILY
Status: DISCONTINUED | OUTPATIENT
Start: 2023-12-08 | End: 2023-12-08

## 2023-12-08 RX ADMIN — SODIUM CHLORIDE 15 MG/HR: 0.9 INJECTION, SOLUTION INTRAVENOUS at 05:00

## 2023-12-08 RX ADMIN — METOROPROLOL TARTRATE 5 MG: 5 INJECTION, SOLUTION INTRAVENOUS at 07:55

## 2023-12-08 RX ADMIN — SODIUM CHLORIDE 15 MG/HR: 0.9 INJECTION, SOLUTION INTRAVENOUS at 12:27

## 2023-12-08 RX ADMIN — HYDRALAZINE HYDROCHLORIDE 20 MG: 20 INJECTION INTRAMUSCULAR; INTRAVENOUS at 13:06

## 2023-12-08 RX ADMIN — HYDRALAZINE HYDROCHLORIDE 10 MG: 20 INJECTION INTRAMUSCULAR; INTRAVENOUS at 23:31

## 2023-12-08 RX ADMIN — AMLODIPINE BESYLATE 5 MG: 5 TABLET ORAL at 15:54

## 2023-12-08 RX ADMIN — LOSARTAN POTASSIUM 25 MG: 25 TABLET, FILM COATED ORAL at 18:21

## 2023-12-08 RX ADMIN — SODIUM CHLORIDE 12.5 MG/HR: 0.9 INJECTION, SOLUTION INTRAVENOUS at 21:53

## 2023-12-08 RX ADMIN — PANTOPRAZOLE SODIUM 40 MG: 40 INJECTION, POWDER, FOR SOLUTION INTRAVENOUS at 06:29

## 2023-12-08 RX ADMIN — METOROPROLOL TARTRATE 5 MG: 5 INJECTION, SOLUTION INTRAVENOUS at 10:39

## 2023-12-08 RX ADMIN — SODIUM CHLORIDE 15 MG/HR: 0.9 INJECTION, SOLUTION INTRAVENOUS at 07:45

## 2023-12-08 RX ADMIN — HYDRALAZINE HYDROCHLORIDE 20 MG: 20 INJECTION INTRAMUSCULAR; INTRAVENOUS at 06:29

## 2023-12-08 RX ADMIN — METOROPROLOL TARTRATE 5 MG: 5 INJECTION, SOLUTION INTRAVENOUS at 01:53

## 2023-12-08 RX ADMIN — SODIUM CHLORIDE 5 MG/HR: 0.9 INJECTION, SOLUTION INTRAVENOUS at 00:23

## 2023-12-08 RX ADMIN — METOPROLOL SUCCINATE 50 MG: 50 TABLET, EXTENDED RELEASE ORAL at 18:21

## 2023-12-08 RX ADMIN — SODIUM CHLORIDE 15 MG/HR: 0.9 INJECTION, SOLUTION INTRAVENOUS at 10:17

## 2023-12-08 RX ADMIN — SODIUM CHLORIDE 15 MG/HR: 0.9 INJECTION, SOLUTION INTRAVENOUS at 03:06

## 2023-12-08 RX ADMIN — HYDRALAZINE HYDROCHLORIDE 10 MG: 20 INJECTION INTRAMUSCULAR; INTRAVENOUS at 09:22

## 2023-12-08 RX ADMIN — SODIUM CHLORIDE 15 MG/HR: 0.9 INJECTION, SOLUTION INTRAVENOUS at 16:53

## 2023-12-08 RX ADMIN — SODIUM CHLORIDE 15 MG/HR: 0.9 INJECTION, SOLUTION INTRAVENOUS at 18:26

## 2023-12-08 RX ADMIN — LIDOCAINE 5% 2 PATCH: 700 PATCH TOPICAL at 20:16

## 2023-12-08 RX ADMIN — LISINOPRIL 10 MG: 10 TABLET ORAL at 15:54

## 2023-12-08 NOTE — ASSESSMENT & PLAN NOTE
Tachypnea into 20's-30's on arrival, sats >92% on room air currently.  Sat goal >94% to ensure optimal brain oxygenation  Hx BASILIA, CPAP qhs

## 2023-12-08 NOTE — OCCUPATIONAL THERAPY NOTE
Occupational Therapy Cancel Note     Patient Name: Madhu Zuniga  TBESN'A Date: 12/8/2023  Problem List  Principal Problem:    Intraparenchymal hemorrhage of brain [facility])  Active Problems:    Diabetes mellitus ([address] [facility])    Hypertension    Class 3 severe obesity with body mass index (BMI) of 40.0 to 44.9 in adult [facility])    Benign prostatic hyperplasia without urinary obstruction    Stage 3a chronic kidney disease ([address] [facility])    Tachypnea       12/08/23 1040   Note Type   Note Type Cancelled Session   Cancel Reasons Medical status     Chart reviewed. Spoke with RN, Andres Bishop. Patient's BP elevated to 171/71 with HOB elevated > 30 degrees and 160/71 with HOB lowered. BP greater than goal of <140/90. Hold OT services at this time. Will continue to follow case and address as medically appropriate and as time allows.     Mckenna Noble, NATASHAR/L

## 2023-12-08 NOTE — ASSESSMENT & PLAN NOTE
Lab Results   Component Value Date    EGFR 49 12/08/2023    EGFR 52 12/07/2023    EGFR 45 12/06/2023    CREATININE 1.31 (H) 12/08/2023    CREATININE 1.25 12/07/2023    CREATININE 1.40 (H) 12/06/2023     His baseline creatinine is 1.4-1.5  Trend creatinine

## 2023-12-08 NOTE — QUICK NOTE
Called son who identified himself.  Discussed patient's ongoing plan of care and answered his questions.  -Bhaskar Meeks MD

## 2023-12-08 NOTE — PROGRESS NOTES
[address] [facility]  Progress Note  Name: Narcisa Lerma  MRN: [id number]  Unit/Bed#: -01 I Date of Admission: 12/6/2023   Date of Service: 12/8/2023 I Hospital Day: 2    Assessment/Plan   * Intraparenchymal hemorrhage of brain [facility])  Assessment & Plan  Patient is a 23-year-old male with medical history pertinent for hypertension diabetes GERD and BPH presenting with dizziness, increasing weakness and garbled speech. Last seen well 12/5/23 11AM, refused to go to hospital until he fell 12/6/23 w/o injury. Recently tx w/azithro outpt for bronchitis. ER stroke alert called-pressure was 191/89 with aspirin yesterday NIH score of 2 and her CT revealed a small acute left basal ganglia and lateral midbrain IPH mild vasogenic edema. Repeat CT head 1 & 6h without interval IPH changes. No LVO spot signs, no vascular malformations  Repeat CT of head prn for new or worsening neuro deficits  BP goal <140/90, s/p IV labetalol & hydralazine, cardene drip stopped, currently on lopressor BID, prn hydralazine 10mg IV  Elevate HOB 30+ degrees  Neurology is following  Hold DVT prophylaxis for the first 48 hours, restart 12/9 if no new bleed  Hold antiplatelets and anticoagulation  Consider outpatient MRI in 8 to 12 weeks      Hypertension  Assessment & Plan  When patient is able to take p.o. continue valsartan and metoprolol. Unable to take p.o. Family provided medication he is on valsartan 40 mg p.o. metoprolol 50 mg daily and amlodipine 10 mg daily for blood pressure control  Last night pressures into the 190s to 200 cc 20 mg of IV hydralazine and another 10 mg of IV hydralazine 10 of Lopressor was remaining in the 761 systolic   The patient on a Cardene drip and correlation with Lopressor 5 mg every 6 and 20 mg of IV hydralazine every 6-switch patient back to stepdown level 1 due to being on 15 of Cardene blood pressure 140/70.        Tachypnea  Assessment & Plan  Tachypnea into 20's-30's on arrival, sats >92% on room air currently. Sat goal >94% to ensure optimal brain oxygenation  Hx BASILIA, CPAP qhs    Stage 3a chronic kidney disease [facility])  Assessment & Plan  Lab Results   Component Value Date    EGFR 49 12/08/2023    EGFR 52 12/07/2023    EGFR 45 12/06/2023    CREATININE 1.31 (H) 12/08/2023    CREATININE 1.25 12/07/2023    CREATININE 1.40 (H) 12/06/2023     His baseline creatinine is 1.4-1.5  Trend creatinine    Benign prostatic hyperplasia without urinary obstruction  Assessment & Plan  Continue Flomax 0.4 mg p.o. Class 3 severe obesity with body mass index (BMI) of 40.0 to 44.9 in adult [facility])  Assessment & Plan  Dietary and lifestyle counseling recommended prior to discharge    Diabetes mellitus [facility])  Assessment & Plan  Lab Results   Component Value Date    HGBA1C 6.3 (H) 12/07/2023       Recent Labs     12/07/23  1147 12/07/23  1736 12/08/23  0052 12/08/23  0605   POCGLU 114 107 92 113         Blood Sugar Average: Last 72 hrs:  (P) 113.5    Insulin sliding scale                Disposition: Stepdown Level 1    ICU Core Measures     A: Assess, Prevent, and Manage Pain Has pain been assessed? Yes  Need for changes to pain regimen? No   B: Both SAT/SAT  N/A   C: Choice of Sedation RASS Goal: 0 Alert and Calm  Need for changes to sedation or analgesia regimen? No   D: Delirium CAM-ICU: Negative   E: Early Mobility  Plan for early mobility? Yes   F: Family Engagement Plan for family engagement today? Yes         Prophylaxis:  VTE Contraindicated secondary to: intracerebral hemorrhage   Stress Ulcer  covered bypantoprazole (PROTONIX) injection 40 mg [[id number]]         Significant 24hr Events     24hr events: Returned to [place] [address] due to increased BP's which could not be controlled with 30mg hydralazine & 10mg metoprolol tartrate alone, required nicardipine drip once more. Subjective   Review of Systems   Constitutional:  Negative for chills and fever. HENT:  Negative for ear pain and sore throat. Eyes:  Negative for pain and visual disturbance. Respiratory:  Negative for cough and shortness of breath. Cardiovascular:  Negative for chest pain and palpitations. Gastrointestinal:  Negative for abdominal pain, constipation, diarrhea, nausea and vomiting. Genitourinary:  Negative for dysuria and hematuria. Musculoskeletal:  Negative for arthralgias and back pain. Skin:  Negative for color change and rash. Neurological:  Positive for speech difficulty and weakness. Negative for seizures, syncope and numbness. Psychiatric/Behavioral:  Negative for agitation and confusion. All other systems reviewed and are negative. Objective                            Vitals I/O      Most Recent Min/Max in 24hrs   Temp 98.2 °F (36.8 °C) Temp  Min: 97.4 °F (36.3 °C)  Max: 98.2 °F (36.8 °C)   Pulse 88 Pulse  Min: 64  Max: 88   Resp 22 Resp  Min: 14  Max: 29   BP (!) 190/80 BP  Min: 115/56  Max: 191/83   O2 Sat 93 % SpO2  Min: 92 %  Max: 97 %      Intake/Output Summary (Last 24 hours) at 12/8/2023 0803  Last data filed at 12/8/2023 0300  Gross per 24 hour   Intake 356.25 ml   Output 1214 ml   Net -857.75 ml       Diet NPO    Invasive Monitoring   N/A        Physical Exam   Physical Exam  Vitals reviewed. Eyes:      General: No scleral icterus. Right eye: No discharge. Left eye: No discharge. Conjunctiva/sclera: Conjunctivae normal.   HENT:      Nose: No rhinorrhea. Cardiovascular:      Rate and Rhythm: Normal rate and regular rhythm. Pulses: Normal pulses. Heart sounds: No murmur heard. No friction rub. No gallop. Musculoskeletal:      Right lower leg: No edema. Left lower leg: No edema. Abdominal: General: Bowel sounds are normal. There is no distension. Palpations: Abdomen is soft. Tenderness: There is no abdominal tenderness. There is no guarding. Constitutional:       General: He is not in acute distress. Appearance: He is not ill-appearing. Pulmonary:      Effort: Pulmonary effort is normal. No accessory muscle usage, respiratory distress or accessory muscle usage. Breath sounds: Normal breath sounds. No stridor. No wheezing or rhonchi. Neurological:      Mental Status: He is not agitated. Comments: R lower facial droops. CNII-XII intact, RLE strength improved compared to yesterday, no sensory deficits to light touch noted in b/l upper & lower extremities. DAVIS = 0, CAM-ICU = negative, NIHSS = 2 (1 for facial palsy, 1 for dysarthria)            Diagnostic Studies      EKG: no new  Imaging: MRI head/brain with stable hemorrhagic lesion and no solid lesion predisposing to bleed I have personally reviewed pertinent reports.        Medications:  Scheduled PRN   hydrALAZINE, 20 mg, Q6H [address]  insulin lispro, 1-6 Units, Q6H [address]  levothyroxine, 200 mcg, Early Morning  metoprolol, 5 mg, Q6H  pantoprazole, 40 mg, QAM      albuterol, 2 puff, Q6H PRN  hydrALAZINE, 10 mg, Q6H PRN  ondansetron, 4 mg, Q6H PRN  pneumococcal 20-concha conj vacc, 0.5 mL, Once PRN       Continuous    niCARdipine, 1-15 mg/hr, Last Rate: 15 mg/hr (12/08/23 0745)         Labs:    CBC    Recent Labs     12/07/23 0438 12/08/23  0601   WBC 12.46* 10.37*   HGB 12.7 14.0   HCT 41.0 45.6    212     BMP    Recent Labs     12/07/23 0438 12/08/23  0601   SODIUM 141 140   K 4.1 4.2    106   CO2 28 27   AGAP 6 7   BUN 24 21   CREATININE 1.25 1.31*   CALCIUM 8.8 9.3       Coags    Recent Labs     12/06/23  1756   INR 0.98   PTT 32        Additional Electrolytes  Recent Labs     12/07/23  0438 12/08/23  0601   MG 1.8* 2.2   PHOS  --  3.1          Blood Gas    No recent results  No recent results LFTs  Recent Labs     12/06/23  1756   ALT 16   AST 18   ALKPHOS 55   ALB 4.2   TBILI 0.61       Infectious  Recent Labs     12/06/23  1756   PROCALCITONI <0.05     Glucose  Recent Labs     12/06/23  1756 12/07/23  0438 12/08/23  0601   GLUC  August Mckoen [place],[address], MD Home

## 2023-12-08 NOTE — UTILIZATION REVIEW
Date: 12/8/23     Day 3: Has surpassed a 2nd midnight with active treatments and services, which include cont  hydralazine iv Q6; Accuchecks with ssic; cont lopressor iv Q6; cont protonix iv; monitor labs; cont cardene gtt. See initial review completed by Bg Bruno on 12/7/23.      Vital Signs:   Date/Time Temp Pulse Resp BP MAP (mmHg) SpO2 Calculated FIO2 (%) - Nasal Cannula Nasal Cannula O2 Flow Rate (L/min) O2 Device O2 Interface Device Patient Position - Orthostatic VS   12/08/23 0745 -- 88 22 190/80 Abnormal  115 93 % -- -- -- -- --   12/08/23 0700 -- 83 20 167/72 104 93 % -- -- -- -- --   12/08/23 0645 -- 84 21 152/68 98 94 % -- -- -- -- --   12/08/23 0636 -- -- -- 171/70 Abnormal  101 -- -- -- -- -- --   12/08/23 0630 -- 83 29 Abnormal  164/73 105 94 % -- -- -- -- --   12/08/23 0629 -- -- -- 164/73 -- -- -- -- -- -- --

## 2023-12-08 NOTE — PROGRESS NOTES
Patient:    MRN:  [id number]    Caesar Request ID:  [id number]    Level of care reserved:  Inpatient [facility]    Partner Reserved:  [facility] Suzanne HOLLAND, [address] [phone]    Clinical needs requested:    Geography searched:  30 miles around [address] [place] [address]    Start of Service:    Request sent:  2:09pm EST on 12/7/2023 by Scott Anaya    Partner reserved:  2:50pm EST on 12/8/2023 by Singh Salas    Choice list shared:

## 2023-12-08 NOTE — ASSESSMENT & PLAN NOTE
When patient is able to take p.o. continue valsartan and metoprolol. Unable to take p.o.   Family provided medication he is on valsartan 40 mg p.o. metoprolol 50 mg daily and amlodipine 10 mg daily for blood pressure control  Last night pressures into the 190s to 200 cc 20 mg of IV hydralazine and another 10 mg of IV hydralazine 10 of Lopressor was remaining in the 654 systolic   Restarted home medication metoprolol 50, Norvasc 10 mg and losartan 25 mg   wean off Cardene gave 1 dose of hydralazine during the night

## 2023-12-08 NOTE — ASSESSMENT & PLAN NOTE
Patient is a 78-year-old male with medical history pertinent for hypertension diabetes GERD and BPH presenting with dizziness, increasing weakness and garbled speech. Last seen well 12/5/23 11AM, refused to go to hospital until he fell 12/6/23 w/o injury. Recently tx w/azithro outpt for bronchitis. ER stroke alert called-pressure was 191/89 with aspirin yesterday NIH score of 2 and her CT revealed a small acute left basal ganglia and lateral midbrain IPH mild vasogenic edema. Repeat CT head 1 & 6h without interval IPH changes.  No LVO spot signs, no vascular malformations  Repeat CT of head prn for new or worsening neuro deficits  BP goal <140/90, s/p IV labetalol & hydralazine, cardene drip stopped, currently on lopressor BID, prn hydralazine 10mg IV  Elevate HOB 30+ degrees  Neurology is following  Hold DVT prophylaxis for the first 48 hours, restart 12/9 if no new bleed  Hold antiplatelets and anticoagulation  Consider outpatient MRI in 8 to 12 weeks

## 2023-12-08 NOTE — PHYSICAL THERAPY NOTE
PHYSICAL THERAPY NOTE          Patient Name: Taqueria Perez  NRXFT'U Date: 12/8/2023 12/08/23 1039   PT Last Visit   PT Visit Date 12/08/23   Note Type   Note Type Cancelled Session   Cancel Reasons Medical status       Chart reviewed. Spoke with RN, Sylvia Ho. Patient's BP elevated to 171/71 with HOB elevated > 30 degrees and 160/71 with HOB lowered. BP greater than goal of <140/90. Will cancel PT services at this time and see patient as medically appropriate at a later time.     Kristin Gutierrez, PT,DPT

## 2023-12-08 NOTE — DISCHARGE INSTR - DIET
Puree/ honey thick liquids  Meds MUST be crushed in puree    VBS completed 12/8     Oral stage:  Pt presented with moderate oral stage dysphagia. Anterior loss of HTL via cup. Mastication was not fully assessed d/t lack of dentition. Bolus formation and transfer were slow but functional.  Oral control was impaired w/ spillage over the tongue base of all consistencies to the valleculae and to the pyriforms w/ nectar thick and thin liquids. Pharyngeal stage:  Pt presented with moderate-severe pharyngeal dysphagia. Velar elevation noted. Swallowing initiation was delayed  Laryngeal rise and anterior hyoid excursion were severely impaired  Epiglottic inversion was absent. AIrway entrance closure was impaired  Tongue base retraction was moderately impaired  Pharyngeal constriction suspected or appeared adequate. PES opening was adequate. Management of food/liquid/barium tablet follows:   Small silent aspiration occurred w/ nectar thick via cup vallecular residual post swallow. Cued cough ineffective in clearing aspirated material.   Trace aspiration w/ cough occurred w/ thins via spoon on 1/2 trials. Cough not effective in clearing aspirated material.  Of note, silent aspiration occurred x1 w/ HTL via cup when pill was lodged in valleculae. Not counting this trial on PAS due to pill worsening swallow function at that time. Trace/transient penetration occurred w/ thin by spoon, nectar thick by spoon and honey thick by spoon.       Penetration/Aspiration:  Thin: PAS -7  Nectar: PAS- 8  Honey: PAS-2  Puree: PAS- 1              8-Point Penetration-Aspiration Scale   1 Material does not enter the airway   2 Material enters the airway, remains above the vocal folds, and is ejected  from the  airway    3 Material enters the airway, remains above the vocal folds, and is not ejected from the airway   4 Material enters the airway, contacts the vocal folds, and is ejected from the airway   5 Material enters the airway, contacts the vocal folds, and is not ejected from the airway    6 Material enters the airway, passes below the vocal folds and is ejected into the larynx or out of the airway    7 Material enters the airway, passes below the vocal folds, and is not ejected from the trachea despite effort    8 Material enters the airway, passes below the vocal folds, and no effort is made to eject          Strategies and Efficacy: Chin tuck, head turns, and puree/liquid washes not effective in clearing barium pill from valleculae. Aspiration Response and Efficacy:  Cough w/ thins via spoon, silent aspiration w/ nectar thick via cup (residue). Cued and reflexive cough not effective in clearing aspirated material.     Esophageal stage:  Brief view of esophagus was completed after administration of the barium tablet. Cleared from esophagus- WFL     Assessment Summary:    Pt presents with moderate-severe oropharyngeal dysphagia characterized by impaired mastication, impaired BOT strength, severely impaired hyolaryngeal elevation/excursion, absent epiglottic inversion. Aspiration described as above, occurred w/ thin liquids on initial swallow, occurred w/ nectar thick between first and second swallow on residue from valleculae. Mild-moderate vallecular residual w/ honey thick by spoon and cup, secondary swallow utilized w/ all trials assisted w/ clearance. No significant pyriform residue. Barium pill trialed whole in puree. Lodged in vallecular space for ~10 mins, suspect d/t absent epiglottic inversion. Trialed additional puree and HTL/NTL washes, head positioning, hard swallow. Pill eventually cleared w/ laryngeal massage from SLP     . Note: Images are available for review in PACS as desired.      Recommendations:   Recommended Diet:  puree/level 1 diet and honey thick liquids   Recommended Form of Medications: crushed with puree   Aspiration precautions and compensatory swallowing strategies: upright posture, only feed when fully alert, no straws, effortful swallow, and alternating bites and sips  Consider referral to:  N/A  SLP Dysphagia therapy recommended: Yes while inpatient and at next level of care, consider NMES.  Recommend repeat VBS prior to advancement of diet

## 2023-12-08 NOTE — PROGRESS NOTES
[address] [facility]  Progress Note  Name: Narcisa Lerma  MRN: [id number]  Unit/Bed#: -01 I Date of Admission: 12/6/2023   Date of Service: 12/8/2023 I Hospital Day: 2    Assessment/Plan   * Intraparenchymal hemorrhage of brain [facility])  Assessment & Plan  Patient is a 23-year-old male with medical history pertinent for hypertension diabetes GERD and BPH presenting with dizziness, increasing weakness and garbled speech. Last seen well 12/5/23 11AM, refused to go to hospital until he fell 12/6/23 w/o injury. Recently tx w/azithro outpt for bronchitis. ER stroke alert called-pressure was 191/89 with aspirin yesterday NIH score of 2 and her CT revealed a small acute left basal ganglia and lateral midbrain IPH mild vasogenic edema. Repeat CT head 1 & 6h without interval IPH changes. No LVO spot signs, no vascular malformations  Repeat CT of head prn for new or worsening neuro deficits  BP goal <140/90, s/p IV labetalol & hydralazine, cardene drip stopped, currently on lopressor BID, prn hydralazine 10mg IV  Elevate HOB 30+ degrees  Neurology is following  Hold DVT prophylaxis for the first 48 hours  Hold antiplatelets and anticoagulation  Consider outpatient MRI in 8 to 12 weeks      Hypertension  Assessment & Plan  When patient is able to take p.o. continue valsartan and metoprolol. Family expected to confirm home medication regimen with the team today  Unable to take p.o. Family provided medication he is on valsartan 40 mg p.o. metoprolol 50 mg daily and amlodipine 10 mg daily for blood pressure control  Last night pressures into the 190s to 200 cc 20 mg of IV hydralazine and another 10 mg of IV hydralazine 10 of Lopressor was remaining in the 320 systolic   The patient on a Cardene drip and correlation with Lopressor 5 mg every 6 and 20 mg of IV hydralazine every 6-switch patient back to stepdown level 1 due to being on 15 of Cardene blood pressure 140/70.        Tachypnea  Assessment & Plan  Tachypnea into 20's-30's on arrival, sats >92% throughout hospital stay, RA->3L->2L currently. May be secondary to acute stress and body habitus. Continue and wean as tolerated, sat goal >94% to ensure optimal brain oxygenation  Hx BASILIA, CPAP qhs    Stage 3a chronic kidney disease [facility])  Assessment & Plan  Lab Results   Component Value Date    EGFR 52 12/07/2023    EGFR 45 12/06/2023    CREATININE 1.25 12/07/2023    CREATININE 1.40 (H) 12/06/2023     His baseline creatinine is 1.4-1.5  Continue to monitor creatinine due to dye load     Benign prostatic hyperplasia without urinary obstruction  Assessment & Plan  Continue Flomax 0.4 mg p.o. Class 3 severe obesity with body mass index (BMI) of 40.0 to 44.9 in adult [facility])  Assessment & Plan  Dietary and lifestyle counseling recommended prior to discharge    Diabetes mellitus [facility])  Assessment & Plan  Lab Results   Component Value Date    HGBA1C 6.5 (H) 05/11/2023       Recent Labs     12/06/23  1831 12/06/23  2051 12/06/23  2350 12/07/23  0542   POCGLU 133 118 120 111         Blood Sugar Average: Last 72 hrs:  (P) 120.5    Insulin sliding scale                Disposition: Stepdown Level 1    ICU Core Measures     A: Assess, Prevent, and Manage Pain Has pain been assessed? Yes  Need for changes to pain regimen? NA   B: Both SAT/SAT  N/A   C: Choice of Sedation RASS Goal: 0 Alert and Calm or N/A patient not on sedation  Need for changes to sedation or analgesia regimen? NA   D: Delirium CAM-ICU: Negative   E: Early Mobility  Plan for early mobility? Yes   F: Family Engagement Plan for family engagement today?  Yes         Prophylaxis:  VTE Contraindicated secondary to: IPH    Stress Ulcer  covered bypantoprazole (PROTONIX) injection 40 mg [id number]         Significant 24hr Events     24hr events:     Patient was downgraded to stepdown to yesterday with a controlled blood pressure, evening started to rise his blood pressure into the 190s to 200 unresponsive to hydralazine and Lopressor. Started patient on a Cardene drip 15 mg/hr with hydralazine 20 every 6 and Lopressor 5 every 6 scheduled  Cancelled transfer to SD 2      Subjective   Review of Systems   Constitutional:  Positive for fatigue. HENT:  Positive for voice change. Respiratory:  Negative for cough and shortness of breath. Cardiovascular:  Positive for leg swelling. Negative for chest pain. Gastrointestinal:  Negative for abdominal distention, abdominal pain, nausea and vomiting. Neurological:  Positive for facial asymmetry. All other systems reviewed and are negative. Objective                            Vitals I/O      Most Recent Min/Max in 24hrs   Temp 97.9 °F (36.6 °C) Temp  Min: 97.4 °F (36.3 °C)  Max: 97.9 °F (36.6 °C)   Pulse 72 Pulse  Min: 64  Max: 82   Resp 20 Resp  Min: 14  Max: 27   /62 BP  Min: 116/58  Max: 191/83   O2 Sat 97 % SpO2  Min: 92 %  Max: 97 %      Intake/Output Summary (Last 24 hours) at 12/8/2023 0448  Last data filed at 12/8/2023 0300  Gross per 24 hour   Intake 356.25 ml   Output 1214 ml   Net -857.75 ml       Diet NPO    Invasive Monitoring           Physical Exam   Physical Exam  Vitals and nursing note reviewed. Eyes:      Extraocular Movements: Extraocular movements intact. Pupils: Pupils are equal, round, and reactive to light. Skin:     General: Skin is warm, dry and not mottled extremities. Capillary Refill: Capillary refill takes less than 2 seconds. Coloration: Skin is not pale. HENT:      Head: Normocephalic and atraumatic. Cardiovascular:      Rate and Rhythm: Normal rate and regular rhythm. Pulses: Normal pulses. Heart sounds: Normal heart sounds. Musculoskeletal:         General: Normal range of motion. Cervical back: Full passive range of motion without pain, normal range of motion and neck supple. Right lower leg: Trace Edema present. Left lower leg: Trace Edema present.    Abdominal: General: Bowel sounds are normal.      Palpations: Abdomen is soft. Constitutional:       General: He is awake. Appearance: He is well-developed and well-nourished. Pulmonary:      Effort: Pulmonary effort is normal.      Breath sounds: Normal breath sounds. No wheezing or rhonchi. Psychiatric:         Behavior: Behavior is cooperative. Neurological:      Mental Status: He is alert, easily aroused and oriented to person, place and time. Cranial Nerves: Dysarthria and facial asymmetry present. Motor: Strength full and intact in all extremities. Diagnostic Studies      EKG: NSR with RRB   Imaging:  I have personally reviewed pertinent reports.        Medications:  Scheduled PRN   hydrALAZINE, 20 mg, Q6H AVERY  insulin lispro, 1-6 Units, Q6H AVERY  levothyroxine, 200 mcg, Early Morning  metoprolol, 5 mg, Q6H  metoprolol succinate, 50 mg, Daily  pantoprazole, 40 mg, QAM      albuterol, 2 puff, Q6H PRN  hydrALAZINE, 10 mg, Q6H PRN  ondansetron, 4 mg, Q6H PRN  pneumococcal 20-concha conj vacc, 0.5 mL, Once PRN       Continuous    niCARdipine, 1-15 mg/hr, Last Rate: 15 mg/hr (12/08/23 0306)         Labs:    CBC    Recent Labs     12/06/23 1756 12/07/23 0438   WBC 8.14 12.46*   HGB 14.1 12.7   HCT 45.8 41.0    207     BMP    Recent Labs     12/06/23 1756 12/07/23 0438   SODIUM 139 141   K 4.1 4.1    107   CO2 29 28   AGAP 6 6   BUN 23 24   CREATININE 1.40* 1.25   CALCIUM 9.2 8.8       Coags    Recent Labs     12/06/23 1756   INR 0.98   PTT 32        Additional Electrolytes  Recent Labs     12/06/23 1756 12/07/23 0438   MG 1.9 1.8*          Blood Gas    No recent results  No recent results LFTs  Recent Labs     12/06/23 1756   ALT 16   AST 18   ALKPHOS 55   ALB 4.2   TBILI 0.61       Infectious  Recent Labs     12/06/23 1756   PROCALCITONI <0.05     Glucose  Recent Labs     12/06/23 1756 12/07/23 0438   GLUC 128 122                   Barbie Stallings PA-C

## 2023-12-08 NOTE — ASSESSMENT & PLAN NOTE
Lab Results   Component Value Date    HGBA1C 6.3 (H) 12/07/2023       Recent Labs     12/07/23  1147 12/07/23  1736 12/08/23  0052 12/08/23  0605   POCGLU 114 107 92 113         Blood Sugar Average: Last 72 hrs:  (P) 113.5    Insulin sliding scale

## 2023-12-08 NOTE — PROCEDURES
Video Swallow Study  Speech Pathology Videofluoroscopic Swallow Study (VFSS/VBSS/MBSS)      Patient Name: Jany Chapa    QDHYT'X Date: 12/8/2023       General Information;  Pt is a 78 y.o. male with a PMH remarkable for CKD, DM, obesity, HTN, new IPH. Current concerns for dysphagia include coughing w/ PO trials. A VFSS was recommended to assess oropharyngeal stage swallowing skills at this time. Pt was viewed in lateral position and assessed with honey thick liquid, nectar thick liquid, thin liquid, puree,  a13mm pill with puree    Oral stage:  Pt presented with moderate oral stage dysphagia. Anterior loss of HTL via cup. Mastication was not fully assessed d/t lack of dentition. Bolus formation and transfer were slow but functional.  Oral control was impaired w/ spillage over the tongue base of all consistencies to the valleculae and to the pyriforms w/ nectar thick and thin liquids. Pharyngeal stage:  Pt presented with moderate-severe pharyngeal dysphagia. Velar elevation noted. Swallowing initiation was delayed  Laryngeal rise and anterior hyoid excursion were severely impaired  Epiglottic inversion was absent. AIrway entrance closure was impaired  Tongue base retraction was moderately impaired  Pharyngeal constriction suspected or appeared adequate. PES opening was adequate. Management of food/liquid/barium tablet follows:   Small silent aspiration occurred w/ nectar thick via cup vallecular residual post swallow. Cued cough ineffective in clearing aspirated material.   Trace aspiration w/ cough occurred w/ thins via spoon on 1/2 trials. Cough not effective in clearing aspirated material.  Of note, silent aspiration occurred x1 w/ HTL via cup when pill was lodged in valleculae. Not counting this trial on PAS due to pill worsening swallow function at that time.    Trace/transient penetration occurred w/ thin by spoon, nectar thick by spoon and honey thick by spoon.     Penetration/Aspiration:  Thin: PAS -7  Nectar: PAS- 8  Honey: PAS-2  Puree: PAS- 1             8-Point Penetration-Aspiration Scale   1 Material does not enter the airway   2 Material enters the airway, remains above the vocal folds, and is ejected  from the  airway    3 Material enters the airway, remains above the vocal folds, and is not ejected from the airway   4 Material enters the airway, contacts the vocal folds, and is ejected from the airway   5 Material enters the airway, contacts the vocal folds, and is not ejected from the airway    6 Material enters the airway, passes below the vocal folds and is ejected into the larynx or out of the airway    7 Material enters the airway, passes below the vocal folds, and is not ejected from the trachea despite effort    8 Material enters the airway, passes below the vocal folds, and no effort is made to eject         Strategies and Efficacy: Chin tuck, head turns, and puree/liquid washes not effective in clearing barium pill from valleculae. Aspiration Response and Efficacy:  Cough w/ thins via spoon, silent aspiration w/ nectar thick via cup (residue). Cued and reflexive cough not effective in clearing aspirated material.    Esophageal stage:  Brief view of esophagus was completed after administration of the barium tablet. Cleared from esophagus- WFL    Assessment Summary:    Pt presents with moderate-severe oropharyngeal dysphagia characterized by impaired mastication, impaired BOT strength, severely impaired hyolaryngeal elevation/excursion, absent epiglottic inversion. Aspiration described as above, occurred w/ thin liquids on initial swallow, occurred w/ nectar thick between first and second swallow on residue from valleculae. Mild-moderate vallecular residual w/ honey thick by spoon and cup, secondary swallow utilized w/ all trials assisted w/ clearance. No significant pyriform residue. Barium pill trialed whole in puree.  Lodged in vallecular space for ~10 mins, suspect d/t absent epiglottic inversion. Trialed additional puree and HTL/NTL washes, head positioning, hard swallow. Pill eventually cleared w/ laryngeal massage from SLP    . Note: Images are available for review in PACS as desired. Recommendations:   Recommended Diet:  puree/level 1 diet and honey thick liquids   Recommended Form of Medications: crushed with puree   Aspiration precautions and compensatory swallowing strategies: upright posture, only feed when fully alert, no straws, effortful swallow, and alternating bites and sips  Consider referral to:  N/A  SLP Dysphagia therapy recommended: Yes while inpatient and at next level of care, consider NMES    Results Reviewed with: patient, RN, and PA   Pt/Family Education: Completed.  Pink aspiration sign hung in room      Raghu Denny  12/8/2023

## 2023-12-08 NOTE — CASE MANAGEMENT
Case Management Discharge Planning Note    Patient name Lovely Burns  Location /-17 MRN [id number]  : 1940 Date 2023       Current Admission Date: 2023  Current Admission Diagnosis:Intraparenchymal hemorrhage of brain [facility])   Patient Active Problem List    Diagnosis Date Noted    Dysphagia 2023    Tachypnea 2023    Diabetes mellitus ([address] [facility]) 2023    Hypertension 2023    Intraparenchymal hemorrhage of brain ([address] [facility]) 2023    Class 3 severe obesity with body mass index (BMI) of 40.0 to 44.9 in adult [facility]) 2023    Stage 3a chronic kidney disease ([facility]) 2023    Benign prostatic hyperplasia without urinary obstruction 2020      LOS (days): 2  Geometric Mean LOS (GMLOS) (days): 4.60  Days to GMLOS:2.8     OBJECTIVE:  Risk of Unplanned Readmission Score: 11.72         Current admission status: Inpatient   Preferred Pharmacy:   [address] [place] [address] Dr  [address] [place] [id number]  Phone: [phone] Fax: [phone]    Primary Care Provider: Cherry Musa MD    Primary Insurance: [facility] [employer]  Secondary Insurance:     DISCHARGE DETAILS:           [facility] has accepted pt at time of discharge

## 2023-12-08 NOTE — PLAN OF CARE
Problem: Potential for Falls  Goal: Patient will remain free of falls  Description: INTERVENTIONS:  - Educate patient/family on patient safety including physical limitations  - Instruct patient to call for assistance with activity   - Consult OT/PT to assist with strengthening/mobility   - Keep Call bell within reach  - Keep bed low and locked with side rails adjusted as appropriate  - Keep care items and personal belongings within reach  - Initiate and maintain comfort rounds  - Make Fall Risk Sign visible to staff  - Offer Toileting every 2 Hours, in advance of need if unable to reposition self  - Initiate/Maintain bed/chair alarm  - Apply yellow socks and bracelet for high fall risk patients  - Consider moving patient to room near nurses station  Outcome: Progressing     Problem: PAIN - ADULT  Goal: Verbalizes/displays adequate comfort level or baseline comfort level  Description: Interventions:  - Encourage patient to monitor pain and request assistance  - Assess pain using appropriate pain scale  - Administer analgesics based on type and severity of pain and evaluate response  - Implement non-pharmacological measures as appropriate and evaluate response  - Consider cultural and social influences on pain and pain management  - Notify physician/advanced practitioner if interventions unsuccessful or patient reports new pain  Outcome: Progressing     Problem: INFECTION - ADULT  Goal: Absence or prevention of progression during hospitalization  Description: INTERVENTIONS:  - Assess and monitor for signs and symptoms of infection  - Monitor lab/diagnostic results  - Monitor all insertion sites, i.e. indwelling lines, tubes, and drains  - Monitor endotracheal if appropriate and nasal secretions for changes in amount and color  - [place] appropriate cooling/warming therapies per order  - Administer medications as ordered  - Instruct and encourage patient and family to use good hand hygiene technique  - Identify and instruct in appropriate isolation precautions for identified infection/condition  Outcome: Progressing     Problem: DISCHARGE PLANNING  Goal: Discharge to home or other facility with appropriate resources  Description: INTERVENTIONS:  - Identify barriers to discharge w/patient and caregiver  - Arrange for needed discharge resources and transportation as appropriate  - Identify discharge learning needs (meds, wound care, etc.)  - Arrange for interpretive services to assist at discharge as needed  - Refer to Case Management Department for coordinating discharge planning if the patient needs post-hospital services based on physician/advanced practitioner order or complex needs related to functional status, cognitive ability, or social support system  Outcome: Progressing     Problem: Knowledge Deficit  Goal: Patient/family/caregiver demonstrates understanding of disease process, treatment plan, medications, and discharge instructions  Description: Complete learning assessment and assess knowledge base.   Interventions:  - Provide teaching at level of understanding  - Provide teaching via preferred learning methods  Outcome: Progressing     Problem: NEUROSENSORY - ADULT  Goal: Achieves stable or improved neurological status  Description: INTERVENTIONS  - Monitor and report changes in neurological status  - Monitor vital signs such as temperature, blood pressure, glucose, and any other labs ordered   - Initiate measures to prevent increased intracranial pressure  - Monitor for seizure activity and implement precautions if appropriate      Outcome: Progressing  Goal: Remains free of injury related to seizures activity  Description: INTERVENTIONS  - Maintain airway, patient safety  and administer oxygen as ordered  - Monitor patient for seizure activity, document and report duration and description of seizure to physician/advanced practitioner  - If seizure occurs,  ensure patient safety during seizure  - Reorient patient post seizure  - Seizure pads on all 4 side rails  - Instruct patient/family to notify RN of any seizure activity including if an aura is experienced  - Instruct patient/family to call for assistance with activity based on nursing assessment  - Administer anti-seizure medications if ordered    Outcome: Progressing  Goal: Achieves maximal functionality and self care  Description: INTERVENTIONS  - Monitor swallowing and airway patency with patient fatigue and changes in neurological status  - Encourage and assist patient to increase activity and self care. - Encourage visually impaired, hearing impaired and aphasic patients to use assistive/communication devices  Outcome: Progressing     Problem: Prexisting or High Potential for Compromised Skin Integrity  Goal: Skin integrity is maintained or improved  Description: INTERVENTIONS:  - Identify patients at risk for skin breakdown  - Assess and monitor skin integrity  - Assess and monitor nutrition and hydration status  - Monitor labs   - Assess for incontinence   - Turn and reposition patient  - Assist with mobility/ambulation  - Relieve pressure over bony prominences  - Avoid friction and shearing  - Provide appropriate hygiene as needed including keeping skin clean and dry  - Evaluate need for skin moisturizer/barrier cream  - Collaborate with interdisciplinary team   - Patient/family teaching  - Consider wound care consult   Outcome: Progressing     Problem: Nutrition/Hydration-ADULT  Goal: Nutrient/Hydration intake appropriate for improving, restoring or maintaining nutritional needs  Description: Monitor and assess patient's nutrition/hydration status for malnutrition. Collaborate with interdisciplinary team and initiate plan and interventions as ordered. Monitor patient's weight and dietary intake as ordered or per policy. Utilize nutrition screening tool and intervene as necessary.  Determine patient's food preferences and provide high-protein, high-caloric foods as appropriate.      INTERVENTIONS:  - Monitor oral intake, urinary output, labs, and treatment plans  - Assess nutrition and hydration status and recommend course of action  - Evaluate amount of meals eaten  - Assist patient with eating if necessary   - Allow adequate time for meals  - Recommend/ encourage appropriate diets, oral nutritional supplements, and vitamin/mineral supplements  - Order, calculate, and assess calorie counts as needed  - Recommend, monitor, and adjust tube feedings and TPN/PPN based on assessed needs  - Assess need for intravenous fluids  - Provide specific nutrition/hydration education as appropriate  - Include patient/family/caregiver in decisions related to nutrition  Outcome: Progressing

## 2023-12-08 NOTE — UTILIZATION REVIEW
NOTIFICATION OF INPATIENT ADMISSION   AUTHORIZATION REQUEST   SERVICING FACILITY:   [address]  Tax ID: [id number]  NPI: [id number] ATTENDING PROVIDER:  Attending Name and NPI#: Claudio Salazar [[id number]]  Address: [address] [place] [address]  Phone: [phone]   ADMISSION INFORMATION:  Place of Service: [address] [place] [address]  Place of Service Code: 21  Inpatient Admission Date/Time: 12/6/23  7:16 PM  Discharge Date/Time: No discharge date for patient encounter. Admitting Diagnosis Code/Description:  Weakness [R53.1]  Thalamic hemorrhage ([facility]) [I61.0]  Garbled speech [R47.89]     UTILIZATION REVIEW CONTACT:  Hardeep Boykin Utilization   Network Utilization Review Department  Phone: [phone]  Fax [phone]  Email: [email or web address]  Contact for approvals/pending authorizations, clinical reviews, and discharge. PHYSICIAN ADVISORY SERVICES:  Medical Necessity Denial & Khsh-dt-Adyt Review  Phone: [phone]  Fax: [phone]  Email: [email or web address]     DISCHARGE SUPPORT TEAM:  For Patients Discharge Needs & Updates  Phone: [phone] opt. 2 Fax: [phone]  Email: [email or web address]

## 2023-12-08 NOTE — PLAN OF CARE
Problem: Potential for Falls  Goal: Patient will remain free of falls  Description: INTERVENTIONS:  - Educate patient/family on patient safety including physical limitations  - Instruct patient to call for assistance with activity   - Consult OT/PT to assist with strengthening/mobility   - Keep Call bell within reach  - Keep bed low and locked with side rails adjusted as appropriate  - Keep care items and personal belongings within reach  - Initiate and maintain comfort rounds  - Make Fall Risk Sign visible to staff  - Offer Toileting every 2 Hours, in advance of need if unable to reposition self  - Initiate/Maintain bed/chair alarm  - Apply yellow socks and bracelet for high fall risk patients  - Consider moving patient to room near nurses station  Outcome: Progressing     Problem: PAIN - ADULT  Goal: Verbalizes/displays adequate comfort level or baseline comfort level  Description: Interventions:  - Encourage patient to monitor pain and request assistance  - Assess pain using appropriate pain scale  - Administer analgesics based on type and severity of pain and evaluate response  - Implement non-pharmacological measures as appropriate and evaluate response  - Consider cultural and social influences on pain and pain management  - Notify physician/advanced practitioner if interventions unsuccessful or patient reports new pain  Outcome: Progressing     Problem: INFECTION - ADULT  Goal: Absence or prevention of progression during hospitalization  Description: INTERVENTIONS:  - Assess and monitor for signs and symptoms of infection  - Monitor lab/diagnostic results  - Monitor all insertion sites, i.e. indwelling lines, tubes, and drains  - Monitor endotracheal if appropriate and nasal secretions for changes in amount and color  - [place] appropriate cooling/warming therapies per order  - Administer medications as ordered  - Instruct and encourage patient and family to use good hand hygiene technique  - Identify and instruct in appropriate isolation precautions for identified infection/condition  Outcome: Progressing     Problem: SAFETY ADULT  Goal: Patient will remain free of falls  Description: INTERVENTIONS:  - Educate patient/family on patient safety including physical limitations  - Instruct patient to call for assistance with activity   - Consult OT/PT to assist with strengthening/mobility   - Keep Call bell within reach  - Keep bed low and locked with side rails adjusted as appropriate  - Keep care items and personal belongings within reach  - Initiate and maintain comfort rounds  - Make Fall Risk Sign visible to staff  - Offer Toileting every 2 Hours, in advance of need if unable to reposition self  - Initiate/Maintain bed/chair alarm  - Apply yellow socks and bracelet for high fall risk patients  - Consider moving patient to room near nurses station  Outcome: Progressing  Goal: Maintain or return to baseline ADL function  Description: INTERVENTIONS:  -  Assess patient's ability to carry out ADLs; assess patient's baseline for ADL function and identify physical deficits which impact ability to perform ADLs (bathing, care of mouth/teeth, toileting, grooming, dressing, etc.)  - Assess/evaluate cause of self-care deficits   - Assess range of motion  - Assess patient's mobility; develop plan if impaired  - Assess patient's need for assistive devices and provide as appropriate  - Encourage maximum independence but intervene and supervise when necessary  - Involve family in performance of ADLs  - Assess for home care needs following discharge   - Consider OT consult to assist with ADL evaluation and planning for discharge  - Provide patient education as appropriate  Outcome: Progressing  Goal: Maintains/Returns to pre admission functional level  Description: INTERVENTIONS:  - Perform AM-PAC 6 Click Basic Mobility/ Daily Activity assessment daily.  - Set and communicate daily mobility goal to care team and patient/family/caregiver. - Collaborate with rehabilitation services on mobility goals if consulted  - Perform Range of Motion 3 times a day. - Reposition patient every 2 hours if pt unable to reposition self  - Record patient progress and toleration of activity level   Outcome: Progressing     Problem: DISCHARGE PLANNING  Goal: Discharge to home or other facility with appropriate resources  Description: INTERVENTIONS:  - Identify barriers to discharge w/patient and caregiver  - Arrange for needed discharge resources and transportation as appropriate  - Identify discharge learning needs (meds, wound care, etc.)  - Arrange for interpretive services to assist at discharge as needed  - Refer to Case Management Department for coordinating discharge planning if the patient needs post-hospital services based on physician/advanced practitioner order or complex needs related to functional status, cognitive ability, or social support system  Outcome: Progressing     Problem: Knowledge Deficit  Goal: Patient/family/caregiver demonstrates understanding of disease process, treatment plan, medications, and discharge instructions  Description: Complete learning assessment and assess knowledge base.   Interventions:  - Provide teaching at level of understanding  - Provide teaching via preferred learning methods  Outcome: Progressing     Problem: NEUROSENSORY - ADULT  Goal: Achieves stable or improved neurological status  Description: INTERVENTIONS  - Monitor and report changes in neurological status  - Monitor vital signs such as temperature, blood pressure, glucose, and any other labs ordered   - Initiate measures to prevent increased intracranial pressure  - Monitor for seizure activity and implement precautions if appropriate      Outcome: Progressing  Goal: Remains free of injury related to seizures activity  Description: INTERVENTIONS  - Maintain airway, patient safety  and administer oxygen as ordered  - Monitor patient for seizure activity, document and report duration and description of seizure to physician/advanced practitioner  - If seizure occurs,  ensure patient safety during seizure  - Reorient patient post seizure  - Seizure pads on all 4 side rails  - Instruct patient/family to notify RN of any seizure activity including if an aura is experienced  - Instruct patient/family to call for assistance with activity based on nursing assessment  - Administer anti-seizure medications if ordered    Outcome: Progressing  Goal: Achieves maximal functionality and self care  Description: INTERVENTIONS  - Monitor swallowing and airway patency with patient fatigue and changes in neurological status  - Encourage and assist patient to increase activity and self care. - Encourage visually impaired, hearing impaired and aphasic patients to use assistive/communication devices  Outcome: Progressing     Problem: Prexisting or High Potential for Compromised Skin Integrity  Goal: Skin integrity is maintained or improved  Description: INTERVENTIONS:  - Identify patients at risk for skin breakdown  - Assess and monitor skin integrity  - Assess and monitor nutrition and hydration status  - Monitor labs   - Assess for incontinence   - Turn and reposition patient  - Assist with mobility/ambulation  - Relieve pressure over bony prominences  - Avoid friction and shearing  - Provide appropriate hygiene as needed including keeping skin clean and dry  - Evaluate need for skin moisturizer/barrier cream  - Collaborate with interdisciplinary team   - Patient/family teaching  - Consider wound care consult   Outcome: Progressing     Problem: Nutrition/Hydration-ADULT  Goal: Nutrient/Hydration intake appropriate for improving, restoring or maintaining nutritional needs  Description: Monitor and assess patient's nutrition/hydration status for malnutrition. Collaborate with interdisciplinary team and initiate plan and interventions as ordered.   Monitor patient's weight and dietary intake as ordered or per policy. Utilize nutrition screening tool and intervene as necessary. Determine patient's food preferences and provide high-protein, high-caloric foods as appropriate.      INTERVENTIONS:  - Monitor oral intake, urinary output, labs, and treatment plans  - Assess nutrition and hydration status and recommend course of action  - Evaluate amount of meals eaten  - Assist patient with eating if necessary   - Allow adequate time for meals  - Recommend/ encourage appropriate diets, oral nutritional supplements, and vitamin/mineral supplements  - Order, calculate, and assess calorie counts as needed  - Recommend, monitor, and adjust tube feedings and TPN/PPN based on assessed needs  - Assess need for intravenous fluids  - Provide specific nutrition/hydration education as appropriate  - Include patient/family/caregiver in decisions related to nutrition  Outcome: Progressing

## 2023-12-08 NOTE — SPEECH THERAPY NOTE
Speech Language/Pathology    Speech/Language Pathology Progress Note    Patient Name: Jocelynn Moralez  UEBNG'H Date: 12/8/2023     Subjective:  Pt asked if he had to watch cartoons    Objective:  Assess for ability to tolerate PO intake. Pt currently NPO secondary to coughing w/ PO trials during bedside swallow assessment last evening. Language/speech eval completed as well, see below    Assessment:  Oral cavity noted to be extremely dry. Lingual resting position on alveolar ridge. Pt edentulous, reports dentures are at home. PO trials completed w/ ice chips x3, thins via spoon and cup, nectar via spoon and cup, and puree. Pt presents w/ moderate oral dysphagia characterized by suspected impaired mastication (solids not trialed d/t lack of dentition), impaired bolus formation and suspected impaired control. Suspected pharyngeal phase dysphagia characterized by immediate cough w/ thin and nectar thick by cup x1 each, delayed cough w/ puree x2 and thins by spoon. Plan/Recommendations:  Continue NPO w/ ice chips and frequent oral care  Will complete VBS today to further assess pharyngeal phase     Speech/Language Evaluation      Patient Name: Jocelynn Moralez    Summary   Pt presents w/ moderate-severe dysarthria characterized by imprecise articulatory precision w/ mid and back consonants, impaired resonance, and poor coordination. Sequential DDKs were impaired for rate and clarity, worse w/ mid and back sounds and alternating DDKs were significantly impaired. Connected speech is approx 50% intelligible, improvement noted w/ cues for decreased rate and over articulation. Receptive language appears grossly intact for simple/complex yes/no questions and 1-2 step commands. Pt demo'd decreased accuracy w/ 3 step commands, suspect more related to cognition. Expressive language is mildly impaired characterized by impaired automatics (VANNA- pt started w/ March, skipped Sept/Oct) although ? related to cog.  Object naming was intact. Reading comprehension intact. Limited writing assessment- pt w/ decreased legibility but content appeared [facility]. Cognition is appears moderately impaired but requires further assessment. Pt oriented to self, place, and year but not to month, date, OLIVIER. Recalled bio info IND. Not oriented to situation. Recommendation:  Continue speech therapy for speech/cognition both inpatient and next level of care. Patient's goal:   Pt reports he was functional and still driving at home, would like to return to same.     General Cognitive Status:  Alert with adequate attention    Auditory Comprehension:    One step commands: 100%  Two step commands: 100%  Complex or 3 step commands: 70%  Simple y/n ?'s: 100%  Complex y/n ?'s: 80%  Comprehension of Conversation: TBD    Reading Comprehension:  Simple direction following: intact        Speech Mechanism Examination:   Facial: symmetrical  Labial: decreased strength and decreased coordination  Lingual: bilateral decreased strength and decreased coordination  Velum: unable to visualize  Mandible: adequate ROM  Dentition: edentulous  Vocal quality:clear/adequate       Oral Expression:  Auto Sequences:  Days[de-identified] intact   Months impaired  Confrontation Namin%  Responsive Namin%  Able to make basic needs known:  intact    Written Expression:  Name: intact but poor legibility  Address: intact but poor legibility    Motor Speech:  Dysarthria:   Imprecise artic: present   Rate: rapid at times   Nasality: hyponasal   Breath support: intact   Volume: low  Apraxia: not noted       Orientation:  Person: intact  Place: Hospital[facility]: intact  City: intact  Time of Day: impaired  Month: impaired  OLIVIER: impaired  Year: intact  Reason for hospitalization: impaired    Cognitive -linguistic skills:  Impaired  Needs further evaluation    Results discussed with:    Patient    Godfrey Abreu, [id number] [facility] CCC-SLP  2023

## 2023-12-09 ENCOUNTER — APPOINTMENT (INPATIENT)
Dept: RADIOLOGY | Facility: HOSPITAL | Age: 83
End: 2023-12-09
Payer: COMMERCIAL

## 2023-12-09 PROBLEM — E11.9 DIABETES MELLITUS (HCC): Chronic | Status: ACTIVE | Noted: 2023-12-06

## 2023-12-09 PROBLEM — I10 HYPERTENSION: Chronic | Status: ACTIVE | Noted: 2023-12-06

## 2023-12-09 LAB
ALL TARGETS: NOT DETECTED
ANION GAP SERPL CALCULATED.3IONS-SCNC: 9 MMOL/L
ATRIAL RATE: 74 BPM
ATRIAL RATE: 92 BPM
BASOPHILS # BLD AUTO: 0.04 THOUSANDS/ÂΜL (ref 0–0.1)
BASOPHILS NFR BLD AUTO: 0 % (ref 0–1)
BUN SERPL-MCNC: 28 MG/DL (ref 5–25)
CALCIUM SERPL-MCNC: 8.5 MG/DL (ref 8.4–10.2)
CHLORIDE SERPL-SCNC: 110 MMOL/L (ref 96–108)
CO2 SERPL-SCNC: 23 MMOL/L (ref 21–32)
CREAT SERPL-MCNC: 1.28 MG/DL (ref 0.6–1.3)
EOSINOPHIL # BLD AUTO: 0.02 THOUSAND/ÂΜL (ref 0–0.61)
EOSINOPHIL NFR BLD AUTO: 0 % (ref 0–6)
ERYTHROCYTE [DISTWIDTH] IN BLOOD BY AUTOMATED COUNT: 15.9 % (ref 11.6–15.1)
GFR SERPL CREATININE-BSD FRML MDRD: 50 ML/MIN/1.73SQ M
GLUCOSE SERPL-MCNC: 114 MG/DL (ref 65–140)
GLUCOSE SERPL-MCNC: 118 MG/DL (ref 65–140)
GLUCOSE SERPL-MCNC: 120 MG/DL (ref 65–140)
GLUCOSE SERPL-MCNC: 129 MG/DL (ref 65–140)
GLUCOSE SERPL-MCNC: 99 MG/DL (ref 65–140)
HCT VFR BLD AUTO: 42.1 % (ref 36.5–49.3)
HGB BLD-MCNC: 13 G/DL (ref 12–17)
IMM GRANULOCYTES # BLD AUTO: 0.06 THOUSAND/UL (ref 0–0.2)
IMM GRANULOCYTES NFR BLD AUTO: 0 % (ref 0–2)
LYMPHOCYTES # BLD AUTO: 2.08 THOUSANDS/ÂΜL (ref 0.6–4.47)
LYMPHOCYTES NFR BLD AUTO: 16 % (ref 14–44)
MCH RBC QN AUTO: 26.6 PG (ref 26.8–34.3)
MCHC RBC AUTO-ENTMCNC: 30.9 G/DL (ref 31.4–37.4)
MCV RBC AUTO: 86 FL (ref 82–98)
MONOCYTES # BLD AUTO: 1.22 THOUSAND/ÂΜL (ref 0.17–1.22)
MONOCYTES NFR BLD AUTO: 9 % (ref 4–12)
NEUTROPHILS # BLD AUTO: 9.92 THOUSANDS/ÂΜL (ref 1.85–7.62)
NEUTS SEG NFR BLD AUTO: 75 % (ref 43–75)
NRBC BLD AUTO-RTO: 0 /100 WBCS
P AXIS: 82 DEGREES
PLATELET # BLD AUTO: 222 THOUSANDS/UL (ref 149–390)
PMV BLD AUTO: 9.4 FL (ref 8.9–12.7)
POTASSIUM SERPL-SCNC: 3.8 MMOL/L (ref 3.5–5.3)
PR INTERVAL: 332 MS
QRS AXIS: -62 DEGREES
QRS AXIS: -63 DEGREES
QRSD INTERVAL: 164 MS
QRSD INTERVAL: 166 MS
QT INTERVAL: 426 MS
QT INTERVAL: 430 MS
QTC INTERVAL: 472 MS
QTC INTERVAL: 528 MS
RBC # BLD AUTO: 4.89 MILLION/UL (ref 3.88–5.62)
SODIUM SERPL-SCNC: 142 MMOL/L (ref 135–147)
T WAVE AXIS: 12 DEGREES
T WAVE AXIS: 42 DEGREES
VENTRICULAR RATE: 74 BPM
VENTRICULAR RATE: 91 BPM
WBC # BLD AUTO: 13.34 THOUSAND/UL (ref 4.31–10.16)

## 2023-12-09 PROCEDURE — 94664 DEMO&/EVAL PT USE INHALER: CPT

## 2023-12-09 PROCEDURE — NC001 PR NO CHARGE: Performed by: PHYSICIAN ASSISTANT

## 2023-12-09 PROCEDURE — 85025 COMPLETE CBC W/AUTO DIFF WBC: CPT | Performed by: NURSE PRACTITIONER

## 2023-12-09 PROCEDURE — 82948 REAGENT STRIP/BLOOD GLUCOSE: CPT

## 2023-12-09 PROCEDURE — C9113 INJ PANTOPRAZOLE SODIUM, VIA: HCPCS

## 2023-12-09 PROCEDURE — 94668 MNPJ CHEST WALL SBSQ: CPT

## 2023-12-09 PROCEDURE — 71045 X-RAY EXAM CHEST 1 VIEW: CPT

## 2023-12-09 PROCEDURE — 80048 BASIC METABOLIC PNL TOTAL CA: CPT | Performed by: NURSE PRACTITIONER

## 2023-12-09 PROCEDURE — 99233 SBSQ HOSP IP/OBS HIGH 50: CPT | Performed by: STUDENT IN AN ORGANIZED HEALTH CARE EDUCATION/TRAINING PROGRAM

## 2023-12-09 PROCEDURE — 94760 N-INVAS EAR/PLS OXIMETRY 1: CPT

## 2023-12-09 RX ORDER — METOPROLOL TARTRATE 50 MG/1
50 TABLET, FILM COATED ORAL EVERY 12 HOURS SCHEDULED
Status: DISCONTINUED | OUTPATIENT
Start: 2023-12-09 | End: 2023-12-14 | Stop reason: HOSPADM

## 2023-12-09 RX ORDER — HEPARIN SODIUM 5000 [USP'U]/ML
5000 INJECTION, SOLUTION INTRAVENOUS; SUBCUTANEOUS EVERY 12 HOURS SCHEDULED
Status: DISCONTINUED | OUTPATIENT
Start: 2023-12-09 | End: 2023-12-14 | Stop reason: HOSPADM

## 2023-12-09 RX ADMIN — AMLODIPINE BESYLATE 10 MG: 10 TABLET ORAL at 09:41

## 2023-12-09 RX ADMIN — HEPARIN SODIUM 5000 UNITS: 5000 INJECTION INTRAVENOUS; SUBCUTANEOUS at 20:05

## 2023-12-09 RX ADMIN — LIDOCAINE 5% 2 PATCH: 700 PATCH TOPICAL at 20:04

## 2023-12-09 RX ADMIN — METOPROLOL TARTRATE 50 MG: 50 TABLET, FILM COATED ORAL at 20:13

## 2023-12-09 RX ADMIN — METOPROLOL TARTRATE 50 MG: 50 TABLET, FILM COATED ORAL at 11:07

## 2023-12-09 RX ADMIN — LEVOTHYROXINE SODIUM 200 MCG: 100 TABLET ORAL at 05:33

## 2023-12-09 RX ADMIN — PANTOPRAZOLE SODIUM 40 MG: 40 INJECTION, POWDER, FOR SOLUTION INTRAVENOUS at 05:33

## 2023-12-09 RX ADMIN — LOSARTAN POTASSIUM 25 MG: 25 TABLET, FILM COATED ORAL at 09:41

## 2023-12-09 RX ADMIN — HEPARIN SODIUM 5000 UNITS: 5000 INJECTION INTRAVENOUS; SUBCUTANEOUS at 09:40

## 2023-12-09 NOTE — PROGRESS NOTES
[address] [facility]  Progress Note  Name: Jean Carlos Tamayo  MRN: [id number]  Unit/Bed#: -01 I Date of Admission: 12/6/2023   Date of Service: 12/9/2023 I Hospital Day: 3    Assessment/Plan   * Intraparenchymal hemorrhage of brain [facility])  Assessment & Plan  Patient is a 47-year-old male with medical history pertinent for hypertension diabetes GERD and BPH presenting with dizziness, increasing weakness and garbled speech. Last seen well 12/5/23 11AM, refused to go to hospital until he fell 12/6/23 w/o injury. Recently tx w/azithro outpt for bronchitis. ER stroke alert called-pressure was 191/89 with aspirin yesterday NIH score of 2 and her CT revealed a small acute left basal ganglia and lateral midbrain IPH mild vasogenic edema. Repeat CT head 1 & 6h without interval IPH changes. No LVO spot signs, no vascular malformations  Repeat CT of head prn for new or worsening neuro deficits  BP goal <140/90, s/p IV labetalol & hydralazine, cardene drip stopped, currently on lopressor BID, prn hydralazine 10mg IV  Elevate HOB 30+ degrees  Neurology is following  Hold DVT prophylaxis for the first 48 hours, restart 12/9 if no new bleed  Hold antiplatelets and anticoagulation  Consider outpatient MRI in 8 to 12 weeks      Hypertension  Assessment & Plan  When patient is able to take p.o. continue valsartan and metoprolol. Unable to take p.o. Family provided medication he is on valsartan 40 mg p.o. metoprolol 50 mg daily and amlodipine 10 mg daily for blood pressure control  Last night pressures into the 190s to 200 cc 20 mg of IV hydralazine and another 10 mg of IV hydralazine 10 of Lopressor was remaining in the 537 systolic   Restarted home medication metoprolol 50, Norvasc 10 mg and losartan 25 mg   wean off Cardene gave 1 dose of hydralazine during the night          Tachypnea  Assessment & Plan  Tachypnea into 20's-30's on arrival, sats >92% on room air currently.  Sat goal >94% to ensure optimal brain oxygenation  Hx BASILIA, CPAP qhs    Stage 3a chronic kidney disease [facility])  Assessment & Plan  Lab Results   Component Value Date    EGFR 49 12/08/2023    EGFR 52 12/07/2023    EGFR 45 12/06/2023    CREATININE 1.31 (H) 12/08/2023    CREATININE 1.25 12/07/2023    CREATININE 1.40 (H) 12/06/2023     His baseline creatinine is 1.4-1.5  Trend creatinine    Benign prostatic hyperplasia without urinary obstruction  Assessment & Plan  Continue Flomax 0.4 mg p.o. Class 3 severe obesity with body mass index (BMI) of 40.0 to 44.9 in adult [facility])  Assessment & Plan  Dietary and lifestyle counseling recommended prior to discharge    Diabetes mellitus [facility])  Assessment & Plan  Lab Results   Component Value Date    HGBA1C 6.3 (H) 12/07/2023       Recent Labs     12/07/23  1147 12/07/23  1736 12/08/23  0052 12/08/23  0605   POCGLU 114 107 92 113         Blood Sugar Average: Last 72 hrs:  (P) 113.5    Insulin sliding scale       Dysphagia  Assessment & Plan  SLP eval  VBS             Disposition: Stepdown Level 1    ICU Core Measures     A: Assess, Prevent, and Manage Pain Has pain been assessed? Yes  Need for changes to pain regimen? NA   B: Both SAT/SAT  N/A   C: Choice of Sedation RASS Goal: 0 Alert and Calm or N/A patient not on sedation  Need for changes to sedation or analgesia regimen? NA   D: Delirium CAM-ICU: Negative   E: Early Mobility  Plan for early mobility? Yes   F: Family Engagement Plan for family engagement today? Yes       Review of Invasive Devices:     [place] Plan: Voiding trial after improvement in ambulation         Prophylaxis:  VTE Contraindicated secondary to: IPH   Stress Ulcer  covered bypantoprazole (PROTONIX) injection 40 mg [[id number]]         Significant 24hr Events     24hr events:  Restarted on p.o. home medications for hypertension  Received hydralazine 10 mg during the night x 1 to wean off Cardene  Increased Norvasc to 10 mg daily     Subjective   Review of Systems   Constitutional:  Positive for fatigue. Neurological:  Positive for facial asymmetry and speech difficulty. All other systems reviewed and are negative. Objective                            Vitals I/O      Most Recent Min/Max in 24hrs   Temp 98.1 °F (36.7 °C) Temp  Min: 98.1 °F (36.7 °C)  Max: 98.4 °F (36.9 °C)   Pulse 65 Pulse  Min: 63  Max: 128   Resp 22 Resp  Min: 16  Max: 37   BP (!) 112/44 BP  Min: 109/65  Max: 190/80   O2 Sat 96 % SpO2  Min: 88 %  Max: 98 %      Intake/Output Summary (Last 24 hours) at 2023 0453  Last data filed at 2023 0305  Gross per 24 hour   Intake 3423.75 ml   Output 1000 ml   Net 2423.75 ml       Diet Dysphagia/Modified Consistency; Dysphagia 1-Pureed; Honey Thick Liquid    Invasive Monitoring           Physical Exam   Physical Exam  Vitals and nursing note reviewed. Eyes:      Extraocular Movements: Extraocular movements intact. Pupils: Pupils are equal, round, and reactive to light. Skin:     General: Skin is warm, dry and not mottled extremities. Capillary Refill: Capillary refill takes less than 2 seconds. Coloration: Skin is not pale. HENT:      Head: Normocephalic and atraumatic. Cardiovascular:      Rate and Rhythm: Normal rate and regular rhythm. Pulses: Normal pulses. Heart sounds: Normal heart sounds. Musculoskeletal:         General: Normal range of motion. Cervical back: Full passive range of motion without pain, normal range of motion and neck supple. Right lower le+ Edema present. Left lower le+ Edema present. Abdominal: General: Abdomen is protuberant. Bowel sounds are normal.      Palpations: Abdomen is soft. Constitutional:       General: He is awake. Appearance: He is well-developed and well-nourished. He is obese. He is ill-appearing. Interventions: Nasal cannula in place. Pulmonary:      Effort: Pulmonary effort is normal.      Breath sounds: Normal breath sounds. No wheezing or rhonchi.    Psychiatric: Behavior: Behavior is cooperative. Neurological:      Mental Status: He is alert, easily aroused and oriented to person, place and time. Mental status is at baseline. Cranial Nerves: Dysarthria and facial asymmetry present. Genitourinary/Anorectal:  [place] present. Diagnostic Studies      EKG: NSR with RBBB  Imaging:  I have personally reviewed pertinent reports.        Medications:  Scheduled PRN   amLODIPine, 10 mg, Daily  insulin lispro, 1-6 Units, 4x Daily (AC & HS)  levothyroxine, 200 mcg, Early Morning  lidocaine, 2 patch, Q24H  losartan, 25 mg, Daily  metoprolol succinate, 50 mg, Daily  pantoprazole, 40 mg, QAM      albuterol, 2 puff, Q6H PRN  bisacodyl, 10 mg, PRN  hydrALAZINE, 10 mg, Q4H PRN  ondansetron, 4 mg, Q6H PRN  pneumococcal 20-concha conj vacc, 0.5 mL, Once PRN       Continuous    niCARdipine, 1-15 mg/hr, Last Rate: Stopped (12/09/23 0305)         Labs:    CBC    Recent Labs     12/08/23  0601   WBC 10.37*   HGB 14.0   HCT 45.6        BMP    Recent Labs     12/08/23  0601   SODIUM 140   K 4.2      CO2 27   AGAP 7   BUN 21   CREATININE 1.31*   CALCIUM 9.3       Coags    No recent results     Additional Electrolytes  Recent Labs     12/08/23  0601   MG 2.2   PHOS 3.1          Blood Gas    No recent results  No recent results LFTs  No recent results    Infectious  No recent results  Glucose  Recent Labs     12/08/23  0601   GLUC 115                   Barbie Stallings PA-C

## 2023-12-09 NOTE — RESPIRATORY THERAPY NOTE
RT Protocol Note  Havery Dates 78 y.o. male MRN: [id number]  Unit/Bed#: -01 Encounter: [id number]    Assessment    Principal Problem:    Intraparenchymal hemorrhage of brain (HCC)  Active Problems:    Diabetes mellitus ([address] )    Hypertension    Class 3 severe obesity with body mass index (BMI) of 40.0 to 44.9 in adult [facility])    Benign prostatic hyperplasia without urinary obstruction    Stage 3a chronic kidney disease (HCC)    Tachypnea    Dysphagia      Home Pulmonary Medications:    Home Devices/Therapy: BiPAP/CPAP    Past Medical History:   Diagnosis Date    Diabetes mellitus ([address] )     GERD (gastroesophageal reflux disease)     Hypertension      Social History     Socioeconomic History    Marital status: /Civil Union     Spouse name: None    Number of children: None    Years of education: None    Highest education level: None   Occupational History    None   Tobacco Use    Smoking status: Former     Packs/day: 5.00     Years: 15.00     Total pack years: 75.00     Types: Cigarettes    Smokeless tobacco: Never   Substance and Sexual Activity    Alcohol use: Not Currently    Drug use: Never    Sexual activity: None   Other Topics Concern    None   Social History Narrative    None     Social Determinants of Health     Financial Resource Strain: Not on file   Food Insecurity: No Food Insecurity (12/7/2023)    Hunger Vital Sign     Worried About Running Out of Food in the Last Year: Never true     Ran Out of Food in the Last Year: Never true   Transportation Needs: No Transportation Needs (12/7/2023)    PRAPARE - Transportation     Lack of Transportation (Medical): No     Lack of Transportation (Non-Medical):  No   Physical Activity: Not on file   Stress: Not on file   Social Connections: Not on file   Intimate Partner Violence: Not on file   Housing Stability: Low Risk  (12/7/2023)    Housing Stability Vital Sign     Unable to Pay for Housing in the Last Year: No     Number of Places Lived in the Last Year: 1     Unstable Housing in the Last Year: No       Subjective         Objective    Physical Exam:   Assessment Type: (P) During-treatment  General Appearance: (P) Awake, Lethargic  Respiratory Pattern: (P) Dyspnea with exertion  Chest Assessment: (P) Chest expansion symmetrical  Bilateral Breath Sounds: (P) Diminished  Cough: Productive, Congested  O2 Device: 3LPM NC    Vitals:  Blood pressure 141/63, pulse 73, temperature 98.2 °F (36.8 °C), temperature source Temporal, resp. rate (!) 27, height 5' 10" (1.778 m), weight 128 kg (282 lb 10.1 oz), SpO2 95 %. Imaging and other studies: I have personally reviewed pertinent reports. O2 Device: 3LPM NC     Plan    Respiratory Plan: No distress/Pulmonary history        Resp Comments: Pt stable on 3LPM NC, doing well with flutter valve with instruction. Pt instructed to cough and suctioned for moderate tenacious tan/yellow secretions.

## 2023-12-09 NOTE — PLAN OF CARE
Problem: Potential for Falls  Goal: Patient will remain free of falls  Description: INTERVENTIONS:  - Educate patient/family on patient safety including physical limitations  - Instruct patient to call for assistance with activity   - Consult OT/PT to assist with strengthening/mobility   - Keep Call bell within reach  - Keep bed low and locked with side rails adjusted as appropriate  - Keep care items and personal belongings within reach  - Initiate and maintain comfort rounds  - Make Fall Risk Sign visible to staff  - Offer Toileting every 2 Hours, in advance of need if unable to reposition self  - Initiate/Maintain bed/chair alarm  - Apply yellow socks and bracelet for high fall risk patients  - Consider moving patient to room near nurses station  Outcome: Progressing     Problem: PAIN - ADULT  Goal: Verbalizes/displays adequate comfort level or baseline comfort level  Description: Interventions:  - Encourage patient to monitor pain and request assistance  - Assess pain using appropriate pain scale  - Administer analgesics based on type and severity of pain and evaluate response  - Implement non-pharmacological measures as appropriate and evaluate response  - Consider cultural and social influences on pain and pain management  - Notify physician/advanced practitioner if interventions unsuccessful or patient reports new pain  Outcome: Progressing     Problem: INFECTION - ADULT  Goal: Absence or prevention of progression during hospitalization  Description: INTERVENTIONS:  - Assess and monitor for signs and symptoms of infection  - Monitor lab/diagnostic results  - Monitor all insertion sites, i.e. indwelling lines, tubes, and drains  - Monitor endotracheal if appropriate and nasal secretions for changes in amount and color  - [place] appropriate cooling/warming therapies per order  - Administer medications as ordered  - Instruct and encourage patient and family to use good hand hygiene technique  - Identify and instruct in appropriate isolation precautions for identified infection/condition  Outcome: Progressing     Problem: SAFETY ADULT  Goal: Patient will remain free of falls  Description: INTERVENTIONS:  - Educate patient/family on patient safety including physical limitations  - Instruct patient to call for assistance with activity   - Consult OT/PT to assist with strengthening/mobility   - Keep Call bell within reach  - Keep bed low and locked with side rails adjusted as appropriate  - Keep care items and personal belongings within reach  - Initiate and maintain comfort rounds  - Make Fall Risk Sign visible to staff  - Offer Toileting every 2 Hours, in advance of need if unable to reposition self  - Initiate/Maintain bed/chair alarm  - Apply yellow socks and bracelet for high fall risk patients  - Consider moving patient to room near nurses station  Outcome: Progressing  Goal: Maintain or return to baseline ADL function  Description: INTERVENTIONS:  -  Assess patient's ability to carry out ADLs; assess patient's baseline for ADL function and identify physical deficits which impact ability to perform ADLs (bathing, care of mouth/teeth, toileting, grooming, dressing, etc.)  - Assess/evaluate cause of self-care deficits   - Assess range of motion  - Assess patient's mobility; develop plan if impaired  - Assess patient's need for assistive devices and provide as appropriate  - Encourage maximum independence but intervene and supervise when necessary  - Involve family in performance of ADLs  - Assess for home care needs following discharge   - Consider OT consult to assist with ADL evaluation and planning for discharge  - Provide patient education as appropriate  Outcome: Progressing  Goal: Maintains/Returns to pre admission functional level  Description: INTERVENTIONS:  - Perform AM-PAC 6 Click Basic Mobility/ Daily Activity assessment daily.  - Set and communicate daily mobility goal to care team and patient/family/caregiver. - Collaborate with rehabilitation services on mobility goals if consulted  - Perform Range of Motion 3 times a day. - Reposition patient every 2 hours if pt unable to reposition self  - Record patient progress and toleration of activity level   Outcome: Progressing     Problem: DISCHARGE PLANNING  Goal: Discharge to home or other facility with appropriate resources  Description: INTERVENTIONS:  - Identify barriers to discharge w/patient and caregiver  - Arrange for needed discharge resources and transportation as appropriate  - Identify discharge learning needs (meds, wound care, etc.)  - Arrange for interpretive services to assist at discharge as needed  - Refer to Case Management Department for coordinating discharge planning if the patient needs post-hospital services based on physician/advanced practitioner order or complex needs related to functional status, cognitive ability, or social support system  Outcome: Progressing     Problem: Knowledge Deficit  Goal: Patient/family/caregiver demonstrates understanding of disease process, treatment plan, medications, and discharge instructions  Description: Complete learning assessment and assess knowledge base.   Interventions:  - Provide teaching at level of understanding  - Provide teaching via preferred learning methods  Outcome: Progressing     Problem: NEUROSENSORY - ADULT  Goal: Achieves stable or improved neurological status  Description: INTERVENTIONS  - Monitor and report changes in neurological status  - Monitor vital signs such as temperature, blood pressure, glucose, and any other labs ordered   - Initiate measures to prevent increased intracranial pressure  - Monitor for seizure activity and implement precautions if appropriate      Outcome: Progressing  Goal: Remains free of injury related to seizures activity  Description: INTERVENTIONS  - Maintain airway, patient safety  and administer oxygen as ordered  - Monitor patient for seizure activity, document and report duration and description of seizure to physician/advanced practitioner  - If seizure occurs,  ensure patient safety during seizure  - Reorient patient post seizure  - Seizure pads on all 4 side rails  - Instruct patient/family to notify RN of any seizure activity including if an aura is experienced  - Instruct patient/family to call for assistance with activity based on nursing assessment  - Administer anti-seizure medications if ordered    Outcome: Progressing  Goal: Achieves maximal functionality and self care  Description: INTERVENTIONS  - Monitor swallowing and airway patency with patient fatigue and changes in neurological status  - Encourage and assist patient to increase activity and self care. - Encourage visually impaired, hearing impaired and aphasic patients to use assistive/communication devices  Outcome: Progressing     Problem: Prexisting or High Potential for Compromised Skin Integrity  Goal: Skin integrity is maintained or improved  Description: INTERVENTIONS:  - Identify patients at risk for skin breakdown  - Assess and monitor skin integrity  - Assess and monitor nutrition and hydration status  - Monitor labs   - Assess for incontinence   - Turn and reposition patient  - Assist with mobility/ambulation  - Relieve pressure over bony prominences  - Avoid friction and shearing  - Provide appropriate hygiene as needed including keeping skin clean and dry  - Evaluate need for skin moisturizer/barrier cream  - Collaborate with interdisciplinary team   - Patient/family teaching  - Consider wound care consult   Outcome: Progressing     Problem: Nutrition/Hydration-ADULT  Goal: Nutrient/Hydration intake appropriate for improving, restoring or maintaining nutritional needs  Description: Monitor and assess patient's nutrition/hydration status for malnutrition. Collaborate with interdisciplinary team and initiate plan and interventions as ordered.   Monitor patient's weight and dietary intake as ordered or per policy. Utilize nutrition screening tool and intervene as necessary. Determine patient's food preferences and provide high-protein, high-caloric foods as appropriate.      INTERVENTIONS:  - Monitor oral intake, urinary output, labs, and treatment plans  - Assess nutrition and hydration status and recommend course of action  - Evaluate amount of meals eaten  - Assist patient with eating if necessary   - Allow adequate time for meals  - Recommend/ encourage appropriate diets, oral nutritional supplements, and vitamin/mineral supplements  - Order, calculate, and assess calorie counts as needed  - Recommend, monitor, and adjust tube feedings and TPN/PPN based on assessed needs  - Assess need for intravenous fluids  - Provide specific nutrition/hydration education as appropriate  - Include patient/family/caregiver in decisions related to nutrition  Outcome: Progressing

## 2023-12-09 NOTE — PLAN OF CARE
Problem: Potential for Falls  Goal: Patient will remain free of falls  Description: INTERVENTIONS:  - Educate patient/family on patient safety including physical limitations  - Instruct patient to call for assistance with activity   - Consult OT/PT to assist with strengthening/mobility   - Keep Call bell within reach  - Keep bed low and locked with side rails adjusted as appropriate  - Keep care items and personal belongings within reach  - Initiate and maintain comfort rounds  - Make Fall Risk Sign visible to staff  - Offer Toileting every 2 Hours, in advance of need if unable to reposition self  - Initiate/Maintain bed/chair alarm  - Apply yellow socks and bracelet for high fall risk patients  - Consider moving patient to room near nurses station  Outcome: Progressing     Problem: PAIN - ADULT  Goal: Verbalizes/displays adequate comfort level or baseline comfort level  Description: Interventions:  - Encourage patient to monitor pain and request assistance  - Assess pain using appropriate pain scale  - Administer analgesics based on type and severity of pain and evaluate response  - Implement non-pharmacological measures as appropriate and evaluate response  - Consider cultural and social influences on pain and pain management  - Notify physician/advanced practitioner if interventions unsuccessful or patient reports new pain  Outcome: Progressing     Problem: INFECTION - ADULT  Goal: Absence or prevention of progression during hospitalization  Description: INTERVENTIONS:  - Assess and monitor for signs and symptoms of infection  - Monitor lab/diagnostic results  - Monitor all insertion sites, i.e. indwelling lines, tubes, and drains  - Monitor endotracheal if appropriate and nasal secretions for changes in amount and color  - [place] appropriate cooling/warming therapies per order  - Administer medications as ordered  - Instruct and encourage patient and family to use good hand hygiene technique  - Identify and instruct in appropriate isolation precautions for identified infection/condition  Outcome: Progressing     Problem: SAFETY ADULT  Goal: Patient will remain free of falls  Description: INTERVENTIONS:  - Educate patient/family on patient safety including physical limitations  - Instruct patient to call for assistance with activity   - Consult OT/PT to assist with strengthening/mobility   - Keep Call bell within reach  - Keep bed low and locked with side rails adjusted as appropriate  - Keep care items and personal belongings within reach  - Initiate and maintain comfort rounds  - Make Fall Risk Sign visible to staff  - Offer Toileting every 2 Hours, in advance of need if unable to reposition self  - Initiate/Maintain bed/chair alarm  - Apply yellow socks and bracelet for high fall risk patients  - Consider moving patient to room near nurses station  Outcome: Progressing  Goal: Maintain or return to baseline ADL function  Description: INTERVENTIONS:  -  Assess patient's ability to carry out ADLs; assess patient's baseline for ADL function and identify physical deficits which impact ability to perform ADLs (bathing, care of mouth/teeth, toileting, grooming, dressing, etc.)  - Assess/evaluate cause of self-care deficits   - Assess range of motion  - Assess patient's mobility; develop plan if impaired  - Assess patient's need for assistive devices and provide as appropriate  - Encourage maximum independence but intervene and supervise when necessary  - Involve family in performance of ADLs  - Assess for home care needs following discharge   - Consider OT consult to assist with ADL evaluation and planning for discharge  - Provide patient education as appropriate  Outcome: Progressing  Goal: Maintains/Returns to pre admission functional level  Description: INTERVENTIONS:  - Perform AM-PAC 6 Click Basic Mobility/ Daily Activity assessment daily.  - Set and communicate daily mobility goal to care team and patient/family/caregiver. - Collaborate with rehabilitation services on mobility goals if consulted  - Perform Range of Motion 3 times a day. - Reposition patient every 2 hours if pt unable to reposition self  - Record patient progress and toleration of activity level   Outcome: Progressing     Problem: DISCHARGE PLANNING  Goal: Discharge to home or other facility with appropriate resources  Description: INTERVENTIONS:  - Identify barriers to discharge w/patient and caregiver  - Arrange for needed discharge resources and transportation as appropriate  - Identify discharge learning needs (meds, wound care, etc.)  - Arrange for interpretive services to assist at discharge as needed  - Refer to Case Management Department for coordinating discharge planning if the patient needs post-hospital services based on physician/advanced practitioner order or complex needs related to functional status, cognitive ability, or social support system  Outcome: Progressing     Problem: Knowledge Deficit  Goal: Patient/family/caregiver demonstrates understanding of disease process, treatment plan, medications, and discharge instructions  Description: Complete learning assessment and assess knowledge base.   Interventions:  - Provide teaching at level of understanding  - Provide teaching via preferred learning methods  Outcome: Progressing     Problem: NEUROSENSORY - ADULT  Goal: Achieves stable or improved neurological status  Description: INTERVENTIONS  - Monitor and report changes in neurological status  - Monitor vital signs such as temperature, blood pressure, glucose, and any other labs ordered   - Initiate measures to prevent increased intracranial pressure  - Monitor for seizure activity and implement precautions if appropriate      Outcome: Progressing  Goal: Remains free of injury related to seizures activity  Description: INTERVENTIONS  - Maintain airway, patient safety  and administer oxygen as ordered  - Monitor patient for seizure activity, document and report duration and description of seizure to physician/advanced practitioner  - If seizure occurs,  ensure patient safety during seizure  - Reorient patient post seizure  - Seizure pads on all 4 side rails  - Instruct patient/family to notify RN of any seizure activity including if an aura is experienced  - Instruct patient/family to call for assistance with activity based on nursing assessment  - Administer anti-seizure medications if ordered    Outcome: Progressing  Goal: Achieves maximal functionality and self care  Description: INTERVENTIONS  - Monitor swallowing and airway patency with patient fatigue and changes in neurological status  - Encourage and assist patient to increase activity and self care. - Encourage visually impaired, hearing impaired and aphasic patients to use assistive/communication devices  Outcome: Progressing     Problem: Prexisting or High Potential for Compromised Skin Integrity  Goal: Skin integrity is maintained or improved  Description: INTERVENTIONS:  - Identify patients at risk for skin breakdown  - Assess and monitor skin integrity  - Assess and monitor nutrition and hydration status  - Monitor labs   - Assess for incontinence   - Turn and reposition patient  - Assist with mobility/ambulation  - Relieve pressure over bony prominences  - Avoid friction and shearing  - Provide appropriate hygiene as needed including keeping skin clean and dry  - Evaluate need for skin moisturizer/barrier cream  - Collaborate with interdisciplinary team   - Patient/family teaching  - Consider wound care consult   Outcome: Progressing     Problem: Nutrition/Hydration-ADULT  Goal: Nutrient/Hydration intake appropriate for improving, restoring or maintaining nutritional needs  Description: Monitor and assess patient's nutrition/hydration status for malnutrition. Collaborate with interdisciplinary team and initiate plan and interventions as ordered.   Monitor patient's weight and dietary intake as ordered or per policy. Utilize nutrition screening tool and intervene as necessary. Determine patient's food preferences and provide high-protein, high-caloric foods as appropriate.      INTERVENTIONS:  - Monitor oral intake, urinary output, labs, and treatment plans  - Assess nutrition and hydration status and recommend course of action  - Evaluate amount of meals eaten  - Assist patient with eating if necessary   - Allow adequate time for meals  - Recommend/ encourage appropriate diets, oral nutritional supplements, and vitamin/mineral supplements  - Order, calculate, and assess calorie counts as needed  - Recommend, monitor, and adjust tube feedings and TPN/PPN based on assessed needs  - Assess need for intravenous fluids  - Provide specific nutrition/hydration education as appropriate  - Include patient/family/caregiver in decisions related to nutrition  Outcome: Progressing

## 2023-12-09 NOTE — INCIDENTAL FINDINGS
The following findings require follow up:  Radiographic finding   Findin. 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 Recommendation is for follow-up low radiation dose chest CT without contrast in one year. 2. Two nonobstructing calculi in the distal right ureter, the more cephalad calculus measuring at least 1 cm, the more inferior calculus measures about 6 mm. No hydroureter or hydronephrosis. Follow up required:   Recommendation is for follow-up low radiation dose chest CT without contrast in one year.   See PCP   Follow up should be done within 4 week(s)    Please notify the following clinician to assist with the follow up:   Dr. Radha Dean PA-C

## 2023-12-09 NOTE — ASSESSMENT & PLAN NOTE
SLP following  S/p VBS 12/8- Cleared for pureed diet with HTL  Continue to monitor for risk of aspiration

## 2023-12-09 NOTE — PROGRESS NOTES
Critical Care Interval Transfer Note:    Please refer to progress note from earlier today for full details. Barriers to discharge:   Hypertension- Patient weaned off cardene gtt today. Continue Norvasc 10mg (Increased from home dose of 5mg), Losartan 25mg, and Lopressor 50mg Q12. He is also receiving PRN Hydralazine- May need further titration of antihypertensive agents. Not on home lisinopril (Can restart)  PT/OT to evaluate for rehab needs/placement   Monitor respiratory status given concern for possible aspiration  Patient cleared by SLP following VBS for Dysphagia 1 diet/HTL  Patient having mildly increased 02 requirements (currently on 3L)- CXR without evidence of PNA. Urinary retention- [place] placed 12/8. Plan for voiding trial on 12/10  Continue flomax     Consults: IP CONSULT TO NEUROLOGY  IP CONSULT TO NEUROLOGY  IP CONSULT TO PHYSICAL MEDICINE REHAB  IP CONSULT TO CASE MANAGEMENT    Recommended to review admission imaging for incidental findings and document in discharge navigator: Incidental findings have been documented in discharge navigator. Patient and/or family was informed and they expressed understanding. Discharge Plan: Anticipate discharge in 48-72 hrs to TBD  [place] Plan:  Plan for voiding trial after 48h (Plan to remove on 12/10)            Patient seen and evaluated by Critical Care today and deemed to be appropriate for transfer to Med Surg. Spoke to Dr. Artie Hernandez from [facility] to accept transfer. Critical care can be contacted via [employer] with any questions or concerns.     Corey Barnett PA-C

## 2023-12-10 PROBLEM — R78.81 POSITIVE BLOOD CULTURE: Status: ACTIVE | Noted: 2023-12-10

## 2023-12-10 LAB
ANION GAP SERPL CALCULATED.3IONS-SCNC: 6 MMOL/L
ATRIAL RATE: 122 BPM
BACTERIA BLD CULT: ABNORMAL
BASOPHILS # BLD AUTO: 0.07 THOUSANDS/ÂΜL (ref 0–0.1)
BASOPHILS NFR BLD AUTO: 1 % (ref 0–1)
BUN SERPL-MCNC: 25 MG/DL (ref 5–25)
CALCIUM SERPL-MCNC: 8.5 MG/DL (ref 8.4–10.2)
CHLORIDE SERPL-SCNC: 110 MMOL/L (ref 96–108)
CO2 SERPL-SCNC: 25 MMOL/L (ref 21–32)
CREAT SERPL-MCNC: 1.32 MG/DL (ref 0.6–1.3)
EOSINOPHIL # BLD AUTO: 0.13 THOUSAND/ÂΜL (ref 0–0.61)
EOSINOPHIL NFR BLD AUTO: 1 % (ref 0–6)
ERYTHROCYTE [DISTWIDTH] IN BLOOD BY AUTOMATED COUNT: 15.9 % (ref 11.6–15.1)
GFR SERPL CREATININE-BSD FRML MDRD: 48 ML/MIN/1.73SQ M
GLUCOSE SERPL-MCNC: 108 MG/DL (ref 65–140)
GLUCOSE SERPL-MCNC: 115 MG/DL (ref 65–140)
GLUCOSE SERPL-MCNC: 124 MG/DL (ref 65–140)
GLUCOSE SERPL-MCNC: 161 MG/DL (ref 65–140)
GLUCOSE SERPL-MCNC: 99 MG/DL (ref 65–140)
GRAM STN SPEC: ABNORMAL
HCT VFR BLD AUTO: 43.2 % (ref 36.5–49.3)
HGB BLD-MCNC: 13.1 G/DL (ref 12–17)
IMM GRANULOCYTES # BLD AUTO: 0.05 THOUSAND/UL (ref 0–0.2)
IMM GRANULOCYTES NFR BLD AUTO: 0 % (ref 0–2)
LYMPHOCYTES # BLD AUTO: 2.9 THOUSANDS/ÂΜL (ref 0.6–4.47)
LYMPHOCYTES NFR BLD AUTO: 24 % (ref 14–44)
MAGNESIUM SERPL-MCNC: 2.1 MG/DL (ref 1.9–2.7)
MCH RBC QN AUTO: 26.6 PG (ref 26.8–34.3)
MCHC RBC AUTO-ENTMCNC: 30.3 G/DL (ref 31.4–37.4)
MCV RBC AUTO: 88 FL (ref 82–98)
MONOCYTES # BLD AUTO: 1.1 THOUSAND/ÂΜL (ref 0.17–1.22)
MONOCYTES NFR BLD AUTO: 9 % (ref 4–12)
NEUTROPHILS # BLD AUTO: 7.99 THOUSANDS/ÂΜL (ref 1.85–7.62)
NEUTS SEG NFR BLD AUTO: 65 % (ref 43–75)
NRBC BLD AUTO-RTO: 0 /100 WBCS
PHOSPHATE SERPL-MCNC: 2.9 MG/DL (ref 2.3–4.1)
PLATELET # BLD AUTO: 228 THOUSANDS/UL (ref 149–390)
PMV BLD AUTO: 9.8 FL (ref 8.9–12.7)
POTASSIUM SERPL-SCNC: 3.9 MMOL/L (ref 3.5–5.3)
PROCALCITONIN SERPL-MCNC: 0.08 NG/ML
QRS AXIS: -71 DEGREES
QRSD INTERVAL: 136 MS
QT INTERVAL: 362 MS
QTC INTERVAL: 503 MS
RBC # BLD AUTO: 4.92 MILLION/UL (ref 3.88–5.62)
SODIUM SERPL-SCNC: 141 MMOL/L (ref 135–147)
STREPTOCOCCUS DNA BLD POS NAA+NON-PROBE: DETECTED
T WAVE AXIS: 33 DEGREES
VENTRICULAR RATE: 116 BPM
WBC # BLD AUTO: 12.24 THOUSAND/UL (ref 4.31–10.16)

## 2023-12-10 PROCEDURE — 94760 N-INVAS EAR/PLS OXIMETRY 1: CPT

## 2023-12-10 PROCEDURE — 84100 ASSAY OF PHOSPHORUS: CPT | Performed by: NURSE PRACTITIONER

## 2023-12-10 PROCEDURE — 87040 BLOOD CULTURE FOR BACTERIA: CPT

## 2023-12-10 PROCEDURE — 94660 CPAP INITIATION&MGMT: CPT

## 2023-12-10 PROCEDURE — 83735 ASSAY OF MAGNESIUM: CPT | Performed by: NURSE PRACTITIONER

## 2023-12-10 PROCEDURE — 84145 PROCALCITONIN (PCT): CPT

## 2023-12-10 PROCEDURE — 85025 COMPLETE CBC W/AUTO DIFF WBC: CPT | Performed by: NURSE PRACTITIONER

## 2023-12-10 PROCEDURE — 92526 ORAL FUNCTION THERAPY: CPT

## 2023-12-10 PROCEDURE — 94664 DEMO&/EVAL PT USE INHALER: CPT

## 2023-12-10 PROCEDURE — 94668 MNPJ CHEST WALL SBSQ: CPT

## 2023-12-10 PROCEDURE — 80048 BASIC METABOLIC PNL TOTAL CA: CPT | Performed by: NURSE PRACTITIONER

## 2023-12-10 PROCEDURE — 99232 SBSQ HOSP IP/OBS MODERATE 35: CPT

## 2023-12-10 PROCEDURE — 82948 REAGENT STRIP/BLOOD GLUCOSE: CPT

## 2023-12-10 PROCEDURE — C9113 INJ PANTOPRAZOLE SODIUM, VIA: HCPCS | Performed by: PHYSICIAN ASSISTANT

## 2023-12-10 RX ORDER — LOSARTAN POTASSIUM 25 MG/1
25 TABLET ORAL DAILY
Status: DISCONTINUED | OUTPATIENT
Start: 2023-12-11 | End: 2023-12-11

## 2023-12-10 RX ORDER — LOSARTAN POTASSIUM 50 MG/1
50 TABLET ORAL DAILY
Status: DISCONTINUED | OUTPATIENT
Start: 2023-12-11 | End: 2023-12-10

## 2023-12-10 RX ORDER — LOSARTAN POTASSIUM 25 MG/1
25 TABLET ORAL ONCE
Status: DISCONTINUED | OUTPATIENT
Start: 2023-12-10 | End: 2023-12-10

## 2023-12-10 RX ADMIN — HEPARIN SODIUM 5000 UNITS: 5000 INJECTION INTRAVENOUS; SUBCUTANEOUS at 09:15

## 2023-12-10 RX ADMIN — AMLODIPINE BESYLATE 10 MG: 10 TABLET ORAL at 09:15

## 2023-12-10 RX ADMIN — PANTOPRAZOLE SODIUM 40 MG: 40 INJECTION, POWDER, FOR SOLUTION INTRAVENOUS at 05:04

## 2023-12-10 RX ADMIN — METOPROLOL TARTRATE 50 MG: 50 TABLET, FILM COATED ORAL at 20:42

## 2023-12-10 RX ADMIN — HEPARIN SODIUM 5000 UNITS: 5000 INJECTION INTRAVENOUS; SUBCUTANEOUS at 20:42

## 2023-12-10 RX ADMIN — LEVOTHYROXINE SODIUM 200 MCG: 100 TABLET ORAL at 05:04

## 2023-12-10 RX ADMIN — LOSARTAN POTASSIUM 25 MG: 25 TABLET, FILM COATED ORAL at 09:15

## 2023-12-10 RX ADMIN — METOPROLOL TARTRATE 50 MG: 50 TABLET, FILM COATED ORAL at 09:15

## 2023-12-10 RX ADMIN — LIDOCAINE 5% 2 PATCH: 700 PATCH TOPICAL at 20:43

## 2023-12-10 NOTE — PROGRESS NOTES
[address] [facility]  Progress Note  Name: Kim Kimble  MRN: [id number]  Unit/Bed#: -01 I Date of Admission: 12/6/2023   Date of Service: 12/10/2023 I Hospital Day: 4    Assessment/Plan   * Intraparenchymal hemorrhage of brain [facility])  Assessment & Plan  Patient is a 78-year-old male with medical history pertinent for hypertension diabetes GERD and BPH presenting with dizziness, increasing weakness and garbled speech. Last seen well 12/5/23 11AM, refused to go to hospital until he fell 12/6/23 w/o injury. Recently tx w/azithro outpt for bronchitis. ER stroke alert called-pressure was 191/89 with aspirin yesterday NIH score of 2 and her CT revealed a small acute left basal ganglia and lateral midbrain IPH mild vasogenic edema. Repeat CT head 1 & 6h without interval IPH changes. No LVO spot signs, no vascular malformations  Repeat CT of head prn for new or worsening neuro deficits  BP goal 120-160  Cardene drip stopped 12/9  Currently on lopressor BID, Norvasc, Losartan, and PRN hydralazine 10mg IV  Elevate HOB 30+ degrees  Neurology is following  MRI 12/7: Stable left thalamic hemorrhage and surrounding vasogenic edema. Foci of chronic microhemorrhage in the left thalamus and left temporal subcortical white matter. 2. No evidence of solid lesion underlying the left thalamic hemorrhage. Consider outpatient MRI in 8 to 12 weeks  Started DVT prophylaxis 12/9/23  PT/OT consulted    Positive blood culture  Assessment & Plan  Patient with positive blood culture 1/2 on 12/6 and 1/2 repeated on 12/8. BC from 12/6 grew: Streptococcus mitis oralis susceptible to rocephin  BC from 12/8 currently growing gram positive rods, blood culture identification panel unable to detect organism  Will repeat blood cultures and reach out to ID regarding BC results. Patient currently not on antibiotics, does have mild leukocytosis.  Procal normal    Dysphagia  Assessment & Plan  SLP following  S/p VBS 12/8- Cleared for pureed diet with HTL  Continue to monitor for risk of aspiration     Tachypnea  Assessment & Plan  Tachypnea into 20's-30's on arrival, sats >92% throughout hospital stay, RA->3L->2L currently. May be secondary to acute stress and body habitus. Continue and wean as tolerated, sat goal >94% to ensure optimal brain oxygenation  Hx BASILIA, CPAP qhs    Stage 3a chronic kidney disease [facility])  Assessment & Plan  Lab Results   Component Value Date    EGFR 48 12/10/2023    EGFR 50 12/09/2023    EGFR 49 12/08/2023    CREATININE 1.32 (H) 12/10/2023    CREATININE 1.28 12/09/2023    CREATININE 1.31 (H) 12/08/2023     His baseline creatinine is 1.4-1.5 at baseline  Avoid nephrotoxic medications, nsaids, hypotension  Monitor I&Os    Benign prostatic hyperplasia without urinary obstruction  Assessment & Plan  Continue Flomax 0.4 mg p.o. With carbajal in place, will discuss with urology when patient should have a voiding trial.     Class 3 severe obesity with body mass index (BMI) of 40.0 to 44.9 in adult [facility])  Assessment & Plan  Dietary and lifestyle counseling recommended prior to discharge     Hypertension  Assessment & Plan  Weaned off Cardene 12/9/23  Continue BB, Losartan  Home Norvasc increased to 10mg  May need to increase losartan to keep BP within goal.   Goal -160  Hydralazine PRN     Diabetes mellitus [facility])  Assessment & Plan  Lab Results   Component Value Date    HGBA1C 6.5 (H) 05/11/2023       Recent Labs     12/06/23  2051 12/06/23  2350 12/07/23  0542 12/07/23  1147   POCGLU 118 120 111 114       Blood Sugar Average: Last 72 hrs:  (P) 119.2    Insulin sliding scale algorithm 3  Holding home Metformin  Monitor BG            VTE Pharmacologic Prophylaxis:   High Risk (Score >/= 5) - Pharmacological DVT Prophylaxis Ordered: heparin. Sequential Compression Devices Ordered.     Mobility:   Basic Mobility Inpatient Raw Score: 12  -HLM Goal: 4: Move to chair/commode  -HL Achieved: 3: Sit at edge of bed  HLM Goal NOT achieved. Continue with multidisciplinary rounding and encourage appropriate mobility to improve upon [facility] goals. Patient Centered Rounds: I performed bedside rounds with nursing staff today. Discussions with Specialists or Other Care Team Provider: nursing, case management    Education and Discussions with Family / Patient: Attempted to update  (son) via phone. Left voicemail. Total Time Spent on Date of Encounter in care of patient: 45 mins. This time was spent on one or more of the following: performing physical exam; counseling and coordination of care; obtaining or reviewing history; documenting in the medical record; reviewing/ordering tests, medications or procedures; communicating with other healthcare professionals and discussing with patient's family/caregivers. Current Length of Stay: 4 day(s)  Current Patient Status: Inpatient   Certification Statement: The patient will continue to require additional inpatient hospital stay due to PT/OT evaluations, voiding trial, monitoring BP, repeat [facility]  Discharge Plan: Anticipate discharge in 48-72 hrs to discharge location to be determined pending rehab evaluations. Code Status: Level 1 - Full Code    Subjective:   Patient seen and examined this morning. He states that he is feeling good. Denies nausea, vomiting, diarrhea, constipation, abdominal pain, CP, SOB, fever, chills. States his breakfast was good. Objective:     Vitals:   Temp (24hrs), Av.9 °F (36.6 °C), Min:97.4 °F (36.3 °C), Max:98.1 °F (36.7 °C)    Temp:  [97.4 °F (36.3 °C)-98.1 °F (36.7 °C)] 98.1 °F (36.7 °C)  HR:  [48-69] 48  Resp:  [16-24] 16  BP: (136-159)/(60-71) 136/60  SpO2:  [92 %-97 %] 97 %  Body mass index is 40.55 kg/m². Input and Output Summary (last 24 hours):      Intake/Output Summary (Last 24 hours) at 12/10/2023 1356  Last data filed at 12/10/2023 1300  Gross per 24 hour   Intake 210 ml   Output 650 ml   Net -440 ml       Physical Exam:   Physical Exam  Vitals reviewed. Constitutional:       General: He is not in acute distress. Appearance: He is obese. He is ill-appearing. He is not toxic-appearing. HENT:      Head: Normocephalic and atraumatic. Mouth/Throat:      Mouth: Mucous membranes are moist.   Cardiovascular:      Rate and Rhythm: Normal rate and regular rhythm. Heart sounds: No murmur heard. Pulmonary:      Effort: No respiratory distress. Breath sounds: No stridor. No wheezing. Comments: Saturating around 95% on 3L NC. Patient snoring loudly upon entering, wakes easily. Abdominal:      General: Bowel sounds are normal. There is no distension. Palpations: Abdomen is soft. There is no mass. Tenderness: There is no abdominal tenderness. Genitourinary:     Comments: [place] with clear yellow urine output  Musculoskeletal:      Right lower leg: No edema. Left lower leg: No edema. Skin:     General: Skin is warm and dry. Neurological:      Mental Status: He is alert and oriented to person, place, and time. Cranial Nerves: Dysarthria and facial asymmetry present. Motor: Weakness (right upper extremity slightly weaker than left upper extremity) present.    Psychiatric:         Mood and Affect: Mood normal.         Behavior: Behavior normal.          Additional Data:     Labs:  Results from last 7 days   Lab Units 12/10/23  0452   WBC Thousand/uL 12.24*   HEMOGLOBIN g/dL 13.1   HEMATOCRIT % 43.2   PLATELETS Thousands/uL 228   NEUTROS PCT % 65   LYMPHS PCT % 24   MONOS PCT % 9   EOS PCT % 1     Results from last 7 days   Lab Units 12/10/23  0452 12/07/23  0438 12/06/23  1756   SODIUM mmol/L 141   < > 139   POTASSIUM mmol/L 3.9   < > 4.1   CHLORIDE mmol/L 110*   < > 104   CO2 mmol/L 25   < > 29   BUN mg/dL 25   < > 23   CREATININE mg/dL 1.32*   < > 1.40*   ANION GAP mmol/L 6   < > 6   CALCIUM mg/dL 8.5   < > 9.2   ALBUMIN g/dL  --   --  4.2   TOTAL BILIRUBIN mg/dL  --   --  0.61   ALK PHOS U/L  --   --  55   ALT U/L  --   --  16   AST U/L  --   --  18   GLUCOSE RANDOM mg/dL 115   < > 128    < > = values in this interval not displayed. Results from last 7 days   Lab Units 12/06/23  1756   INR  0.98     Results from last 7 days   Lab Units 12/10/23  1115 12/10/23  0727 12/09/23  2115 12/09/23  1624 12/09/23  1128 12/09/23  0721 12/08/23  1728 12/08/23  1310 12/08/23  0605 12/08/23  0052 12/07/23  1736 12/07/23  1147   POC GLUCOSE mg/dl 161* 99 114 99 129 118 169* 127 113 92 107 114     Results from last 7 days   Lab Units 12/07/23  0438   HEMOGLOBIN A1C % 6.3*     Results from last 7 days   Lab Units 12/10/23  0452 12/06/23  1756   LACTIC ACID mmol/L  --  1.1   PROCALCITONIN ng/ml 0.08 <0.05       Lines/Drains:  Invasive Devices       Peripheral Intravenous Line  Duration             Peripheral IV 12/10/23 Dorsal (posterior); Right Hand <1 day              Drain  Duration             Urethral Catheter 16 Fr. 2 days                  Urinary Catheter:  Goal for removal:  will discuss with urology regarding carbajal removal               Imaging: Personally reviewed the following imaging: chest xray    Recent Cultures (last 7 days):   Results from last 7 days   Lab Units 12/08/23  0601 12/06/23  1758 12/06/23  1756   BLOOD CULTURE  Bacillus species NOT anthracis*  No Growth at 24 hrs. No Growth at 72 hrs.  Streptococcus mitis oralis group*   GRAM STAIN RESULT  Gram positive rods*  --  Gram positive cocci in pairs and chains*       Last 24 Hours Medication List:   Current Facility-Administered Medications   Medication Dose Route Frequency Provider Last Rate    albuterol  2 puff Inhalation Q6H PRN Daren Blow, PA-C      amLODIPine  10 mg Oral Daily Daren Blow, PA-C      bisacodyl  10 mg Rectal PRN Daren Blow, PA-C      heparin (porcine)  5,000 Units Subcutaneous Q12H [address] [place] Kenji Carla PA-C      hydrALAZINE  10 mg Intravenous Q4H PRN Daren Blow, PA-C      insulin lispro  1-6 Units Subcutaneous 4x Daily (AC & HS) Chely Dewitt PA-C      levothyroxine  200 mcg Oral Early Morning Chely Dewitt PA-C      lidocaine  2 patch Topical Q24H Brisa Geiger      [START ON 12/11/2023] losartan  25 mg Oral Daily Veronica Sean Hopkins PA-C      metoprolol tartrate  50 mg Oral Q12H [address] [place] Kenjijazmyne Ruffin PA-C      ondansetron  4 mg Intravenous Q6H PRN Chely Dewitt PA-C      pantoprazole  40 mg Intravenous QAM Chely Dewitt PA-C      pneumococcal 20-concha conj vacc  0.5 mL Intramuscular Once PRN Chely Dewitt PA-C          Today, Patient Was Seen By: Demarco Garcia PA-C    **Please Note: This note may have been constructed using a voice recognition system. **

## 2023-12-10 NOTE — ASSESSMENT & PLAN NOTE
Patient with positive blood culture 1/2 on 12/6 and 1/2 repeated on 12/8. BC from 12/6 grew: Streptococcus mitis oralis susceptible to rocephin  BC from 12/8 currently growing gram positive rods, blood culture identification panel unable to detect organism  Will repeat blood cultures and reach out to ID regarding BC results. Patient currently not on antibiotics, does have mild leukocytosis.  Procal normal

## 2023-12-10 NOTE — PLAN OF CARE
Problem: Potential for Falls  Goal: Patient will remain free of falls  Description: INTERVENTIONS:  - Educate patient/family on patient safety including physical limitations  - Instruct patient to call for assistance with activity   - Consult OT/PT to assist with strengthening/mobility   - Keep Call bell within reach  - Keep bed low and locked with side rails adjusted as appropriate  - Keep care items and personal belongings within reach  - Initiate and maintain comfort rounds  - Make Fall Risk Sign visible to staff  - Offer Toileting every 2 Hours, in advance of need if unable to reposition self  - Initiate/Maintain bed/chair alarm  - Apply yellow socks and bracelet for high fall risk patients  - Consider moving patient to room near nurses station  Outcome: Progressing     Problem: PAIN - ADULT  Goal: Verbalizes/displays adequate comfort level or baseline comfort level  Description: Interventions:  - Encourage patient to monitor pain and request assistance  - Assess pain using appropriate pain scale  - Administer analgesics based on type and severity of pain and evaluate response  - Implement non-pharmacological measures as appropriate and evaluate response  - Consider cultural and social influences on pain and pain management  - Notify physician/advanced practitioner if interventions unsuccessful or patient reports new pain  Outcome: Progressing     Problem: INFECTION - ADULT  Goal: Absence or prevention of progression during hospitalization  Description: INTERVENTIONS:  - Assess and monitor for signs and symptoms of infection  - Monitor lab/diagnostic results  - Monitor all insertion sites, i.e. indwelling lines, tubes, and drains  - Monitor endotracheal if appropriate and nasal secretions for changes in amount and color  - [place] appropriate cooling/warming therapies per order  - Administer medications as ordered  - Instruct and encourage patient and family to use good hand hygiene technique  - Identify and instruct in appropriate isolation precautions for identified infection/condition  Outcome: Progressing     Problem: SAFETY ADULT  Goal: Patient will remain free of falls  Description: INTERVENTIONS:  - Educate patient/family on patient safety including physical limitations  - Instruct patient to call for assistance with activity   - Consult OT/PT to assist with strengthening/mobility   - Keep Call bell within reach  - Keep bed low and locked with side rails adjusted as appropriate  - Keep care items and personal belongings within reach  - Initiate and maintain comfort rounds  - Make Fall Risk Sign visible to staff  - Offer Toileting every 2 Hours, in advance of need if unable to reposition self  - Initiate/Maintain bed/chair alarm  - Apply yellow socks and bracelet for high fall risk patients  - Consider moving patient to room near nurses station  Outcome: Progressing  Goal: Maintain or return to baseline ADL function  Description: INTERVENTIONS:  -  Assess patient's ability to carry out ADLs; assess patient's baseline for ADL function and identify physical deficits which impact ability to perform ADLs (bathing, care of mouth/teeth, toileting, grooming, dressing, etc.)  - Assess/evaluate cause of self-care deficits   - Assess range of motion  - Assess patient's mobility; develop plan if impaired  - Assess patient's need for assistive devices and provide as appropriate  - Encourage maximum independence but intervene and supervise when necessary  - Involve family in performance of ADLs  - Assess for home care needs following discharge   - Consider OT consult to assist with ADL evaluation and planning for discharge  - Provide patient education as appropriate  Outcome: Progressing  Goal: Maintains/Returns to pre admission functional level  Description: INTERVENTIONS:  - Perform AM-PAC 6 Click Basic Mobility/ Daily Activity assessment daily.  - Set and communicate daily mobility goal to care team and patient/family/caregiver. - Collaborate with rehabilitation services on mobility goals if consulted  - Perform Range of Motion 3 times a day. - Reposition patient every 2 hours if pt unable to reposition self  - Record patient progress and toleration of activity level   Outcome: Progressing     Problem: DISCHARGE PLANNING  Goal: Discharge to home or other facility with appropriate resources  Description: INTERVENTIONS:  - Identify barriers to discharge w/patient and caregiver  - Arrange for needed discharge resources and transportation as appropriate  - Identify discharge learning needs (meds, wound care, etc.)  - Arrange for interpretive services to assist at discharge as needed  - Refer to Case Management Department for coordinating discharge planning if the patient needs post-hospital services based on physician/advanced practitioner order or complex needs related to functional status, cognitive ability, or social support system  Outcome: Progressing     Problem: Knowledge Deficit  Goal: Patient/family/caregiver demonstrates understanding of disease process, treatment plan, medications, and discharge instructions  Description: Complete learning assessment and assess knowledge base.   Interventions:  - Provide teaching at level of understanding  - Provide teaching via preferred learning methods  Outcome: Progressing     Problem: NEUROSENSORY - ADULT  Goal: Achieves stable or improved neurological status  Description: INTERVENTIONS  - Monitor and report changes in neurological status  - Monitor vital signs such as temperature, blood pressure, glucose, and any other labs ordered   - Initiate measures to prevent increased intracranial pressure  - Monitor for seizure activity and implement precautions if appropriate      Outcome: Progressing  Goal: Remains free of injury related to seizures activity  Description: INTERVENTIONS  - Maintain airway, patient safety  and administer oxygen as ordered  - Monitor patient for seizure activity, document and report duration and description of seizure to physician/advanced practitioner  - If seizure occurs,  ensure patient safety during seizure  - Reorient patient post seizure  - Seizure pads on all 4 side rails  - Instruct patient/family to notify RN of any seizure activity including if an aura is experienced  - Instruct patient/family to call for assistance with activity based on nursing assessment  - Administer anti-seizure medications if ordered    Outcome: Progressing  Goal: Achieves maximal functionality and self care  Description: INTERVENTIONS  - Monitor swallowing and airway patency with patient fatigue and changes in neurological status  - Encourage and assist patient to increase activity and self care. - Encourage visually impaired, hearing impaired and aphasic patients to use assistive/communication devices  Outcome: Progressing     Problem: Prexisting or High Potential for Compromised Skin Integrity  Goal: Skin integrity is maintained or improved  Description: INTERVENTIONS:  - Identify patients at risk for skin breakdown  - Assess and monitor skin integrity  - Assess and monitor nutrition and hydration status  - Monitor labs   - Assess for incontinence   - Turn and reposition patient  - Assist with mobility/ambulation  - Relieve pressure over bony prominences  - Avoid friction and shearing  - Provide appropriate hygiene as needed including keeping skin clean and dry  - Evaluate need for skin moisturizer/barrier cream  - Collaborate with interdisciplinary team   - Patient/family teaching  - Consider wound care consult   Outcome: Progressing     Problem: Nutrition/Hydration-ADULT  Goal: Nutrient/Hydration intake appropriate for improving, restoring or maintaining nutritional needs  Description: Monitor and assess patient's nutrition/hydration status for malnutrition. Collaborate with interdisciplinary team and initiate plan and interventions as ordered.   Monitor patient's weight and dietary intake as ordered or per policy. Utilize nutrition screening tool and intervene as necessary. Determine patient's food preferences and provide high-protein, high-caloric foods as appropriate.      INTERVENTIONS:  - Monitor oral intake, urinary output, labs, and treatment plans  - Assess nutrition and hydration status and recommend course of action  - Evaluate amount of meals eaten  - Assist patient with eating if necessary   - Allow adequate time for meals  - Recommend/ encourage appropriate diets, oral nutritional supplements, and vitamin/mineral supplements  - Order, calculate, and assess calorie counts as needed  - Recommend, monitor, and adjust tube feedings and TPN/PPN based on assessed needs  - Assess need for intravenous fluids  - Provide specific nutrition/hydration education as appropriate  - Include patient/family/caregiver in decisions related to nutrition  Outcome: Progressing

## 2023-12-10 NOTE — QUICK NOTE
Updated patient's son at bedside. Discussed treatment plan currently and clinical status. Answered questions to his satisfaction. No further questions from patient's family at this time.      Elisa Jaeger, EBENEZER

## 2023-12-10 NOTE — SPEECH THERAPY NOTE
Bedside Dysphagia Treatment    Active on ST caseload since 12/7/2023. VFSS completed 12/8/23. Diet recommendation Level 1 puree with honey thick liquids. Summary results included below. Pt seen for f/u dysphagia treatment post swallow study. Since study pt has been transitioned from ICU to med surg. RN reporting tolerance of current diet. Treatment session completed breakfast tray + additional trials. Pt self fed following set up. Determined effective dysphagia strategies post observation of tolerance of few trials and instructed pt-small bites/sips and slow rate for swallow prior to next trial. Also suggested no straw usage to assist with oral control and bolus monitoring. Following instruction pt implemented strategies @ a supervision level. Did review frequently to facilitate within and across session recall. Cognitive deficits are evident, oriented to self only initially-recalled location and month @ end following review. Tolerance of puree was grossly functional. No episodes of difficulty present or reported. Completed 4 trials of mech soft. Mastication mildly prolonged (pt is edentulous) with slight difficulty with manipulation of bolus, suspect d/t macroglossia. Swallow appeared mildly delayed. Weak delayed cough on 1/4, unknown if related to swallow or general. Pt did require min A for usage of strategies as attempted to consume multiple prior to swallow of initial. Will require additional trialing prior to diet upgrade. Honey thick liquids via cup yielded no overt s/s of aspiration. >15 consumed. Held on upgraded d/t results from VFSS, silent aspiration c thin and NT. Recommend diet of puree and HTL via cup and continuation of ST services. If discharged, ST services will be necessary for dysphagia, cognition, and speech/language. Potential candidate for NMES treatment, suggest discharge to location where SLP is certified to assist with improvement of swallow.        DIET RECOMMENDATION- Puree with Honey thick liquids via cup  Strategies-slow rate, small bites/sips, no straws      VFSS results from 12/8/2023-Pt presents with moderate-severe oropharyngeal dysphagia characterized by impaired mastication, impaired BOT strength, severely impaired hyolaryngeal elevation/excursion, absent epiglottic inversion. Aspiration described as above, occurred w/ thin liquids on initial swallow, occurred w/ nectar thick between first and second swallow on residue from valleculae. Mild-moderate vallecular residual w/ honey thick by spoon and cup, secondary swallow utilized w/ all trials assisted w/ clearance. No significant pyriform residue. Barium pill trialed whole in puree. Lodged in vallecular space for ~10 mins, suspect d/t absent epiglottic inversion. Trialed additional puree and HTL/NTL washes, head positioning, hard swallow.  Pill eventually cleared w/ laryngeal massage from SLP

## 2023-12-10 NOTE — PLAN OF CARE
Problem: Potential for Falls  Goal: Patient will remain free of falls  Description: INTERVENTIONS:  - Educate patient/family on patient safety including physical limitations  - Instruct patient to call for assistance with activity   - Consult OT/PT to assist with strengthening/mobility   - Keep Call bell within reach  - Keep bed low and locked with side rails adjusted as appropriate  - Keep care items and personal belongings within reach  - Initiate and maintain comfort rounds  - Make Fall Risk Sign visible to staff  - Offer Toileting every 2 Hours, in advance of need if unable to reposition self  - Initiate/Maintain bed/chair alarm  - Apply yellow socks and bracelet for high fall risk patients  - Consider moving patient to room near nurses station  Outcome: Progressing     Problem: PAIN - ADULT  Goal: Verbalizes/displays adequate comfort level or baseline comfort level  Description: Interventions:  - Encourage patient to monitor pain and request assistance  - Assess pain using appropriate pain scale  - Administer analgesics based on type and severity of pain and evaluate response  - Implement non-pharmacological measures as appropriate and evaluate response  - Consider cultural and social influences on pain and pain management  - Notify physician/advanced practitioner if interventions unsuccessful or patient reports new pain  Outcome: Progressing     Problem: INFECTION - ADULT  Goal: Absence or prevention of progression during hospitalization  Description: INTERVENTIONS:  - Assess and monitor for signs and symptoms of infection  - Monitor lab/diagnostic results  - Monitor all insertion sites, i.e. indwelling lines, tubes, and drains  - Monitor endotracheal if appropriate and nasal secretions for changes in amount and color  - [place] appropriate cooling/warming therapies per order  - Administer medications as ordered  - Instruct and encourage patient and family to use good hand hygiene technique  - Identify and instruct in appropriate isolation precautions for identified infection/condition  Outcome: Progressing     Problem: SAFETY ADULT  Goal: Patient will remain free of falls  Description: INTERVENTIONS:  - Educate patient/family on patient safety including physical limitations  - Instruct patient to call for assistance with activity   - Consult OT/PT to assist with strengthening/mobility   - Keep Call bell within reach  - Keep bed low and locked with side rails adjusted as appropriate  - Keep care items and personal belongings within reach  - Initiate and maintain comfort rounds  - Make Fall Risk Sign visible to staff  - Offer Toileting every 2 Hours, in advance of need if unable to reposition self  - Initiate/Maintain bed/chair alarm  - Apply yellow socks and bracelet for high fall risk patients  - Consider moving patient to room near nurses station  Outcome: Progressing  Goal: Maintain or return to baseline ADL function  Description: INTERVENTIONS:  -  Assess patient's ability to carry out ADLs; assess patient's baseline for ADL function and identify physical deficits which impact ability to perform ADLs (bathing, care of mouth/teeth, toileting, grooming, dressing, etc.)  - Assess/evaluate cause of self-care deficits   - Assess range of motion  - Assess patient's mobility; develop plan if impaired  - Assess patient's need for assistive devices and provide as appropriate  - Encourage maximum independence but intervene and supervise when necessary  - Involve family in performance of ADLs  - Assess for home care needs following discharge   - Consider OT consult to assist with ADL evaluation and planning for discharge  - Provide patient education as appropriate  Outcome: Progressing  Goal: Maintains/Returns to pre admission functional level  Description: INTERVENTIONS:  - Perform AM-PAC 6 Click Basic Mobility/ Daily Activity assessment daily.  - Set and communicate daily mobility goal to care team and patient/family/caregiver. - Collaborate with rehabilitation services on mobility goals if consulted  - Perform Range of Motion 3 times a day. - Reposition patient every 2 hours if pt unable to reposition self  - Record patient progress and toleration of activity level   Outcome: Progressing     Problem: DISCHARGE PLANNING  Goal: Discharge to home or other facility with appropriate resources  Description: INTERVENTIONS:  - Identify barriers to discharge w/patient and caregiver  - Arrange for needed discharge resources and transportation as appropriate  - Identify discharge learning needs (meds, wound care, etc.)  - Arrange for interpretive services to assist at discharge as needed  - Refer to Case Management Department for coordinating discharge planning if the patient needs post-hospital services based on physician/advanced practitioner order or complex needs related to functional status, cognitive ability, or social support system  Outcome: Progressing     Problem: Knowledge Deficit  Goal: Patient/family/caregiver demonstrates understanding of disease process, treatment plan, medications, and discharge instructions  Description: Complete learning assessment and assess knowledge base.   Interventions:  - Provide teaching at level of understanding  - Provide teaching via preferred learning methods  Outcome: Progressing     Problem: NEUROSENSORY - ADULT  Goal: Achieves stable or improved neurological status  Description: INTERVENTIONS  - Monitor and report changes in neurological status  - Monitor vital signs such as temperature, blood pressure, glucose, and any other labs ordered   - Initiate measures to prevent increased intracranial pressure  - Monitor for seizure activity and implement precautions if appropriate      Outcome: Progressing  Goal: Remains free of injury related to seizures activity  Description: INTERVENTIONS  - Maintain airway, patient safety  and administer oxygen as ordered  - Monitor patient for seizure activity, document and report duration and description of seizure to physician/advanced practitioner  - If seizure occurs,  ensure patient safety during seizure  - Reorient patient post seizure  - Seizure pads on all 4 side rails  - Instruct patient/family to notify RN of any seizure activity including if an aura is experienced  - Instruct patient/family to call for assistance with activity based on nursing assessment  - Administer anti-seizure medications if ordered    Outcome: Progressing  Goal: Achieves maximal functionality and self care  Description: INTERVENTIONS  - Monitor swallowing and airway patency with patient fatigue and changes in neurological status  - Encourage and assist patient to increase activity and self care. - Encourage visually impaired, hearing impaired and aphasic patients to use assistive/communication devices  Outcome: Progressing     Problem: Prexisting or High Potential for Compromised Skin Integrity  Goal: Skin integrity is maintained or improved  Description: INTERVENTIONS:  - Identify patients at risk for skin breakdown  - Assess and monitor skin integrity  - Assess and monitor nutrition and hydration status  - Monitor labs   - Assess for incontinence   - Turn and reposition patient  - Assist with mobility/ambulation  - Relieve pressure over bony prominences  - Avoid friction and shearing  - Provide appropriate hygiene as needed including keeping skin clean and dry  - Evaluate need for skin moisturizer/barrier cream  - Collaborate with interdisciplinary team   - Patient/family teaching  - Consider wound care consult   Outcome: Progressing     Problem: Nutrition/Hydration-ADULT  Goal: Nutrient/Hydration intake appropriate for improving, restoring or maintaining nutritional needs  Description: Monitor and assess patient's nutrition/hydration status for malnutrition. Collaborate with interdisciplinary team and initiate plan and interventions as ordered.   Monitor patient's weight and dietary intake as ordered or per policy. Utilize nutrition screening tool and intervene as necessary. Determine patient's food preferences and provide high-protein, high-caloric foods as appropriate.      INTERVENTIONS:  - Monitor oral intake, urinary output, labs, and treatment plans  - Assess nutrition and hydration status and recommend course of action  - Evaluate amount of meals eaten  - Assist patient with eating if necessary   - Allow adequate time for meals  - Recommend/ encourage appropriate diets, oral nutritional supplements, and vitamin/mineral supplements  - Order, calculate, and assess calorie counts as needed  - Recommend, monitor, and adjust tube feedings and TPN/PPN based on assessed needs  - Assess need for intravenous fluids  - Provide specific nutrition/hydration education as appropriate  - Include patient/family/caregiver in decisions related to nutrition  Outcome: Progressing

## 2023-12-10 NOTE — RESPIRATORY THERAPY NOTE
RT Protocol Note  Shania Garcia 78 y.o. 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[address])  Active Problems:    Diabetes mellitus ([address])    Hypertension    Class 3 severe obesity with body mass index (BMI) of 40.0 to 44.9 in adult [facility])    Benign prostatic hyperplasia without urinary obstruction    Stage 3a chronic kidney disease (HCC)    Tachypnea    Dysphagia      Home Pulmonary Medications:      Home Devices/Therapy: BiPAP/CPAP    Past Medical History:   Diagnosis Date    Diabetes mellitus ([address])     GERD (gastroesophageal reflux disease)     Hypertension      Social History     Socioeconomic History    Marital status: /Civil Union     Spouse name: None    Number of children: None    Years of education: None    Highest education level: None   Occupational History    None   Tobacco Use    Smoking status: Former     Packs/day: 5.00     Years: 15.00     Total pack years: 75.00     Types: Cigarettes    Smokeless tobacco: Never   Substance and Sexual Activity    Alcohol use: Not Currently    Drug use: Never    Sexual activity: None   Other Topics Concern    None   Social History Narrative    None     Social Determinants of Health     Financial Resource Strain: Not on file   Food Insecurity: No Food Insecurity (12/7/2023)    Hunger Vital Sign     Worried About Running Out of Food in the Last Year: Never true     Ran Out of Food in the Last Year: Never true   Transportation Needs: No Transportation Needs (12/7/2023)    PRAPARE - Transportation     Lack of Transportation (Medical): No     Lack of Transportation (Non-Medical):  No   Physical Activity: Not on file   Stress: Not on file   Social Connections: Not on file   Intimate Partner Violence: Not on file   Housing Stability: Low Risk  (12/7/2023)    Housing Stability Vital Sign     Unable to Pay for Housing in the Last Year: No     Number of Places Lived in the Last Year: 1     Unstable Housing in the Last Year: No       Subjective         Objective    Physical Exam:   Cough: Productive, Congested    Vitals:  Blood pressure 137/62, pulse 69, temperature 98.1 °F (36.7 °C), temperature source Temporal, resp. rate (!) 24, height 5' 10" (1.778 m), weight 128 kg (282 lb 10.1 oz), SpO2 96 %. Imaging and other studies:     O2 Device: 3LPM NC     Plan    Respiratory Plan: No distress/Pulmonary history        Resp Comments: Pt stable on 3lpm NC. Transported to med surg 317 along with bipap for hs.

## 2023-12-11 ENCOUNTER — APPOINTMENT (INPATIENT)
Dept: NON INVASIVE DIAGNOSTICS | Facility: HOSPITAL | Age: 83
End: 2023-12-11
Payer: COMMERCIAL

## 2023-12-11 PROBLEM — R06.89 RESPIRATORY INSUFFICIENCY: Status: ACTIVE | Noted: 2023-12-11

## 2023-12-11 LAB
ALL TARGETS: NOT DETECTED
ANION GAP SERPL CALCULATED.3IONS-SCNC: 6 MMOL/L
AORTIC ROOT: 4.5 CM
AORTIC VALVE MEAN VELOCITY: 7.4 M/S
APICAL FOUR CHAMBER EJECTION FRACTION: 78 %
AV AREA BY CONTINUOUS VTI: 4.2 CM2
AV AREA PEAK VELOCITY: 3.4 CM2
AV LVOT MEAN GRADIENT: 1 MMHG
AV LVOT PEAK GRADIENT: 3 MMHG
AV MEAN GRADIENT: 3 MMHG
AV PEAK GRADIENT: 6 MMHG
AV VALVE AREA: 4.22 CM2
AV VELOCITY RATIO: 0.75
BACTERIA BLD CULT: ABNORMAL
BUN SERPL-MCNC: 20 MG/DL (ref 5–25)
CALCIUM SERPL-MCNC: 8.7 MG/DL (ref 8.4–10.2)
CHLORIDE SERPL-SCNC: 108 MMOL/L (ref 96–108)
CO2 SERPL-SCNC: 27 MMOL/L (ref 21–32)
CREAT SERPL-MCNC: 1.14 MG/DL (ref 0.6–1.3)
DOP CALC AO PEAK VEL: 1.23 M/S
DOP CALC AO VTI: 24.65 CM
DOP CALC LVOT AREA: 4.52 CM2
DOP CALC LVOT CARDIAC INDEX: 2.19 L/MIN/M2
DOP CALC LVOT CARDIAC OUTPUT: 5.29 L/MIN
DOP CALC LVOT DIAMETER: 2.4 CM
DOP CALC LVOT PEAK VEL VTI: 23.02 CM
DOP CALC LVOT PEAK VEL: 0.92 M/S
DOP CALC LVOT STROKE INDEX: 42.6 ML/M2
DOP CALC LVOT STROKE VOLUME: 104.09
E WAVE DECELERATION TIME: 291 MS
E/A RATIO: 0.98
ERYTHROCYTE [DISTWIDTH] IN BLOOD BY AUTOMATED COUNT: 15.3 % (ref 11.6–15.1)
FRACTIONAL SHORTENING: 50 (ref 28–44)
GFR SERPL CREATININE-BSD FRML MDRD: 58 ML/MIN/1.73SQ M
GLUCOSE SERPL-MCNC: 102 MG/DL (ref 65–140)
GLUCOSE SERPL-MCNC: 115 MG/DL (ref 65–140)
GLUCOSE SERPL-MCNC: 120 MG/DL (ref 65–140)
GLUCOSE SERPL-MCNC: 125 MG/DL (ref 65–140)
GLUCOSE SERPL-MCNC: 97 MG/DL (ref 65–140)
GRAM STN SPEC: ABNORMAL
HCT VFR BLD AUTO: 46.1 % (ref 36.5–49.3)
HGB BLD-MCNC: 14 G/DL (ref 12–17)
INTERVENTRICULAR SEPTUM IN DIASTOLE (PARASTERNAL SHORT AXIS VIEW): 1.5 CM
INTERVENTRICULAR SEPTUM: 1.5 CM (ref 0.6–1.1)
LAAS-AP2: 16.7 CM2
LAAS-AP4: 16.9 CM2
LEFT ATRIUM SIZE: 3.1 CM
LEFT ATRIUM VOLUME (MOD BIPLANE): 41 ML
LEFT ATRIUM VOLUME INDEX (MOD BIPLANE): 16.9 ML/M2
LEFT INTERNAL DIMENSION IN SYSTOLE: 2.4 CM (ref 2.1–4)
LEFT VENTRICLE DIASTOLIC VOLUME (MOD BIPLANE): 107 ML
LEFT VENTRICLE SYSTOLIC VOLUME (MOD BIPLANE): 28 ML
LEFT VENTRICULAR INTERNAL DIMENSION IN DIASTOLE: 4.8 CM (ref 3.5–6)
LEFT VENTRICULAR POSTERIOR WALL IN END DIASTOLE: 1.5 CM
LEFT VENTRICULAR STROKE VOLUME: 86 ML
LV EF: 74 %
LVSV (TEICH): 86 ML
MAGNESIUM SERPL-MCNC: 2.1 MG/DL (ref 1.9–2.7)
MCH RBC QN AUTO: 26.4 PG (ref 26.8–34.3)
MCHC RBC AUTO-ENTMCNC: 30.4 G/DL (ref 31.4–37.4)
MCV RBC AUTO: 87 FL (ref 82–98)
MV E'TISSUE VEL-LAT: 7 CM/S
MV E'TISSUE VEL-SEP: 5 CM/S
MV PEAK A VEL: 0.85 M/S
MV PEAK E VEL: 83 CM/S
MV STENOSIS PRESSURE HALF TIME: 84 MS
MV VALVE AREA P 1/2 METHOD: 2.62
PLATELET # BLD AUTO: 219 THOUSANDS/UL (ref 149–390)
PMV BLD AUTO: 9.6 FL (ref 8.9–12.7)
POTASSIUM SERPL-SCNC: 4 MMOL/L (ref 3.5–5.3)
PROCALCITONIN SERPL-MCNC: 0.06 NG/ML
RBC # BLD AUTO: 5.31 MILLION/UL (ref 3.88–5.62)
RIGHT ATRIUM AREA SYSTOLE A4C: 20.1 CM2
RIGHT VENTRICLE ID DIMENSION: 3.6 CM
SINOTUBULAR JUNCTION: 2.8 CM
SL CV LEFT ATRIUM LENGTH A2C: 5.7 CM
SL CV LV EF: 65
SL CV PED ECHO LEFT VENTRICLE DIASTOLIC VOLUME (MOD BIPLANE) 2D: 107 ML
SL CV PED ECHO LEFT VENTRICLE SYSTOLIC VOLUME (MOD BIPLANE) 2D: 21 ML
SL CV SINUS OF VALSALVA 2D: 4.2 CM
SODIUM SERPL-SCNC: 141 MMOL/L (ref 135–147)
STJ: 2.8 CM
TRICUSPID ANNULAR PLANE SYSTOLIC EXCURSION: 2.3 CM
WBC # BLD AUTO: 9.89 THOUSAND/UL (ref 4.31–10.16)

## 2023-12-11 PROCEDURE — 97110 THERAPEUTIC EXERCISES: CPT

## 2023-12-11 PROCEDURE — 92526 ORAL FUNCTION THERAPY: CPT

## 2023-12-11 PROCEDURE — 83735 ASSAY OF MAGNESIUM: CPT

## 2023-12-11 PROCEDURE — 97535 SELF CARE MNGMENT TRAINING: CPT

## 2023-12-11 PROCEDURE — 94760 N-INVAS EAR/PLS OXIMETRY 1: CPT

## 2023-12-11 PROCEDURE — C9113 INJ PANTOPRAZOLE SODIUM, VIA: HCPCS | Performed by: PHYSICIAN ASSISTANT

## 2023-12-11 PROCEDURE — 92507 TX SP LANG VOICE COMM INDIV: CPT

## 2023-12-11 PROCEDURE — 84145 PROCALCITONIN (PCT): CPT

## 2023-12-11 PROCEDURE — 80048 BASIC METABOLIC PNL TOTAL CA: CPT

## 2023-12-11 PROCEDURE — 82948 REAGENT STRIP/BLOOD GLUCOSE: CPT

## 2023-12-11 PROCEDURE — 93306 TTE W/DOPPLER COMPLETE: CPT

## 2023-12-11 PROCEDURE — 94664 DEMO&/EVAL PT USE INHALER: CPT

## 2023-12-11 PROCEDURE — 99232 SBSQ HOSP IP/OBS MODERATE 35: CPT

## 2023-12-11 PROCEDURE — 85027 COMPLETE CBC AUTOMATED: CPT

## 2023-12-11 PROCEDURE — 94668 MNPJ CHEST WALL SBSQ: CPT

## 2023-12-11 PROCEDURE — 97530 THERAPEUTIC ACTIVITIES: CPT

## 2023-12-11 RX ORDER — LOSARTAN POTASSIUM 25 MG/1
25 TABLET ORAL ONCE
Status: COMPLETED | OUTPATIENT
Start: 2023-12-11 | End: 2023-12-11

## 2023-12-11 RX ORDER — LOSARTAN POTASSIUM 50 MG/1
50 TABLET ORAL DAILY
Status: DISCONTINUED | OUTPATIENT
Start: 2023-12-12 | End: 2023-12-12

## 2023-12-11 RX ADMIN — LOSARTAN POTASSIUM 25 MG: 25 TABLET, FILM COATED ORAL at 07:44

## 2023-12-11 RX ADMIN — HEPARIN SODIUM 5000 UNITS: 5000 INJECTION INTRAVENOUS; SUBCUTANEOUS at 07:42

## 2023-12-11 RX ADMIN — HEPARIN SODIUM 5000 UNITS: 5000 INJECTION INTRAVENOUS; SUBCUTANEOUS at 20:24

## 2023-12-11 RX ADMIN — METOPROLOL TARTRATE 50 MG: 50 TABLET, FILM COATED ORAL at 20:24

## 2023-12-11 RX ADMIN — PANTOPRAZOLE SODIUM 40 MG: 40 INJECTION, POWDER, FOR SOLUTION INTRAVENOUS at 05:04

## 2023-12-11 RX ADMIN — METOPROLOL TARTRATE 50 MG: 50 TABLET, FILM COATED ORAL at 07:43

## 2023-12-11 RX ADMIN — AMLODIPINE BESYLATE 10 MG: 10 TABLET ORAL at 07:42

## 2023-12-11 RX ADMIN — LEVOTHYROXINE SODIUM 200 MCG: 100 TABLET ORAL at 05:04

## 2023-12-11 RX ADMIN — LIDOCAINE 5% 2 PATCH: 700 PATCH TOPICAL at 20:24

## 2023-12-11 RX ADMIN — LOSARTAN POTASSIUM 25 MG: 25 TABLET, FILM COATED ORAL at 09:38

## 2023-12-11 NOTE — PLAN OF CARE
Problem: Potential for Falls  Goal: Patient will remain free of falls  Description: INTERVENTIONS:  - Educate patient/family on patient safety including physical limitations  - Instruct patient to call for assistance with activity   - Consult OT/PT to assist with strengthening/mobility   - Keep Call bell within reach  - Keep bed low and locked with side rails adjusted as appropriate  - Keep care items and personal belongings within reach  - Initiate and maintain comfort rounds  - Make Fall Risk Sign visible to staff  - Offer Toileting every 2 Hours, in advance of need if unable to reposition self  - Initiate/Maintain bed/chair alarm  - Apply yellow socks and bracelet for high fall risk patients  - Consider moving patient to room near nurses station  Outcome: Progressing     Problem: PAIN - ADULT  Goal: Verbalizes/displays adequate comfort level or baseline comfort level  Description: Interventions:  - Encourage patient to monitor pain and request assistance  - Assess pain using appropriate pain scale  - Administer analgesics based on type and severity of pain and evaluate response  - Implement non-pharmacological measures as appropriate and evaluate response  - Consider cultural and social influences on pain and pain management  - Notify physician/advanced practitioner if interventions unsuccessful or patient reports new pain  Outcome: Progressing     Problem: INFECTION - ADULT  Goal: Absence or prevention of progression during hospitalization  Description: INTERVENTIONS:  - Assess and monitor for signs and symptoms of infection  - Monitor lab/diagnostic results  - Monitor all insertion sites, i.e. indwelling lines, tubes, and drains  - Monitor endotracheal if appropriate and nasal secretions for changes in amount and color  - [place] appropriate cooling/warming therapies per order  - Administer medications as ordered  - Instruct and encourage patient and family to use good hand hygiene technique  - Identify and instruct in appropriate isolation precautions for identified infection/condition  Outcome: Progressing     Problem: SAFETY ADULT  Goal: Patient will remain free of falls  Description: INTERVENTIONS:  - Educate patient/family on patient safety including physical limitations  - Instruct patient to call for assistance with activity   - Consult OT/PT to assist with strengthening/mobility   - Keep Call bell within reach  - Keep bed low and locked with side rails adjusted as appropriate  - Keep care items and personal belongings within reach  - Initiate and maintain comfort rounds  - Make Fall Risk Sign visible to staff  - Offer Toileting every 2 Hours, in advance of need if unable to reposition self  - Initiate/Maintain bed/chair alarm  - Apply yellow socks and bracelet for high fall risk patients  - Consider moving patient to room near nurses station  Outcome: Progressing  Goal: Maintain or return to baseline ADL function  Description: INTERVENTIONS:  -  Assess patient's ability to carry out ADLs; assess patient's baseline for ADL function and identify physical deficits which impact ability to perform ADLs (bathing, care of mouth/teeth, toileting, grooming, dressing, etc.)  - Assess/evaluate cause of self-care deficits   - Assess range of motion  - Assess patient's mobility; develop plan if impaired  - Assess patient's need for assistive devices and provide as appropriate  - Encourage maximum independence but intervene and supervise when necessary  - Involve family in performance of ADLs  - Assess for home care needs following discharge   - Consider OT consult to assist with ADL evaluation and planning for discharge  - Provide patient education as appropriate  Outcome: Progressing  Goal: Maintains/Returns to pre admission functional level  Description: INTERVENTIONS:  - Perform AM-PAC 6 Click Basic Mobility/ Daily Activity assessment daily.  - Set and communicate daily mobility goal to care team and patient/family/caregiver. - Collaborate with rehabilitation services on mobility goals if consulted  - Perform Range of Motion 3 times a day. - Reposition patient every 2 hours if pt unable to reposition self  - Record patient progress and toleration of activity level   Outcome: Progressing     Problem: DISCHARGE PLANNING  Goal: Discharge to home or other facility with appropriate resources  Description: INTERVENTIONS:  - Identify barriers to discharge w/patient and caregiver  - Arrange for needed discharge resources and transportation as appropriate  - Identify discharge learning needs (meds, wound care, etc.)  - Arrange for interpretive services to assist at discharge as needed  - Refer to Case Management Department for coordinating discharge planning if the patient needs post-hospital services based on physician/advanced practitioner order or complex needs related to functional status, cognitive ability, or social support system  Outcome: Progressing     Problem: Knowledge Deficit  Goal: Patient/family/caregiver demonstrates understanding of disease process, treatment plan, medications, and discharge instructions  Description: Complete learning assessment and assess knowledge base.   Interventions:  - Provide teaching at level of understanding  - Provide teaching via preferred learning methods  Outcome: Progressing     Problem: NEUROSENSORY - ADULT  Goal: Achieves stable or improved neurological status  Description: INTERVENTIONS  - Monitor and report changes in neurological status  - Monitor vital signs such as temperature, blood pressure, glucose, and any other labs ordered   - Initiate measures to prevent increased intracranial pressure  - Monitor for seizure activity and implement precautions if appropriate      Outcome: Progressing  Goal: Remains free of injury related to seizures activity  Description: INTERVENTIONS  - Maintain airway, patient safety  and administer oxygen as ordered  - Monitor patient for seizure activity, document and report duration and description of seizure to physician/advanced practitioner  - If seizure occurs,  ensure patient safety during seizure  - Reorient patient post seizure  - Seizure pads on all 4 side rails  - Instruct patient/family to notify RN of any seizure activity including if an aura is experienced  - Instruct patient/family to call for assistance with activity based on nursing assessment  - Administer anti-seizure medications if ordered    Outcome: Progressing  Goal: Achieves maximal functionality and self care  Description: INTERVENTIONS  - Monitor swallowing and airway patency with patient fatigue and changes in neurological status  - Encourage and assist patient to increase activity and self care. - Encourage visually impaired, hearing impaired and aphasic patients to use assistive/communication devices  Outcome: Progressing     Problem: Prexisting or High Potential for Compromised Skin Integrity  Goal: Skin integrity is maintained or improved  Description: INTERVENTIONS:  - Identify patients at risk for skin breakdown  - Assess and monitor skin integrity  - Assess and monitor nutrition and hydration status  - Monitor labs   - Assess for incontinence   - Turn and reposition patient  - Assist with mobility/ambulation  - Relieve pressure over bony prominences  - Avoid friction and shearing  - Provide appropriate hygiene as needed including keeping skin clean and dry  - Evaluate need for skin moisturizer/barrier cream  - Collaborate with interdisciplinary team   - Patient/family teaching  - Consider wound care consult   Outcome: Progressing     Problem: Nutrition/Hydration-ADULT  Goal: Nutrient/Hydration intake appropriate for improving, restoring or maintaining nutritional needs  Description: Monitor and assess patient's nutrition/hydration status for malnutrition. Collaborate with interdisciplinary team and initiate plan and interventions as ordered.   Monitor patient's weight and dietary intake as ordered or per policy. Utilize nutrition screening tool and intervene as necessary. Determine patient's food preferences and provide high-protein, high-caloric foods as appropriate.      INTERVENTIONS:  - Monitor oral intake, urinary output, labs, and treatment plans  - Assess nutrition and hydration status and recommend course of action  - Evaluate amount of meals eaten  - Assist patient with eating if necessary   - Allow adequate time for meals  - Recommend/ encourage appropriate diets, oral nutritional supplements, and vitamin/mineral supplements  - Order, calculate, and assess calorie counts as needed  - Recommend, monitor, and adjust tube feedings and TPN/PPN based on assessed needs  - Assess need for intravenous fluids  - Provide specific nutrition/hydration education as appropriate  - Include patient/family/caregiver in decisions related to nutrition  Outcome: Progressing

## 2023-12-11 NOTE — PLAN OF CARE
Problem: Potential for Falls  Goal: Patient will remain free of falls  Description: INTERVENTIONS:  - Educate patient/family on patient safety including physical limitations  - Instruct patient to call for assistance with activity   - Consult OT/PT to assist with strengthening/mobility   - Keep Call bell within reach  - Keep bed low and locked with side rails adjusted as appropriate  - Keep care items and personal belongings within reach  - Initiate and maintain comfort rounds  - Make Fall Risk Sign visible to staff  - Offer Toileting every 2 Hours, in advance of need if unable to reposition self  - Initiate/Maintain bed/chair alarm  - Apply yellow socks and bracelet for high fall risk patients  - Consider moving patient to room near nurses station  Outcome: Progressing     Problem: PAIN - ADULT  Goal: Verbalizes/displays adequate comfort level or baseline comfort level  Description: Interventions:  - Encourage patient to monitor pain and request assistance  - Assess pain using appropriate pain scale  - Administer analgesics based on type and severity of pain and evaluate response  - Implement non-pharmacological measures as appropriate and evaluate response  - Consider cultural and social influences on pain and pain management  - Notify physician/advanced practitioner if interventions unsuccessful or patient reports new pain  Outcome: Progressing     Problem: INFECTION - ADULT  Goal: Absence or prevention of progression during hospitalization  Description: INTERVENTIONS:  - Assess and monitor for signs and symptoms of infection  - Monitor lab/diagnostic results  - Monitor all insertion sites, i.e. indwelling lines, tubes, and drains  - Monitor endotracheal if appropriate and nasal secretions for changes in amount and color  - [place] appropriate cooling/warming therapies per order  - Administer medications as ordered  - Instruct and encourage patient and family to use good hand hygiene technique  - Identify and instruct in appropriate isolation precautions for identified infection/condition  Outcome: Progressing     Problem: SAFETY ADULT  Goal: Patient will remain free of falls  Description: INTERVENTIONS:  - Educate patient/family on patient safety including physical limitations  - Instruct patient to call for assistance with activity   - Consult OT/PT to assist with strengthening/mobility   - Keep Call bell within reach  - Keep bed low and locked with side rails adjusted as appropriate  - Keep care items and personal belongings within reach  - Initiate and maintain comfort rounds  - Make Fall Risk Sign visible to staff  - Offer Toileting every 2 Hours, in advance of need if unable to reposition self  - Initiate/Maintain bed/chair alarm  - Apply yellow socks and bracelet for high fall risk patients  - Consider moving patient to room near nurses station  Outcome: Progressing  Goal: Maintain or return to baseline ADL function  Description: INTERVENTIONS:  -  Assess patient's ability to carry out ADLs; assess patient's baseline for ADL function and identify physical deficits which impact ability to perform ADLs (bathing, care of mouth/teeth, toileting, grooming, dressing, etc.)  - Assess/evaluate cause of self-care deficits   - Assess range of motion  - Assess patient's mobility; develop plan if impaired  - Assess patient's need for assistive devices and provide as appropriate  - Encourage maximum independence but intervene and supervise when necessary  - Involve family in performance of ADLs  - Assess for home care needs following discharge   - Consider OT consult to assist with ADL evaluation and planning for discharge  - Provide patient education as appropriate  Outcome: Progressing  Goal: Maintains/Returns to pre admission functional level  Description: INTERVENTIONS:  - Perform AM-PAC 6 Click Basic Mobility/ Daily Activity assessment daily.  - Set and communicate daily mobility goal to care team and patient/family/caregiver. - Collaborate with rehabilitation services on mobility goals if consulted  - Perform Range of Motion 3 times a day. - Reposition patient every 2 hours if pt unable to reposition self  - Record patient progress and toleration of activity level   Outcome: Progressing     Problem: DISCHARGE PLANNING  Goal: Discharge to home or other facility with appropriate resources  Description: INTERVENTIONS:  - Identify barriers to discharge w/patient and caregiver  - Arrange for needed discharge resources and transportation as appropriate  - Identify discharge learning needs (meds, wound care, etc.)  - Arrange for interpretive services to assist at discharge as needed  - Refer to Case Management Department for coordinating discharge planning if the patient needs post-hospital services based on physician/advanced practitioner order or complex needs related to functional status, cognitive ability, or social support system  Outcome: Progressing     Problem: Knowledge Deficit  Goal: Patient/family/caregiver demonstrates understanding of disease process, treatment plan, medications, and discharge instructions  Description: Complete learning assessment and assess knowledge base.   Interventions:  - Provide teaching at level of understanding  - Provide teaching via preferred learning methods  Outcome: Progressing     Problem: NEUROSENSORY - ADULT  Goal: Achieves stable or improved neurological status  Description: INTERVENTIONS  - Monitor and report changes in neurological status  - Monitor vital signs such as temperature, blood pressure, glucose, and any other labs ordered   - Initiate measures to prevent increased intracranial pressure  - Monitor for seizure activity and implement precautions if appropriate      Outcome: Progressing  Goal: Remains free of injury related to seizures activity  Description: INTERVENTIONS  - Maintain airway, patient safety  and administer oxygen as ordered  - Monitor patient for seizure activity, document and report duration and description of seizure to physician/advanced practitioner  - If seizure occurs,  ensure patient safety during seizure  - Reorient patient post seizure  - Seizure pads on all 4 side rails  - Instruct patient/family to notify RN of any seizure activity including if an aura is experienced  - Instruct patient/family to call for assistance with activity based on nursing assessment  - Administer anti-seizure medications if ordered    Outcome: Progressing  Goal: Achieves maximal functionality and self care  Description: INTERVENTIONS  - Monitor swallowing and airway patency with patient fatigue and changes in neurological status  - Encourage and assist patient to increase activity and self care. - Encourage visually impaired, hearing impaired and aphasic patients to use assistive/communication devices  Outcome: Progressing     Problem: Prexisting or High Potential for Compromised Skin Integrity  Goal: Skin integrity is maintained or improved  Description: INTERVENTIONS:  - Identify patients at risk for skin breakdown  - Assess and monitor skin integrity  - Assess and monitor nutrition and hydration status  - Monitor labs   - Assess for incontinence   - Turn and reposition patient  - Assist with mobility/ambulation  - Relieve pressure over bony prominences  - Avoid friction and shearing  - Provide appropriate hygiene as needed including keeping skin clean and dry  - Evaluate need for skin moisturizer/barrier cream  - Collaborate with interdisciplinary team   - Patient/family teaching  - Consider wound care consult   Outcome: Progressing     Problem: Nutrition/Hydration-ADULT  Goal: Nutrient/Hydration intake appropriate for improving, restoring or maintaining nutritional needs  Description: Monitor and assess patient's nutrition/hydration status for malnutrition. Collaborate with interdisciplinary team and initiate plan and interventions as ordered.   Monitor patient's weight and dietary intake as ordered or per policy. Utilize nutrition screening tool and intervene as necessary. Determine patient's food preferences and provide high-protein, high-caloric foods as appropriate.      INTERVENTIONS:  - Monitor oral intake, urinary output, labs, and treatment plans  - Assess nutrition and hydration status and recommend course of action  - Evaluate amount of meals eaten  - Assist patient with eating if necessary   - Allow adequate time for meals  - Recommend/ encourage appropriate diets, oral nutritional supplements, and vitamin/mineral supplements  - Order, calculate, and assess calorie counts as needed  - Recommend, monitor, and adjust tube feedings and TPN/PPN based on assessed needs  - Assess need for intravenous fluids  - Provide specific nutrition/hydration education as appropriate  - Include patient/family/caregiver in decisions related to nutrition  Outcome: Progressing

## 2023-12-11 NOTE — SPEECH THERAPY NOTE
Speech Language/Pathology    Speech/Language Pathology Progress Note    Patient Name: Jacklyn Hatch  OAQJW'M Date: 12/11/2023      Subjective:  "I'm doing ok"    Objective:  Dysphagia and speech/cog tx    Assessment:  For dysphagia tx, pt seen during lunch meal w/ puree and honey thick liquids via cup. Pt now self feeding at adequate rate. Prolonged oral manipulation and impaired bolus formation w/ min oral residual. Pharyngeal swallow appears delayed but present, encouraged secondary swallow to clear pharyngeal stasis based off of VBS on Friday. Wet throat clear x1 w/ HTL via cup. Reviewed w/ RN recommendation for meds crushed in puree, updated signs as well. For speech/lang/cog tx:  -DDK completion- improvement noted w/ mid and back phonemes but still somewhat distorted. Back worse than mid. Connected speech significantly improved however still mildly dysarthric. Nasal emissions noted. -Pt was oriented to month, OLIV[place], year, and situation but not to place or date. Plan/Recommendations:  Continue puree and honey thick liquids. Meds crushed in puree.   Recommend repeat VBS prior to advancement of diet  Recommend continued speech therapy services at next level of care    Marj Eldridge, [id number] [facility]  12/11/2023

## 2023-12-11 NOTE — PLAN OF CARE
Problem: OCCUPATIONAL THERAPY ADULT  Goal: Performs self-care activities at highest level of function for planned discharge setting. See evaluation for individualized goals. Description: Treatment Interventions: ADL retraining, Functional transfer training, Endurance training, Patient/family training, Equipment evaluation/education, Neuromuscular reeducation, Fine motor coordination activities, Compensatory technique education, Continued evaluation, Energy conservation, Activityengagement          See flowsheet documentation for full assessment, interventions and recommendations. Outcome: Progressing  Note: Limitation: Decreased ADL status, Decreased Safe judgement during ADL, Decreased endurance, Decreased fine motor control, Decreased self-care trans, Decreased high-level ADLs     Assessment: Pt seen for OT treatment session focusing on ADLs, bed mobility, transfers, endurance training, continued evaluation, energy conservation. Pt greeted in supine in bed at start of session. Pt alert and cooperative throughout session. Pt with fair + sitting balance and poor + standing balance. Pt tolerated treatment well. Pt completed ADLs at EOB, grooming, UB bathing, LB bathing, UB dressing, LB dressing, toileting, transfers, bed mobility during treatment today. Pt requires supervision for grooming/toileting, Tj for UB bathing, and UB dressing, modA for LB dressing and LB bathing, Tj for bed mobility, and supervision for transfers. Pt reports no pain and no dizziness. Pt's vitals include: /74 and Back to bed after toileting 154/69. Pt ended session seated supine in bed . Call bell and phone within reach. All needs met and pt reports no further questions at this time. Continue to recommend maximum resource intensity when medically cleared. OT will continue to follow pt on caseload.      Rehab Resource Intensity Level, OT: I (Maximum Resource Intensity)

## 2023-12-11 NOTE — OCCUPATIONAL THERAPY NOTE
Occupational Therapy Progress Note     Patient Name: Eduin Yoon  WRHQV'G Date: 12/11/2023  Problem List  Principal Problem:    Intraparenchymal hemorrhage of brain [facility])  Active Problems:    Diabetes mellitus ([address])    Hypertension    Class 3 severe obesity with body mass index (BMI) of 40.0 to 44.9 in adult [facility])    Benign prostatic hyperplasia without urinary obstruction    Stage 3a chronic kidney disease (HCC)    Tachypnea    Dysphagia    Positive blood culture     12/11/23 1256   OT Last Visit   OT Visit Date 12/11/23   Note Type   Note Type Treatment   Pain Assessment   Pain Assessment Tool 0-10   Pain Score No Pain   Restrictions/Precautions   Weight Bearing Precautions Per Order No   Other Precautions Chair Alarm; Bed Alarm; Fall Risk   ADL   Where Assessed Edge of bed   Grooming Assistance 5  Supervision/Setup   Grooming Deficit Setup;Steadying;Verbal cueing;Wash/dry hands; Wash/dry face   UB Bathing Assistance 4  Minimal Assistance   UB Bathing Deficit Setup;Steadying;Verbal cueing; Increased time to complete; Chest;Left arm;Right arm; Abdomen   LB Bathing Assistance 3  Moderate Assistance   LB Bathing Deficit Setup;Steadying;Verbal cueing;Supervision/safety; Increased time to complete;Perineal area; Buttocks;Right upper leg;Left upper leg   UB Dressing Assistance 4  Minimal Assistance   UB Dressing Deficit Setup;Steadying;Supervision/safety; Thread LUE; Thread RUE;Pull around back   LB Dressing Assistance 2  Maximal Assistance   LB Dressing Deficit Setup;Steadying;Supervision/safety; Increased time to complete; Don/doff R sock; Don/doff L sock   Toileting Assistance  5  Supervision/Setup   Toileting Deficit Verbal cueing;Supervison/safety; Increased time to complete;Grab bar use;Perineal hygiene  (use of RW for support)   Bed Mobility   Supine to Sit 4  Minimal assistance   Additional items Assist x 1; Increased time required;Verbal cues;LE management   Sit to Supine 4  Minimal assistance   Additional items Assist x 1;Bedrails; Increased time required;Verbal cues;LE management   Additional Comments Pt recieved in supine in bed with HOB elevated. Pt needed increased verbal cues for hand placement   Transfers   Sit to Stand 5  Supervision   Additional items Bedrails; Increased time required;Verbal cues  (RW for support)   Stand to Sit 5  Supervision   Additional items Bedrails; Increased time required;Verbal cues   Toilet transfer 5  Supervision   Additional items Verbal cues;Standard toilet; Increased time required  (grab bars and RW)   Additional Comments   (use of RW for support. Pt sit to stand with supervision and RW. Verbal cues for safe transfer techniques and hand placement)   Functional Mobility   Functional Mobility 4  Minimal assistance   Additional Comments Short room distance from bed to toilet   Additional items Rolling walker   Toilet Transfers   Toilet Transfer From Bed   Toilet Transfer Type To   Toilet Transfer to Standard toilet   Toilet Transfer Technique Ambulating   Toilet Transfers Supervision   Toilet Transfers Comments use of wc back from toilet to bed   Cognition   Overall Cognitive Status [facility]   Arousal/Participation Alert; Cooperative   Attention Within functional limits   Orientation Level Oriented to person;Oriented to place;Oriented to time;Disoriented to situation   Memory Unable to assess   Following Commands Follows one step commands with increased time or repetition   Comments Pt with slurred speech, difficult to understand at times. Activity Tolerance   Activity Tolerance Patient tolerated treatment well   Medical Staff Made Aware PT Nurse María aware of tx session   Assessment   Assessment Pt seen for OT treatment session focusing on ADLs, bed mobility, transfers, endurance training, continued evaluation, energy conservation. Pt greeted in supine in bed at start of session. Pt alert and cooperative throughout session. Pt with fair + sitting balance and poor + standing balance.  Pt tolerated treatment well. Pt completed ADLs at EOB, grooming, UB bathing, LB bathing, UB dressing, LB dressing, toileting, transfers, bed mobility during treatment today. Pt requires supervision for grooming/toileting, Tj for UB bathing, and UB dressing, modA for LB dressing and LB bathing, Tj for bed mobility, and supervision for transfers. Pt reports no pain and no dizziness. Pt's vitals include: /74 and Back to bed after toileting 154/69. Pt ended session seated supine in bed . Call bell and phone within reach. All needs met and pt reports no further questions at this time. Continue to recommend maximum resource intensity when medically cleared. OT will continue to follow pt on caseload. Plan   Treatment Interventions ADL retraining;Functional transfer training;UE strengthening/ROM; Endurance training;Neuromuscular reeducation; Compensatory technique education;Continued evaluation; Energy conservation; Activityengagement   Goal Expiration Date 12/21/23   OT Treatment Day 2   OT Frequency 3-5x/wk   Discharge Recommendation   Rehab Resource Intensity Level, OT I (Maximum Resource Intensity)   Additional Comments  The patient's raw score on the AM-PAC Daily Activity Inpatient Short Form is 12. A raw score of less than 19 suggests the patient may benefit from discharge to post-acute rehabilitation services. Please refer to the recommendation of the Occupational Therapist for safe discharge planning.    AM-PAC Daily Activity Inpatient   Lower Body Dressing 2   Bathing 2   Toileting 2   Upper Body Dressing 2   Grooming 2   Eating 2   Daily Activity Raw Score 12   Daily Activity Standardized Score (Calc for Raw Score >=11) 30.6   AM-PAC Applied Cognition Inpatient   Following a Speech/Presentation 3   Understanding Ordinary Conversation 4   Taking Medications 3   Remembering Where Things Are Placed or Put Away 3   Remembering List of 4-5 Errands 3   Taking Care of Complicated Tasks 3   Applied Cognition Raw Score 19 Applied Cognition Standardized Score 39.77   End of Consult   Education Provided Yes   Patient Position at End of Consult Supine;Bed/Chair alarm activated; All needs within reach   Nurse Communication Nurse aware of consult   Leigh Ann Sanders OT

## 2023-12-11 NOTE — ASSESSMENT & PLAN NOTE
Patient with positive blood culture 1/2 on 12/6 and 1/2 repeated on 12/8. BC from 12/6 grew: Streptococcus mitis oralis susceptible to rocephin  BC from 12/8 currently growing gram positive rods, blood culture identification panel unable to detect organism  Will repeat blood cultures and reach out to ID regarding BC results. Patient currently not on antibiotics, does have mild leukocytosis. Procal normal  Leukocytosis resolved  Discussed with ID: repeat BC and monitor those. Recommending echo at this time. Hold on abx. Echo: : Left ventricular cavity size is normal. Wall thickness is normal. The left ventricular ejection fraction is 65%.  Systolic function is normal. Wall motion is normal. Diastolic function is normal. No stenosis and no vegetations noted

## 2023-12-11 NOTE — PLAN OF CARE
Flowsheet time incorrectly documented: actual tx time 13:30, NOT 1500. Problem: PHYSICAL THERAPY ADULT  Goal: Performs mobility at highest level of function for planned discharge setting. See evaluation for individualized goals. Description: Treatment/Interventions: ADL retraining, Functional transfer training, LE strengthening/ROM, Elevations, Therapeutic exercise, Endurance training, Patient/family training, Gait training, Bed mobility, Equipment eval/education, Compensatory technique education, Spoke to nursing, Spoke to case management, OT          See flowsheet documentation for full assessment, interventions and recommendations. Outcome: Progressing  Note: Prognosis: Good  Problem List: Decreased strength, Decreased endurance, Impaired balance, Decreased coordination, Decreased mobility, Impaired judgement, Decreased safety awareness, Obesity  Assessment: PT treatment session limited 2/2 BP at rest of 153/74 at EOB > Pt ambulated to toilet using RW and returned to bed via staxi with minimal change in BP. Pt demonstrating progress towards functional goals with decreased assistance levels needed for functional mobility, however cont to require cues for gait mechanics and sequencing for safety. Clot with cris colored urine noted in carbajal at end of treatment session > RN notified of BP and urine findings. The patient's AM-PAC Basic Mobility Inpatient Short Form Raw Score is 15. A Raw score of less than or equal to 16 suggests the patient may benefit from discharge to post-acute rehabilitation services. Please also refer to the recommendation of the Physical Therapist for safe discharge planning. Will cont to benefit from skilled IPPT and will continue to follow per PT POC. Cont to recommend level I (max) rehab intensity resources based on pt presentation and functional performance.   Barriers to Discharge: Decreased caregiver support, Inaccessible home environment  Barriers to Discharge Comments: inc assistance required vs. baseline and fall risk  Rehab Resource Intensity Level, PT: I (Maximum Resource Intensity)    See flowsheet documentation for full assessment.

## 2023-12-11 NOTE — PHYSICAL THERAPY NOTE
PHYSICAL THERAPY TREATMENT NOTE  Time incorrectly inputted at 1500 in flowsheet--Time of treatment 13:30    Patient Name: Jacklyn Hatch  WDAZM'S Date: 12/11/2023 12/11/23 1309 12/11/23 1327   Vitals   Pulse 57 (!) 54   Blood Pressure 153/74; pre /69 post EOB   MAP (mmHg) 100 97   Oxygen Therapy   SpO2 95 % 92 %        12/11/23 1500 (13:30)   PT Last Visit   PT Visit Date 12/11/23   Note Type   Note Type Treatment   Pain Assessment   Pain Assessment Tool 0-10   Pain Score No Pain   Restrictions/Precautions   Weight Bearing Precautions Per Order No   Other Precautions Chair Alarm; Bed Alarm; Fall Risk;Aspiration  (monitor vitals, HOB 30*)   General   Additional Pertinent History Per hospitalist note 12/11: BP goal <160. CT head 12/6/23: Acute hemorrhage centered in the left thalamus and cerebral peduncle measuring 1.3 x 1.3 x 1.5 cm. Minimal surrounding vasogenic edema. CT head 12/7/23: Stable left thalamic hematoma. 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 Cognition   Overall Cognitive Status WFL   Arousal/Participation Alert; Cooperative   Attention Within functional limits   Orientation Level Oriented to person;Oriented to place;Oriented to time;Disoriented to situation   Memory Unable to assess   Following Commands Follows one step commands with increased time or repetition   Comments Pt presenting with slurred speech, occasionally difficult to understand. Subjective   Subjective "I need to go to the bathroom." Pt greeted supine on RA, amenable to skilled PT treatment session. Bed Mobility   Supine to Sit 4  Minimal assistance   Additional items Assist x 1; Increased time required;Verbal cues;LE management;HOB elevated   Sit to Supine 4  Minimal assistance   Additional items Assist x 1;Bedrails; Increased time required;Verbal cues;LE management;HOB elevated   Additional Comments (S)  VC for safe sequencing and hand placement. Inc LE assistance to return supine in bed; clot with cris urine noted in carbajal tubing at end of tx session >RN notified   Transfers   Sit to Stand 5  Supervision   Additional items Bedrails; Increased time required;Verbal cues  (RW for stability)   Stand to Sit 4  Minimal assistance   Additional items Bedrails; Increased time required;Verbal cues   Toilet transfer 5  Supervision   Additional items Verbal cues;Standard toilet; Increased time required  (RW and grab bars)   Additional Comments Pt returned from bathroom to bed via staxi cart requiring min Ax1 for transfer   Ambulation/Elevation   Gait pattern Wide LUPE; Improper Weight shift;R Foot drag;Decreased foot clearance;Decreased R stance; Short stride; Excessively slow;Knees flexed   Gait Assistance 4  Minimal assist   Additional items Assist x 1;Verbal cues   Assistive Device Rolling walker   Distance 15'x1 with RW   Ambulation/Elevation Additional Comments VC for gait mechanics and upright posture, R quad engagement to control R stance with noted dec foot clearance   Balance   Static Sitting Good   Dynamic Sitting Fair +   Static Standing Fair -   Dynamic Standing Poor +   Ambulatory Poor +   Endurance Deficit   Endurance Deficit Yes   Endurance Deficit Description BP at /74, post BP /69   Activity Tolerance   Activity Tolerance Treatment limited secondary to medical complications (Comment)  (Session limited 2/2 BP)   Medical Staff Made Aware NATASHA Ordonez, RN   Nurse Made Aware RN   Assessment   Prognosis Good   Problem List Decreased strength;Decreased endurance; Impaired balance;Decreased coordination;Decreased mobility; Impaired judgement;Decreased safety awareness; Obesity   Assessment PT treatment session limited 2/2 BP at rest of 153/74 at EOB > Pt ambulated to toilet using RW and returned to bed via staxi with minimal change in BP.  Pt demonstrating progress towards functional goals with decreased assistance levels needed for functional mobility, however cont to require cues for gait mechanics and sequencing for safety. Clot with cris colored urine noted in carbajal at end of treatment session > RN notified of BP and urine findings. The patient's AM-PAC Basic Mobility Inpatient Short Form Raw Score is 15. A Raw score of less than or equal to 16 suggests the patient may benefit from discharge to post-acute rehabilitation services. Please also refer to the recommendation of the Physical Therapist for safe discharge planning. Will cont to benefit from skilled IPPT and will continue to follow per PT POC. Cont to recommend level I (max) rehab intensity resources based on pt presentation and functional performance. Barriers to Discharge Decreased caregiver support; Inaccessible home environment   Barriers to Discharge Comments inc assistance required vs. baseline and fall risk   Goals   Patient Goals "Go to the bathroom."   [facility] Expiration Date 12/21/23   Short Term Goal #1 Pt will: Perform bed mobility tasks with supervision to reposition in bed and prepare for transfers. Pt will perform transfers with steadying assistance to decrease burden of care, decrease risk for falls, and improve activity tolerance and prepare for ambulation. Pt will ambulate with LRAD for >/= 76' with steadying assistance to decrease burden of care, decrease risk for falls, improve activity tolerance, and improve gait quality and to access home environment. Pt will complete 1 step with LRAD and >/= 3 steps with bilateral handrails with consistent min A of 1 to decrease burden of care, decrease risk for falls, improve activity tolerance, and improve gait quality. Pt will participate in objective balance assessment to determine baseline fall risk. Pt will participate in SSWS assessment to determine level of mobility. Pt will increase B LE strength >/= 1/2 MMT grade to facilitate functional mobility.    PT Treatment Day 2   Plan   Treatment/Interventions ADL retraining;Functional transfer training;LE strengthening/ROM; Elevations; Therapeutic exercise; Endurance training;Patient/family training;Gait training;Bed mobility; Equipment eval/education; Compensatory technique education;Spoke to nursing;OT   Progress Slow progress, medical status limitations   PT Frequency 4-6x/wk   Discharge Recommendation   Rehab Resource Intensity Level, PT I (Maximum Resource Intensity)   AM-PAC Basic Mobility Inpatient   Turning in Flat Bed Without Bedrails 3   Lying on Back to Sitting on Edge of Flat Bed Without Bedrails 2   Moving Bed to Chair 3   Standing Up From Chair Using Arms 3   Walk in Room 3   Climb 3-5 Stairs With Railing 1   Basic Mobility Inpatient Raw Score 15   Basic Mobility Standardized Score 36.97   Highest Level Of Mobility   JH-HLM Goal 4: Move to chair/commode   JH-HLM Achieved 6: Walk 10 steps or more   End of Consult   Patient Position at End of Consult Supine;Bed/Chair alarm activated; All needs within reach  (HOB 30 degrees > all questions addressed.  RN aware of carbajal clot and BP.)     Cony Scott  4:38 PM  12/11/23

## 2023-12-11 NOTE — ASSESSMENT & PLAN NOTE
Patient is a 15-year-old male with medical history pertinent for hypertension diabetes GERD and BPH presenting with dizziness, increasing weakness and garbled speech. Last seen well 12/5/23 11AM, refused to go to hospital until he fell 12/6/23 w/o injury. Recently tx w/azithro outpt for bronchitis. ER stroke alert called-pressure was 191/89 with aspirin yesterday NIH score of 2 and her CT revealed a small acute left basal ganglia and lateral midbrain IPH mild vasogenic edema. Repeat CT head 1 & 6h without interval IPH changes. No LVO spot signs, no vascular malformations  Repeat CT of head prn for new or worsening neuro deficits  BP goal 120-160  Cardene drip stopped 12/9  Currently on lopressor BID, Norvasc, Losartan, and PRN hydralazine 10mg IV  Elevate HOB 30+ degrees  Neurology is following  MRI 12/7: Stable left thalamic hemorrhage and surrounding vasogenic edema. Foci of chronic microhemorrhage in the left thalamus and left temporal subcortical white matter. 2. No evidence of solid lesion underlying the left thalamic hemorrhage. Of note, NO hemorrhage shock   Consider outpatient MRI in 8 to 12 weeks  Started DVT prophylaxis 12/9/23  PT/OT consulted - recommending rehab  Discussed with neurology on 12/11: continue with current treatment plan - keep BP <160 and go to rehab as PT/OT recommends.

## 2023-12-11 NOTE — PROGRESS NOTES
[address] [facility]  Progress Note  Name: Maria Alejandra Simmons  MRN: [id number]  Unit/Bed#: -01 I Date of Admission: 12/6/2023   Date of Service: 12/11/2023 I Hospital Day: 5    Assessment/Plan   * Intraparenchymal hemorrhage of brain [facility])  Assessment & Plan  Patient is a 75-year-old male with medical history pertinent for hypertension diabetes GERD and BPH presenting with dizziness, increasing weakness and garbled speech. Last seen well 12/5/23 11AM, refused to go to hospital until he fell 12/6/23 w/o injury. Recently tx w/azithro outpt for bronchitis. ER stroke alert called-pressure was 191/89 with aspirin yesterday NIH score of 2 and her CT revealed a small acute left basal ganglia and lateral midbrain IPH mild vasogenic edema. Repeat CT head 1 & 6h without interval IPH changes. No LVO spot signs, no vascular malformations  Repeat CT of head prn for new or worsening neuro deficits  BP goal 120-160  Cardene drip stopped 12/9  Currently on lopressor BID, Norvasc, Losartan, and PRN hydralazine 10mg IV  Elevate HOB 30+ degrees  Neurology is following  MRI 12/7: Stable left thalamic hemorrhage and surrounding vasogenic edema. Foci of chronic microhemorrhage in the left thalamus and left temporal subcortical white matter. 2. No evidence of solid lesion underlying the left thalamic hemorrhage. Of note, NO hemorrhage shock   Consider outpatient MRI in 8 to 12 weeks  Started DVT prophylaxis 12/9/23  PT/OT consulted - recommending rehab  Discussed with neurology on 12/11: continue with current treatment plan - keep BP <160 and go to rehab as PT/OT recommends. Respiratory insufficiency  Assessment & Plan  Patient was noted to be requiring around 3L NC. Is not on oxygen at baseline. CXR: no acute cardiopulmonary disease  No evidence of aspiration at this time  Procal negative and leukocytosis normalized today. Was weaned to room air today. Keep o2 >89%.  Incentive spirometry     Positive blood culture  Assessment & Plan  Patient with positive blood culture 1/2 on 12/6 and 1/2 repeated on 12/8. BC from 12/6 grew: Streptococcus mitis oralis susceptible to rocephin  BC from 12/8 currently growing gram positive rods, blood culture identification panel unable to detect organism  Will repeat blood cultures and reach out to ID regarding BC results. Patient currently not on antibiotics, does have mild leukocytosis. Procal normal  Leukocytosis resolved  Discussed with ID: repeat BC and monitor those. Recommending echo at this time. Hold on abx. Echo: : Left ventricular cavity size is normal. Wall thickness is normal. The left ventricular ejection fraction is 65%. Systolic function is normal. Wall motion is normal. Diastolic function is normal. No stenosis and no vegetations noted    Dysphagia  Assessment & Plan  SLP following  S/p VBS 12/8- Cleared for pureed diet with HTL  Continue to monitor for risk of aspiration     Tachypnea  Assessment & Plan  Tachypnea into 20's-30's on arrival, sats >92% throughout hospital stay, RA->3L->2L currently. May be secondary to acute stress and body habitus. Continue and wean as tolerated, sat goal >94% to ensure optimal brain oxygenation  Hx BASILIA, CPAP qhs    Stage 3a chronic kidney disease [facility])  Assessment & Plan  Lab Results   Component Value Date    EGFR 58 12/11/2023    EGFR 48 12/10/2023    EGFR 50 12/09/2023    CREATININE 1.14 12/11/2023    CREATININE 1.32 (H) 12/10/2023    CREATININE 1.28 12/09/2023     His baseline creatinine is 1.4-1.5 at baseline  Avoid nephrotoxic medications, nsaids, hypotension  Monitor I&Os    Benign prostatic hyperplasia without urinary obstruction  Assessment & Plan  Continue Flomax 0.4 mg p.o.    With carbajal in place, will discuss with urology when patient should have a voiding trial.   Discussed with urology - as patient is not ambulating much, hold on voiding trial, can follow up outpatient for voiding trial or do voiding trial at rehab    Class 3 severe obesity with body mass index (BMI) of 40.0 to 44.9 in adult [facility])  Assessment & Plan  Dietary and lifestyle counseling recommended prior to discharge     Hypertension  Assessment & Plan  Weaned off Cardene 12/9/23  Continue BB, Losartan  Home Norvasc increased to 10mg  May need to increase losartan to keep BP within goal.   Increased losartan to 50 mg qd. Goal -160  Hydralazine PRN     Diabetes mellitus [facility])  Assessment & Plan  Lab Results   Component Value Date    HGBA1C 6.3 (H) 12/07/2023       Recent Labs     12/10/23  1604 12/10/23  2117 12/11/23  0806 12/11/23  1149   POCGLU 108 124 97 120         Blood Sugar Average: Last 72 hrs:  (P) 119.4393384999326633    Insulin sliding scale algorithm 3  Holding home Metformin  Monitor BG          VTE Pharmacologic Prophylaxis:   High Risk (Score >/= 5) - Pharmacological DVT Prophylaxis Ordered: heparin. Sequential Compression Devices Ordered. Mobility:   Basic Mobility Inpatient Raw Score: 12  JH-HLM Goal: 4: Move to chair/commode  JH-HLM Achieved: 4: Move to chair/commode  HLM Goal achieved. Continue to encourage appropriate mobility. Patient Centered Rounds: I performed bedside rounds with nursing staff today. Discussions with Specialists or Other Care Team Provider: nursing, case management, neurology, curbside ID    Education and Discussions with Family / Patient: Updated  (son) via phone. Total Time Spent on Date of Encounter in care of patient: 45 mins. This time was spent on one or more of the following: performing physical exam; counseling and coordination of care; obtaining or reviewing history; documenting in the medical record; reviewing/ordering tests, medications or procedures; communicating with other healthcare professionals and discussing with patient's family/caregivers.     Current Length of Stay: 5 day(s)  Current Patient Status: Inpatient   Certification Statement: The patient will continue to require additional inpatient hospital stay due to monitoring BP, pending blood cultures,   Discharge Plan: Anticipate discharge in 48-72 hrs to rehab facility. Code Status: Level 1 - Full Code    Subjective:   Patient seen and examined. He is doing better today. States that he would like to get up and move around more. He is looking forward to going to rehab and working on his strength and his speech. Denies nausea, vomiting, diarrhea, constipation, abdominal pain, CP, SOB. Is saturating well on room air. Objective:     Vitals:   Temp (24hrs), Av.6 °F (36.4 °C), Min:97.3 °F (36.3 °C), Max:97.9 °F (36.6 °C)    Temp:  [97.3 °F (36.3 °C)-97.9 °F (36.6 °C)] 97.5 °F (36.4 °C)  HR:  [52-66] 57  Resp:  [14-16] 16  BP: (138-173)/(56-78) 151/65  SpO2:  [88 %-95 %] 95 %  Body mass index is 40.49 kg/m². Input and Output Summary (last 24 hours): Intake/Output Summary (Last 24 hours) at 2023 1545  Last data filed at 2023 0843  Gross per 24 hour   Intake 90 ml   Output 700 ml   Net -610 ml       Physical Exam:   Physical Exam  Vitals reviewed. Constitutional:       General: He is not in acute distress. Appearance: He is obese. He is ill-appearing. He is not toxic-appearing. HENT:      Head: Normocephalic. Mouth/Throat:      Mouth: Mucous membranes are moist.   Cardiovascular:      Rate and Rhythm: Normal rate and regular rhythm. Heart sounds: No murmur heard. Pulmonary:      Effort: No respiratory distress. Breath sounds: No stridor. No wheezing. Comments: Saturating well on room air  Abdominal:      General: Bowel sounds are normal. There is no distension. Palpations: Abdomen is soft. There is no mass. Tenderness: There is no abdominal tenderness. Genitourinary:     Comments: [place] with clear yellow urine output, RN sent image of small stringy clot - no further hematuria or clotting noted. First episode. Musculoskeletal:      Right lower leg: No edema. Left lower leg: No edema. Skin:     General: Skin is warm and dry. Neurological:      Mental Status: He is alert and oriented to person, place, and time. Cranial Nerves: Dysarthria and facial asymmetry present. Motor: Weakness (slight right hand weakness with  strength) present. Psychiatric:         Mood and Affect: Mood normal.         Behavior: Behavior normal.          Additional Data:     Labs:  Results from last 7 days   Lab Units 12/11/23  0516 12/10/23  0452   WBC Thousand/uL 9.89 12.24*   HEMOGLOBIN g/dL 14.0 13.1   HEMATOCRIT % 46.1 43.2   PLATELETS Thousands/uL 219 228   NEUTROS PCT %  --  65   LYMPHS PCT %  --  24   MONOS PCT %  --  9   EOS PCT %  --  1     Results from last 7 days   Lab Units 12/11/23  0516 12/07/23  0438 12/06/23  1756   SODIUM mmol/L 141   < > 139   POTASSIUM mmol/L 4.0   < > 4.1   CHLORIDE mmol/L 108   < > 104   CO2 mmol/L 27   < > 29   BUN mg/dL 20   < > 23   CREATININE mg/dL 1.14   < > 1.40*   ANION GAP mmol/L 6   < > 6   CALCIUM mg/dL 8.7   < > 9.2   ALBUMIN g/dL  --   --  4.2   TOTAL BILIRUBIN mg/dL  --   --  0.61   ALK PHOS U/L  --   --  55   ALT U/L  --   --  16   AST U/L  --   --  18   GLUCOSE RANDOM mg/dL 102   < > 128    < > = values in this interval not displayed.      Results from last 7 days   Lab Units 12/06/23  1756   INR  0.98     Results from last 7 days   Lab Units 12/11/23  1149 12/11/23  0806 12/10/23  2117 12/10/23  1604 12/10/23  1115 12/10/23  0727 12/09/23  2115 12/09/23  1624 12/09/23  1128 12/09/23  0721 12/08/23  1728 12/08/23  1310   POC GLUCOSE mg/dl 120 97 124 108 161* 99 114 99 129 118 169* 127     Results from last 7 days   Lab Units 12/07/23  0438   HEMOGLOBIN A1C % 6.3*     Results from last 7 days   Lab Units 12/11/23  0516 12/10/23  0452 12/06/23  1756   LACTIC ACID mmol/L  --   --  1.1   PROCALCITONIN ng/ml 0.06 0.08 <0.05       Lines/Drains:  Invasive Devices       Peripheral Intravenous Line  Duration             Peripheral IV 12/10/23 Dorsal (posterior); Right Hand 1 day              Drain  Duration             Urethral Catheter 16 Fr. 3 days                  Urinary Catheter:  Goal for removal: Voiding trial when ambulation improves               Imaging: Reviewed radiology reports from this admission including: ECHO    Recent Cultures (last 7 days):   Results from last 7 days   Lab Units 12/10/23  1216 12/08/23  0601 12/06/23  1758 12/06/23  1756   BLOOD CULTURE  Received in Microbiology Lab. Culture in Progress. Received in Microbiology Lab. Culture in Progress. No Growth at 72 hrs. Bacillus species NOT anthracis* No Growth After 4 Days.  Streptococcus mitis oralis group*   GRAM STAIN RESULT   --  Gram positive rods*  --  Gram positive cocci in pairs and chains*       Last 24 Hours Medication List:   Current Facility-Administered Medications   Medication Dose Route Frequency Provider Last Rate    albuterol  2 puff Inhalation Q6H PRN Nathaneil Santo, PA-C      amLODIPine  10 mg Oral Daily Nathaneil Santo, PA-C      bisacodyl  10 mg Rectal PRN Nathaneil Santo, PA-C      heparin (porcine)  5,000 Units Subcutaneous Q12H [address] [place] Kenji Laly, PA-C      hydrALAZINE  10 mg Intravenous Q4H PRN Nathaneil Santo, PA-C      insulin lispro  1-6 Units Subcutaneous 4x Daily (AC & HS) Nathaneil Santo, PA-C      levothyroxine  200 mcg Oral Early Morning Nathaneil Snato, PA-C      lidocaine  2 patch Topical Q24H [place], [place]      [START ON 12/12/2023] losartan  50 mg Oral Daily Pili Hopkins, PA-C      metoprolol tartrate  50 mg Oral Q12H [address] [facility], PA-C      ondansetron  4 mg Intravenous Q6H PRN Nathaneil Santo, PA-C      pantoprazole  40 mg Intravenous QAM Nathaneil Santo, PA-C      pneumococcal 20-concha conj vacc  0.5 mL Intramuscular Once PRN Nathaneil Santo, PA-C          Today, Patient Was Seen By: Anya Pereira PA-C    **Please Note: This note may have been constructed using a voice recognition system. **

## 2023-12-11 NOTE — ASSESSMENT & PLAN NOTE
Weaned off Cardene 12/9/23  Continue BB, Losartan  Home Norvasc increased to 10mg  May need to increase losartan to keep BP within goal.   Increased losartan to 50 mg qd.    Goal -160  Hydralazine PRN

## 2023-12-11 NOTE — RESPIRATORY THERAPY NOTE
RT Ventilator Management Note  Ruth Ann Carmichael 78 y.o. male MRN: [id number]  Unit/Bed#: -01 Encounter: [id number]      Daily Screen    No data found in the last 10 encounters. Physical Exam:   Assessment Type: Assess only  General Appearance: Drowsy  Cough: Congested      Resp Comments: Pt placed on cpap for hs    01:30  Pt pulled off bipap mask and indicated he does not want to wear it any more.

## 2023-12-11 NOTE — ASSESSMENT & PLAN NOTE
Continue Flomax 0.4 mg p.o.    With carbajal in place, will discuss with urology when patient should have a voiding trial.   Discussed with urology - as patient is not ambulating much, hold on voiding trial, can follow up outpatient for voiding trial or do voiding trial at rehab

## 2023-12-11 NOTE — ASSESSMENT & PLAN NOTE
Lab Results   Component Value Date    HGBA1C 6.3 (H) 12/07/2023       Recent Labs     12/10/23  1604 12/10/23  2117 12/11/23  0806 12/11/23  1149   POCGLU 108 124 97 120         Blood Sugar Average: Last 72 hrs:  (P) 119.3102575275846651    Insulin sliding scale algorithm 3  Holding home Metformin  Monitor BG

## 2023-12-11 NOTE — ASSESSMENT & PLAN NOTE
Patient was noted to be requiring around 3L NC. Is not on oxygen at baseline. CXR: no acute cardiopulmonary disease  No evidence of aspiration at this time  Procal negative and leukocytosis normalized today. Was weaned to room air today. Keep o2 >89%.  Incentive spirometry

## 2023-12-11 NOTE — ASSESSMENT & PLAN NOTE
Lab Results   Component Value Date    EGFR 58 12/11/2023    EGFR 48 12/10/2023    EGFR 50 12/09/2023    CREATININE 1.14 12/11/2023    CREATININE 1.32 (H) 12/10/2023    CREATININE 1.28 12/09/2023     His baseline creatinine is 1.4-1.5 at baseline  Avoid nephrotoxic medications, nsaids, hypotension  Monitor I&Os

## 2023-12-12 ENCOUNTER — APPOINTMENT (INPATIENT)
Dept: CT IMAGING | Facility: HOSPITAL | Age: 83
End: 2023-12-12
Payer: COMMERCIAL

## 2023-12-12 PROBLEM — G93.41 ACUTE METABOLIC ENCEPHALOPATHY: Status: ACTIVE | Noted: 2023-12-12

## 2023-12-12 PROBLEM — R06.89 RESPIRATORY INSUFFICIENCY: Status: RESOLVED | Noted: 2023-12-11 | Resolved: 2023-12-12

## 2023-12-12 LAB
ALBUMIN SERPL BCP-MCNC: 3.9 G/DL (ref 3.5–5)
ALP SERPL-CCNC: 57 U/L (ref 34–104)
ALT SERPL W P-5'-P-CCNC: 26 U/L (ref 7–52)
ANION GAP SERPL CALCULATED.3IONS-SCNC: 6 MMOL/L
ANION GAP SERPL CALCULATED.3IONS-SCNC: 7 MMOL/L
AST SERPL W P-5'-P-CCNC: 19 U/L (ref 13–39)
BACTERIA BLD CULT: NORMAL
BASE EX.OXY STD BLDV CALC-SCNC: 91.5 % (ref 60–80)
BASE EXCESS BLDV CALC-SCNC: -3 MMOL/L
BASOPHILS # BLD AUTO: 0.05 THOUSANDS/ÂΜL (ref 0–0.1)
BASOPHILS NFR BLD AUTO: 0 % (ref 0–1)
BILIRUB SERPL-MCNC: 0.87 MG/DL (ref 0.2–1)
BUN SERPL-MCNC: 19 MG/DL (ref 5–25)
BUN SERPL-MCNC: 19 MG/DL (ref 5–25)
CALCIUM SERPL-MCNC: 8.7 MG/DL (ref 8.4–10.2)
CALCIUM SERPL-MCNC: 8.8 MG/DL (ref 8.4–10.2)
CARDIAC TROPONIN I PNL SERPL HS: 9 NG/L (ref 8–18)
CHLORIDE SERPL-SCNC: 105 MMOL/L (ref 96–108)
CHLORIDE SERPL-SCNC: 106 MMOL/L (ref 96–108)
CO2 SERPL-SCNC: 26 MMOL/L (ref 21–32)
CO2 SERPL-SCNC: 28 MMOL/L (ref 21–32)
CREAT SERPL-MCNC: 1.13 MG/DL (ref 0.6–1.3)
CREAT SERPL-MCNC: 1.13 MG/DL (ref 0.6–1.3)
EOSINOPHIL # BLD AUTO: 0.13 THOUSAND/ÂΜL (ref 0–0.61)
EOSINOPHIL NFR BLD AUTO: 1 % (ref 0–6)
ERYTHROCYTE [DISTWIDTH] IN BLOOD BY AUTOMATED COUNT: 15 % (ref 11.6–15.1)
ERYTHROCYTE [DISTWIDTH] IN BLOOD BY AUTOMATED COUNT: 15.2 % (ref 11.6–15.1)
GFR SERPL CREATININE-BSD FRML MDRD: 58 ML/MIN/1.73SQ M
GFR SERPL CREATININE-BSD FRML MDRD: 58 ML/MIN/1.73SQ M
GLUCOSE SERPL-MCNC: 105 MG/DL (ref 65–140)
GLUCOSE SERPL-MCNC: 106 MG/DL (ref 65–140)
GLUCOSE SERPL-MCNC: 107 MG/DL (ref 65–140)
GLUCOSE SERPL-MCNC: 110 MG/DL (ref 65–140)
GLUCOSE SERPL-MCNC: 125 MG/DL (ref 65–140)
GLUCOSE SERPL-MCNC: 99 MG/DL (ref 65–140)
HCO3 BLDV-SCNC: 21.1 MMOL/L (ref 24–30)
HCT VFR BLD AUTO: 43.3 % (ref 36.5–49.3)
HCT VFR BLD AUTO: 43.7 % (ref 36.5–49.3)
HGB BLD-MCNC: 13.4 G/DL (ref 12–17)
HGB BLD-MCNC: 13.4 G/DL (ref 12–17)
IMM GRANULOCYTES # BLD AUTO: 0.05 THOUSAND/UL (ref 0–0.2)
IMM GRANULOCYTES NFR BLD AUTO: 0 % (ref 0–2)
LACTATE SERPL-SCNC: 0.7 MMOL/L (ref 0.5–2)
LYMPHOCYTES # BLD AUTO: 3.04 THOUSANDS/ÂΜL (ref 0.6–4.47)
LYMPHOCYTES NFR BLD AUTO: 26 % (ref 14–44)
MAGNESIUM SERPL-MCNC: 2.1 MG/DL (ref 1.9–2.7)
MCH RBC QN AUTO: 26.3 PG (ref 26.8–34.3)
MCH RBC QN AUTO: 26.5 PG (ref 26.8–34.3)
MCHC RBC AUTO-ENTMCNC: 30.7 G/DL (ref 31.4–37.4)
MCHC RBC AUTO-ENTMCNC: 30.9 G/DL (ref 31.4–37.4)
MCV RBC AUTO: 86 FL (ref 82–98)
MCV RBC AUTO: 86 FL (ref 82–98)
MONOCYTES # BLD AUTO: 1.1 THOUSAND/ÂΜL (ref 0.17–1.22)
MONOCYTES NFR BLD AUTO: 9 % (ref 4–12)
NEUTROPHILS # BLD AUTO: 7.41 THOUSANDS/ÂΜL (ref 1.85–7.62)
NEUTS SEG NFR BLD AUTO: 64 % (ref 43–75)
NRBC BLD AUTO-RTO: 0 /100 WBCS
O2 CT BLDV-SCNC: 15.3 ML/DL
PCO2 BLDV: 34.6 MM HG (ref 42–50)
PH BLDV: 7.4 [PH] (ref 7.3–7.4)
PLATELET # BLD AUTO: 239 THOUSANDS/UL (ref 149–390)
PLATELET # BLD AUTO: 240 THOUSANDS/UL (ref 149–390)
PMV BLD AUTO: 10.1 FL (ref 8.9–12.7)
PMV BLD AUTO: 9.7 FL (ref 8.9–12.7)
PO2 BLDV: 64.8 MM HG (ref 35–45)
POTASSIUM SERPL-SCNC: 4 MMOL/L (ref 3.5–5.3)
POTASSIUM SERPL-SCNC: 4 MMOL/L (ref 3.5–5.3)
PROT SERPL-MCNC: 7.5 G/DL (ref 6.4–8.4)
QRS AXIS: -37 DEGREES
QRSD INTERVAL: 154 MS
QT INTERVAL: 454 MS
QTC INTERVAL: 503 MS
RBC # BLD AUTO: 5.06 MILLION/UL (ref 3.88–5.62)
RBC # BLD AUTO: 5.09 MILLION/UL (ref 3.88–5.62)
SODIUM SERPL-SCNC: 139 MMOL/L (ref 135–147)
SODIUM SERPL-SCNC: 139 MMOL/L (ref 135–147)
T WAVE AXIS: 43 DEGREES
VENTRICULAR RATE: 74 BPM
WBC # BLD AUTO: 11.78 THOUSAND/UL (ref 4.31–10.16)
WBC # BLD AUTO: 12.03 THOUSAND/UL (ref 4.31–10.16)

## 2023-12-12 PROCEDURE — 99447 NTRPROF PH1/NTRNET/EHR 11-20: CPT | Performed by: PHYSICAL MEDICINE & REHABILITATION

## 2023-12-12 PROCEDURE — 94660 CPAP INITIATION&MGMT: CPT

## 2023-12-12 PROCEDURE — 85025 COMPLETE CBC W/AUTO DIFF WBC: CPT | Performed by: PHYSICIAN ASSISTANT

## 2023-12-12 PROCEDURE — NC001 PR NO CHARGE: Performed by: PHYSICIAN ASSISTANT

## 2023-12-12 PROCEDURE — 94668 MNPJ CHEST WALL SBSQ: CPT

## 2023-12-12 PROCEDURE — G1004 CDSM NDSC: HCPCS

## 2023-12-12 PROCEDURE — 80053 COMPREHEN METABOLIC PANEL: CPT | Performed by: PHYSICIAN ASSISTANT

## 2023-12-12 PROCEDURE — 99232 SBSQ HOSP IP/OBS MODERATE 35: CPT

## 2023-12-12 PROCEDURE — 82805 BLOOD GASES W/O2 SATURATION: CPT | Performed by: PHYSICIAN ASSISTANT

## 2023-12-12 PROCEDURE — 82948 REAGENT STRIP/BLOOD GLUCOSE: CPT

## 2023-12-12 PROCEDURE — 85027 COMPLETE CBC AUTOMATED: CPT

## 2023-12-12 PROCEDURE — 84484 ASSAY OF TROPONIN QUANT: CPT | Performed by: PHYSICIAN ASSISTANT

## 2023-12-12 PROCEDURE — 80048 BASIC METABOLIC PNL TOTAL CA: CPT

## 2023-12-12 PROCEDURE — 83605 ASSAY OF LACTIC ACID: CPT | Performed by: PHYSICIAN ASSISTANT

## 2023-12-12 PROCEDURE — 83735 ASSAY OF MAGNESIUM: CPT

## 2023-12-12 PROCEDURE — 93005 ELECTROCARDIOGRAM TRACING: CPT

## 2023-12-12 PROCEDURE — 70450 CT HEAD/BRAIN W/O DYE: CPT

## 2023-12-12 PROCEDURE — 94760 N-INVAS EAR/PLS OXIMETRY 1: CPT

## 2023-12-12 RX ORDER — HYDRALAZINE HYDROCHLORIDE 20 MG/ML
10 INJECTION INTRAMUSCULAR; INTRAVENOUS ONCE
Status: COMPLETED | OUTPATIENT
Start: 2023-12-12 | End: 2023-12-12

## 2023-12-12 RX ORDER — LOSARTAN POTASSIUM 50 MG/1
100 TABLET ORAL DAILY
Status: DISCONTINUED | OUTPATIENT
Start: 2023-12-12 | End: 2023-12-14 | Stop reason: HOSPADM

## 2023-12-12 RX ORDER — OLANZAPINE 10 MG/2ML
5 INJECTION, POWDER, FOR SOLUTION INTRAMUSCULAR ONCE
Status: DISCONTINUED | OUTPATIENT
Start: 2023-12-12 | End: 2023-12-12

## 2023-12-12 RX ORDER — LABETALOL HYDROCHLORIDE 5 MG/ML
10 INJECTION, SOLUTION INTRAVENOUS ONCE
Status: COMPLETED | OUTPATIENT
Start: 2023-12-12 | End: 2023-12-12

## 2023-12-12 RX ORDER — HYDRALAZINE HYDROCHLORIDE 20 MG/ML
INJECTION INTRAMUSCULAR; INTRAVENOUS CODE/TRAUMA/SEDATION MEDICATION
Status: COMPLETED | OUTPATIENT
Start: 2023-12-12 | End: 2023-12-12

## 2023-12-12 RX ORDER — DIPHENHYDRAMINE HYDROCHLORIDE 50 MG/ML
25 INJECTION INTRAMUSCULAR; INTRAVENOUS ONCE
Status: DISCONTINUED | OUTPATIENT
Start: 2023-12-12 | End: 2023-12-12

## 2023-12-12 RX ADMIN — HYDRALAZINE HYDROCHLORIDE 10 MG: 20 INJECTION INTRAMUSCULAR; INTRAVENOUS at 07:14

## 2023-12-12 RX ADMIN — HYDRALAZINE HYDROCHLORIDE 10 MG: 20 INJECTION INTRAMUSCULAR; INTRAVENOUS at 05:52

## 2023-12-12 RX ADMIN — LOSARTAN POTASSIUM 100 MG: 50 TABLET, FILM COATED ORAL at 09:19

## 2023-12-12 RX ADMIN — HYDRALAZINE HYDROCHLORIDE 10 MG: 20 INJECTION INTRAMUSCULAR; INTRAVENOUS at 05:42

## 2023-12-12 RX ADMIN — HEPARIN SODIUM 5000 UNITS: 5000 INJECTION INTRAVENOUS; SUBCUTANEOUS at 09:19

## 2023-12-12 RX ADMIN — Medication 10 MG: at 22:10

## 2023-12-12 RX ADMIN — METOPROLOL TARTRATE 50 MG: 50 TABLET, FILM COATED ORAL at 09:19

## 2023-12-12 RX ADMIN — LIDOCAINE 5% 2 PATCH: 700 PATCH TOPICAL at 20:07

## 2023-12-12 RX ADMIN — AMLODIPINE BESYLATE 10 MG: 10 TABLET ORAL at 09:19

## 2023-12-12 RX ADMIN — METOPROLOL TARTRATE 50 MG: 50 TABLET, FILM COATED ORAL at 20:11

## 2023-12-12 RX ADMIN — HYDRALAZINE HYDROCHLORIDE 10 MG: 20 INJECTION INTRAMUSCULAR; INTRAVENOUS at 20:56

## 2023-12-12 RX ADMIN — HEPARIN SODIUM 5000 UNITS: 5000 INJECTION INTRAVENOUS; SUBCUTANEOUS at 20:11

## 2023-12-12 NOTE — ASSESSMENT & PLAN NOTE
Patient is a 24-year-old male with medical history pertinent for hypertension diabetes GERD and BPH presenting with dizziness, increasing weakness and garbled speech. Last seen well 12/5/23 11AM, refused to go to hospital until he fell 12/6/23 w/o injury. Recently tx w/azithro outpt for bronchitis. ER stroke alert called-pressure was 191/89 with aspirin yesterday NIH score of 2 and her CT revealed a small acute left basal ganglia and lateral midbrain IPH mild vasogenic edema. Repeat CT head 1 & 6h without interval IPH changes. No LVO spot signs, no vascular malformations  Repeat CT of head prn for new or worsening neuro deficits  BP goal 120-160  Cardene drip stopped 12/9  Currently on lopressor BID, Norvasc, Losartan, and PRN hydralazine 10mg IV  Elevate HOB 30+ degrees  Neurology is following  MRI 12/7: Stable left thalamic hemorrhage and surrounding vasogenic edema. Foci of chronic microhemorrhage in the left thalamus and left temporal subcortical white matter. 2. No evidence of solid lesion underlying the left thalamic hemorrhage. Of note, NO hemorrhage shock   Consider outpatient MRI in 8 to 12 weeks  Started DVT prophylaxis 12/9/23  PT/OT consulted - recommending rehab  Discussed with neurology on 12/11: continue with current treatment plan - keep BP <160 and go to rehab as PT/OT recommends. Was RRT in AM of 12/12 for confusion, repeat CT head: Stable left thalamic hematoma extending into the cerebral peduncle, BP was elevated, labs unremarkable. Critical care ordered hydralazine IV   Increased BP, patient's mental status returned to baseline shortly after. Discussed with neuro, recommending normotension for BP.

## 2023-12-12 NOTE — ASSESSMENT & PLAN NOTE
Lab Results   Component Value Date    EGFR 58 12/12/2023    EGFR 58 12/12/2023    EGFR 58 12/11/2023    CREATININE 1.13 12/12/2023    CREATININE 1.13 12/12/2023    CREATININE 1.14 12/11/2023     His baseline creatinine is 1.4-1.5 at baseline  Avoid nephrotoxic medications, nsaids, hypotension  Monitor I&Os independent

## 2023-12-12 NOTE — PLAN OF CARE
Problem: Potential for Falls  Goal: Patient will remain free of falls  Description: INTERVENTIONS:  - Educate patient/family on patient safety including physical limitations  - Instruct patient to call for assistance with activity   - Consult OT/PT to assist with strengthening/mobility   - Keep Call bell within reach  - Keep bed low and locked with side rails adjusted as appropriate  - Keep care items and personal belongings within reach  - Initiate and maintain comfort rounds  - Make Fall Risk Sign visible to staff  - Offer Toileting every 2 Hours, in advance of need if unable to reposition self  - Initiate/Maintain bed/chair alarm  - Apply yellow socks and bracelet for high fall risk patients  - Consider moving patient to room near nurses station  Outcome: Progressing     Problem: PAIN - ADULT  Goal: Verbalizes/displays adequate comfort level or baseline comfort level  Description: Interventions:  - Encourage patient to monitor pain and request assistance  - Assess pain using appropriate pain scale  - Administer analgesics based on type and severity of pain and evaluate response  - Implement non-pharmacological measures as appropriate and evaluate response  - Consider cultural and social influences on pain and pain management  - Notify physician/advanced practitioner if interventions unsuccessful or patient reports new pain  Outcome: Progressing     Problem: INFECTION - ADULT  Goal: Absence or prevention of progression during hospitalization  Description: INTERVENTIONS:  - Assess and monitor for signs and symptoms of infection  - Monitor lab/diagnostic results  - Monitor all insertion sites, i.e. indwelling lines, tubes, and drains  - Monitor endotracheal if appropriate and nasal secretions for changes in amount and color  - [place] appropriate cooling/warming therapies per order  - Administer medications as ordered  - Instruct and encourage patient and family to use good hand hygiene technique  - Identify and instruct in appropriate isolation precautions for identified infection/condition  Outcome: Progressing     Problem: SAFETY ADULT  Goal: Patient will remain free of falls  Description: INTERVENTIONS:  - Educate patient/family on patient safety including physical limitations  - Instruct patient to call for assistance with activity   - Consult OT/PT to assist with strengthening/mobility   - Keep Call bell within reach  - Keep bed low and locked with side rails adjusted as appropriate  - Keep care items and personal belongings within reach  - Initiate and maintain comfort rounds  - Make Fall Risk Sign visible to staff  - Offer Toileting every 2 Hours, in advance of need if unable to reposition self  - Initiate/Maintain bed/chair alarm  - Apply yellow socks and bracelet for high fall risk patients  - Consider moving patient to room near nurses station  Outcome: Progressing  Goal: Maintain or return to baseline ADL function  Description: INTERVENTIONS:  -  Assess patient's ability to carry out ADLs; assess patient's baseline for ADL function and identify physical deficits which impact ability to perform ADLs (bathing, care of mouth/teeth, toileting, grooming, dressing, etc.)  - Assess/evaluate cause of self-care deficits   - Assess range of motion  - Assess patient's mobility; develop plan if impaired  - Assess patient's need for assistive devices and provide as appropriate  - Encourage maximum independence but intervene and supervise when necessary  - Involve family in performance of ADLs  - Assess for home care needs following discharge   - Consider OT consult to assist with ADL evaluation and planning for discharge  - Provide patient education as appropriate  Outcome: Progressing  Goal: Maintains/Returns to pre admission functional level  Description: INTERVENTIONS:  - Perform AM-PAC 6 Click Basic Mobility/ Daily Activity assessment daily.  - Set and communicate daily mobility goal to care team and patient/family/caregiver. - Collaborate with rehabilitation services on mobility goals if consulted  - Perform Range of Motion 3 times a day. - Reposition patient every 2 hours if pt unable to reposition self  - Record patient progress and toleration of activity level   Outcome: Progressing     Problem: DISCHARGE PLANNING  Goal: Discharge to home or other facility with appropriate resources  Description: INTERVENTIONS:  - Identify barriers to discharge w/patient and caregiver  - Arrange for needed discharge resources and transportation as appropriate  - Identify discharge learning needs (meds, wound care, etc.)  - Arrange for interpretive services to assist at discharge as needed  - Refer to Case Management Department for coordinating discharge planning if the patient needs post-hospital services based on physician/advanced practitioner order or complex needs related to functional status, cognitive ability, or social support system  Outcome: Progressing     Problem: Knowledge Deficit  Goal: Patient/family/caregiver demonstrates understanding of disease process, treatment plan, medications, and discharge instructions  Description: Complete learning assessment and assess knowledge base.   Interventions:  - Provide teaching at level of understanding  - Provide teaching via preferred learning methods  Outcome: Progressing     Problem: NEUROSENSORY - ADULT  Goal: Achieves stable or improved neurological status  Description: INTERVENTIONS  - Monitor and report changes in neurological status  - Monitor vital signs such as temperature, blood pressure, glucose, and any other labs ordered   - Initiate measures to prevent increased intracranial pressure  - Monitor for seizure activity and implement precautions if appropriate      Outcome: Progressing  Goal: Remains free of injury related to seizures activity  Description: INTERVENTIONS  - Maintain airway, patient safety  and administer oxygen as ordered  - Monitor patient for seizure activity, document and report duration and description of seizure to physician/advanced practitioner  - If seizure occurs,  ensure patient safety during seizure  - Reorient patient post seizure  - Seizure pads on all 4 side rails  - Instruct patient/family to notify RN of any seizure activity including if an aura is experienced  - Instruct patient/family to call for assistance with activity based on nursing assessment  - Administer anti-seizure medications if ordered    Outcome: Progressing  Goal: Achieves maximal functionality and self care  Description: INTERVENTIONS  - Monitor swallowing and airway patency with patient fatigue and changes in neurological status  - Encourage and assist patient to increase activity and self care. - Encourage visually impaired, hearing impaired and aphasic patients to use assistive/communication devices  Outcome: Progressing     Problem: Prexisting or High Potential for Compromised Skin Integrity  Goal: Skin integrity is maintained or improved  Description: INTERVENTIONS:  - Identify patients at risk for skin breakdown  - Assess and monitor skin integrity  - Assess and monitor nutrition and hydration status  - Monitor labs   - Assess for incontinence   - Turn and reposition patient  - Assist with mobility/ambulation  - Relieve pressure over bony prominences  - Avoid friction and shearing  - Provide appropriate hygiene as needed including keeping skin clean and dry  - Evaluate need for skin moisturizer/barrier cream  - Collaborate with interdisciplinary team   - Patient/family teaching  - Consider wound care consult   Outcome: Progressing     Problem: Nutrition/Hydration-ADULT  Goal: Nutrient/Hydration intake appropriate for improving, restoring or maintaining nutritional needs  Description: Monitor and assess patient's nutrition/hydration status for malnutrition. Collaborate with interdisciplinary team and initiate plan and interventions as ordered.   Monitor patient's weight and dietary intake as ordered or per policy. Utilize nutrition screening tool and intervene as necessary. Determine patient's food preferences and provide high-protein, high-caloric foods as appropriate.      INTERVENTIONS:  - Monitor oral intake, urinary output, labs, and treatment plans  - Assess nutrition and hydration status and recommend course of action  - Evaluate amount of meals eaten  - Assist patient with eating if necessary   - Allow adequate time for meals  - Recommend/ encourage appropriate diets, oral nutritional supplements, and vitamin/mineral supplements  - Order, calculate, and assess calorie counts as needed  - Recommend, monitor, and adjust tube feedings and TPN/PPN based on assessed needs  - Assess need for intravenous fluids  - Provide specific nutrition/hydration education as appropriate  - Include patient/family/caregiver in decisions related to nutrition  Outcome: Progressing

## 2023-12-12 NOTE — PLAN OF CARE
Problem: Potential for Falls  Goal: Patient will remain free of falls  Description: INTERVENTIONS:  - Educate patient/family on patient safety including physical limitations  - Instruct patient to call for assistance with activity   - Consult OT/PT to assist with strengthening/mobility   - Keep Call bell within reach  - Keep bed low and locked with side rails adjusted as appropriate  - Keep care items and personal belongings within reach  - Initiate and maintain comfort rounds  - Make Fall Risk Sign visible to staff  - Offer Toileting every 2 Hours, in advance of need if unable to reposition self  - Initiate/Maintain bed/chair alarm  - Apply yellow socks and bracelet for high fall risk patients  - Consider moving patient to room near nurses station  Outcome: Progressing     Problem: PAIN - ADULT  Goal: Verbalizes/displays adequate comfort level or baseline comfort level  Description: Interventions:  - Encourage patient to monitor pain and request assistance  - Assess pain using appropriate pain scale  - Administer analgesics based on type and severity of pain and evaluate response  - Implement non-pharmacological measures as appropriate and evaluate response  - Consider cultural and social influences on pain and pain management  - Notify physician/advanced practitioner if interventions unsuccessful or patient reports new pain  Outcome: Progressing     Problem: INFECTION - ADULT  Goal: Absence or prevention of progression during hospitalization  Description: INTERVENTIONS:  - Assess and monitor for signs and symptoms of infection  - Monitor lab/diagnostic results  - Monitor all insertion sites, i.e. indwelling lines, tubes, and drains  - Monitor endotracheal if appropriate and nasal secretions for changes in amount and color  - [place] appropriate cooling/warming therapies per order  - Administer medications as ordered  - Instruct and encourage patient and family to use good hand hygiene technique  - Identify and instruct in appropriate isolation precautions for identified infection/condition  Outcome: Progressing     Problem: SAFETY ADULT  Goal: Patient will remain free of falls  Description: INTERVENTIONS:  - Educate patient/family on patient safety including physical limitations  - Instruct patient to call for assistance with activity   - Consult OT/PT to assist with strengthening/mobility   - Keep Call bell within reach  - Keep bed low and locked with side rails adjusted as appropriate  - Keep care items and personal belongings within reach  - Initiate and maintain comfort rounds  - Make Fall Risk Sign visible to staff  - Offer Toileting every 2 Hours, in advance of need if unable to reposition self  - Initiate/Maintain bed/chair alarm  - Apply yellow socks and bracelet for high fall risk patients  - Consider moving patient to room near nurses station  Outcome: Progressing  Goal: Maintain or return to baseline ADL function  Description: INTERVENTIONS:  -  Assess patient's ability to carry out ADLs; assess patient's baseline for ADL function and identify physical deficits which impact ability to perform ADLs (bathing, care of mouth/teeth, toileting, grooming, dressing, etc.)  - Assess/evaluate cause of self-care deficits   - Assess range of motion  - Assess patient's mobility; develop plan if impaired  - Assess patient's need for assistive devices and provide as appropriate  - Encourage maximum independence but intervene and supervise when necessary  - Involve family in performance of ADLs  - Assess for home care needs following discharge   - Consider OT consult to assist with ADL evaluation and planning for discharge  - Provide patient education as appropriate  Outcome: Progressing  Goal: Maintains/Returns to pre admission functional level  Description: INTERVENTIONS:  - Perform AM-PAC 6 Click Basic Mobility/ Daily Activity assessment daily.  - Set and communicate daily mobility goal to care team and patient/family/caregiver. - Collaborate with rehabilitation services on mobility goals if consulted  - Perform Range of Motion 3 times a day. - Reposition patient every 2 hours if pt unable to reposition self  - Record patient progress and toleration of activity level   Outcome: Progressing     Problem: DISCHARGE PLANNING  Goal: Discharge to home or other facility with appropriate resources  Description: INTERVENTIONS:  - Identify barriers to discharge w/patient and caregiver  - Arrange for needed discharge resources and transportation as appropriate  - Identify discharge learning needs (meds, wound care, etc.)  - Arrange for interpretive services to assist at discharge as needed  - Refer to Case Management Department for coordinating discharge planning if the patient needs post-hospital services based on physician/advanced practitioner order or complex needs related to functional status, cognitive ability, or social support system  Outcome: Progressing     Problem: Knowledge Deficit  Goal: Patient/family/caregiver demonstrates understanding of disease process, treatment plan, medications, and discharge instructions  Description: Complete learning assessment and assess knowledge base.   Interventions:  - Provide teaching at level of understanding  - Provide teaching via preferred learning methods  Outcome: Progressing     Problem: NEUROSENSORY - ADULT  Goal: Achieves stable or improved neurological status  Description: INTERVENTIONS  - Monitor and report changes in neurological status  - Monitor vital signs such as temperature, blood pressure, glucose, and any other labs ordered   - Initiate measures to prevent increased intracranial pressure  - Monitor for seizure activity and implement precautions if appropriate      Outcome: Progressing  Goal: Remains free of injury related to seizures activity  Description: INTERVENTIONS  - Maintain airway, patient safety  and administer oxygen as ordered  - Monitor patient for seizure activity, document and report duration and description of seizure to physician/advanced practitioner  - If seizure occurs,  ensure patient safety during seizure  - Reorient patient post seizure  - Seizure pads on all 4 side rails  - Instruct patient/family to notify RN of any seizure activity including if an aura is experienced  - Instruct patient/family to call for assistance with activity based on nursing assessment  - Administer anti-seizure medications if ordered    Outcome: Progressing  Goal: Achieves maximal functionality and self care  Description: INTERVENTIONS  - Monitor swallowing and airway patency with patient fatigue and changes in neurological status  - Encourage and assist patient to increase activity and self care. - Encourage visually impaired, hearing impaired and aphasic patients to use assistive/communication devices  Outcome: Progressing     Problem: Prexisting or High Potential for Compromised Skin Integrity  Goal: Skin integrity is maintained or improved  Description: INTERVENTIONS:  - Identify patients at risk for skin breakdown  - Assess and monitor skin integrity  - Assess and monitor nutrition and hydration status  - Monitor labs   - Assess for incontinence   - Turn and reposition patient  - Assist with mobility/ambulation  - Relieve pressure over bony prominences  - Avoid friction and shearing  - Provide appropriate hygiene as needed including keeping skin clean and dry  - Evaluate need for skin moisturizer/barrier cream  - Collaborate with interdisciplinary team   - Patient/family teaching  - Consider wound care consult   Outcome: Progressing     Problem: Nutrition/Hydration-ADULT  Goal: Nutrient/Hydration intake appropriate for improving, restoring or maintaining nutritional needs  Description: Monitor and assess patient's nutrition/hydration status for malnutrition. Collaborate with interdisciplinary team and initiate plan and interventions as ordered.   Monitor patient's weight and dietary intake as ordered or per policy. Utilize nutrition screening tool and intervene as necessary. Determine patient's food preferences and provide high-protein, high-caloric foods as appropriate.      INTERVENTIONS:  - Monitor oral intake, urinary output, labs, and treatment plans  - Assess nutrition and hydration status and recommend course of action  - Evaluate amount of meals eaten  - Assist patient with eating if necessary   - Allow adequate time for meals  - Recommend/ encourage appropriate diets, oral nutritional supplements, and vitamin/mineral supplements  - Order, calculate, and assess calorie counts as needed  - Recommend, monitor, and adjust tube feedings and TPN/PPN based on assessed needs  - Assess need for intravenous fluids  - Provide specific nutrition/hydration education as appropriate  - Include patient/family/caregiver in decisions related to nutrition  Outcome: Progressing

## 2023-12-12 NOTE — ASSESSMENT & PLAN NOTE
Weaned off Cardene 12/9/23  Continue BB, Losartan  Home Norvasc increased to 10mg  May need to increase losartan to keep BP within goal.   Increased losartan to 100 mg qd as patient's BP was increasing back to 160s and above.    Goal SBP is normotension at this time  Hydralazine PRN

## 2023-12-12 NOTE — PROGRESS NOTES
[address] [facility]  Progress Note  Name: Carl To  MRN: [id number]  Unit/Bed#: -01 I Date of Admission: 12/6/2023   Date of Service: 12/12/2023 I Hospital Day: 6    Assessment/Plan   * Intraparenchymal hemorrhage of brain [facility])  Assessment & Plan  Patient is a 78-year-old male with medical history pertinent for hypertension diabetes GERD and BPH presenting with dizziness, increasing weakness and garbled speech. Last seen well 12/5/23 11AM, refused to go to hospital until he fell 12/6/23 w/o injury. Recently tx w/azithro outpt for bronchitis. ER stroke alert called-pressure was 191/89 with aspirin yesterday NIH score of 2 and her CT revealed a small acute left basal ganglia and lateral midbrain IPH mild vasogenic edema. Repeat CT head 1 & 6h without interval IPH changes. No LVO spot signs, no vascular malformations  Repeat CT of head prn for new or worsening neuro deficits  BP goal 120-160  Cardene drip stopped 12/9  Currently on lopressor BID, Norvasc, Losartan, and PRN hydralazine 10mg IV  Elevate HOB 30+ degrees  Neurology is following  MRI 12/7: Stable left thalamic hemorrhage and surrounding vasogenic edema. Foci of chronic microhemorrhage in the left thalamus and left temporal subcortical white matter. 2. No evidence of solid lesion underlying the left thalamic hemorrhage. Of note, NO hemorrhage shock   Consider outpatient MRI in 8 to 12 weeks  Started DVT prophylaxis 12/9/23  PT/OT consulted - recommending rehab  Discussed with neurology on 12/11: continue with current treatment plan - keep BP <160 and go to rehab as PT/OT recommends. Was RRT in AM of 12/12 for confusion, repeat CT head: Stable left thalamic hematoma extending into the cerebral peduncle, BP was elevated, labs unremarkable. Critical care ordered hydralazine IV   Increased BP, patient's mental status returned to baseline shortly after. Discussed with neuro, recommending normotension for BP.      Positive blood culture  Assessment & Plan  Patient with positive blood culture 1/2 on 12/6 and 1/2 repeated on 12/8. BC from 12/6 grew: Streptococcus mitis oralis susceptible to rocephin  BC from 12/8 currently growing gram positive rods, blood culture identification panel unable to detect organism  Will repeat blood cultures and reach out to ID regarding BC results. Patient currently not on antibiotics, does have mild leukocytosis. Procal normal  Leukocytosis resolved  Discussed with ID: repeat BC and monitor those. Recommending echo at this time. Hold on abx. Echo: : Left ventricular cavity size is normal. Wall thickness is normal. The left ventricular ejection fraction is 65%. Systolic function is normal. Wall motion is normal. Diastolic function is normal. No stenosis and no vegetations noted  Repeat BC on 12/10 no growth at 24 hours    Dysphagia  Assessment & Plan  SLP following  S/p VBS 12/8- Cleared for pureed diet with HTL  Continue to monitor for risk of aspiration     Tachypnea  Assessment & Plan  Tachypnea into 20's-30's on arrival, sats >92% throughout hospital stay, RA->3L->2L currently. May be secondary to acute stress and body habitus. Continue and wean as tolerated, sat goal >94% to ensure optimal brain oxygenation  Hx BASILIA, CPAP qhs    Stage 3a chronic kidney disease [facility])  Assessment & Plan  Lab Results   Component Value Date    EGFR 58 12/12/2023    EGFR 58 12/12/2023    EGFR 58 12/11/2023    CREATININE 1.13 12/12/2023    CREATININE 1.13 12/12/2023    CREATININE 1.14 12/11/2023     His baseline creatinine is 1.4-1.5 at baseline  Avoid nephrotoxic medications, nsaids, hypotension  Monitor I&Os    Benign prostatic hyperplasia without urinary obstruction  Assessment & Plan  Continue Flomax 0.4 mg p.o.    With carbajal in place, will discuss with urology when patient should have a voiding trial.   Discussed with urology - as patient is not ambulating much, hold on voiding trial, can follow up outpatient for voiding trial or do voiding trial at rehab    Class 3 severe obesity with body mass index (BMI) of 40.0 to 44.9 in adult [facility])  Assessment & Plan  Dietary and lifestyle counseling recommended prior to discharge     Hypertension  Assessment & Plan  Weaned off Cardene 12/9/23  Continue BB, Losartan  Home Norvasc increased to 10mg  May need to increase losartan to keep BP within goal.   Increased losartan to 100 mg qd as patient's BP was increasing back to 160s and above. Goal SBP is normotension at this time  Hydralazine PRN     Diabetes mellitus [facility])  Assessment & Plan  Lab Results   Component Value Date    HGBA1C 6.3 (H) 12/07/2023       Recent Labs     12/11/23 2029 12/12/23  0547 12/12/23  1059 12/12/23  1611   POCGLU 125 105 125 99         Blood Sugar Average: Last 72 hrs:  (P) 536.6286437014451709    Insulin sliding scale algorithm 3  Holding home Metformin  Monitor BG     Respiratory insufficiency-resolved as of 12/12/2023  Assessment & Plan  Patient was noted to be requiring around 3L NC. Is not on oxygen at baseline. CXR: no acute cardiopulmonary disease  No evidence of aspiration at this time  Procal negative and leukocytosis normalized today. Was weaned to room air today. Keep o2 >89%. Incentive spirometry          VTE Pharmacologic Prophylaxis:   High Risk (Score >/= 5) - Pharmacological DVT Prophylaxis Ordered: heparin. Sequential Compression Devices Ordered. Mobility:   Basic Mobility Inpatient Raw Score: 11  JH-HLM Goal: 4: Move to chair/commode  JH-HLM Achieved: 4: Move to chair/commode  HLM Goal achieved. Continue to encourage appropriate mobility. Patient Centered Rounds: I performed bedside rounds with nursing staff today. Discussions with Specialists or Other Care Team Provider: nursing, case management, neurology    Education and Discussions with Family / Patient: Updated  (son) via phone. Total Time Spent on Date of Encounter in care of patient: 45 mins.  This time was spent on one or more of the following: performing physical exam; counseling and coordination of care; obtaining or reviewing history; documenting in the medical record; reviewing/ordering tests, medications or procedures; communicating with other healthcare professionals and discussing with patient's family/caregivers. Current Length of Stay: 6 day(s)  Current Patient Status: Inpatient   Certification Statement: The patient will continue to require additional inpatient hospital stay due to monitoring BP, monitoring BC  Discharge Plan: Anticipate discharge in 48-72 hrs to rehab facility. Code Status: Level 1 - Full Code    Subjective:   Patient seen and examined today. He offers no complaints. He isn't sure why he was confused this morning. Overall feeling well right now. He would like to try and get up and move more. Denies CP, SOB, fevers, chills, nausea, vomiting. Objective:     Vitals:   Temp (24hrs), Av.8 °F (36.6 °C), Min:97.5 °F (36.4 °C), Max:98.1 °F (36.7 °C)    Temp:  [97.5 °F (36.4 °C)-98.1 °F (36.7 °C)] 97.7 °F (36.5 °C)  HR:  [] 55  Resp:  [16-18] 18  BP: (110-186)/(58-78) 126/58  SpO2:  [90 %-95 %] 93 %  Body mass index is 40.49 kg/m². Input and Output Summary (last 24 hours): Intake/Output Summary (Last 24 hours) at 2023 1753  Last data filed at 2023 1710  Gross per 24 hour   Intake 240 ml   Output 850 ml   Net -610 ml       Physical Exam:   Physical Exam  Vitals reviewed. Constitutional:       General: He is not in acute distress. Appearance: He is obese. He is not toxic-appearing. HENT:      Head: Normocephalic. Mouth/Throat:      Mouth: Mucous membranes are moist.   Cardiovascular:      Rate and Rhythm: Normal rate and regular rhythm. Heart sounds: No murmur heard. Pulmonary:      Effort: No respiratory distress. Breath sounds: No stridor. No wheezing.       Comments: Saturating well on room air  Abdominal:      General: Bowel sounds are normal. There is no distension. Palpations: Abdomen is soft. There is no mass. Tenderness: There is no abdominal tenderness. Genitourinary:     Comments: Noland with clear yellow urine output  Musculoskeletal:      Right lower leg: No edema. Left lower leg: No edema. Skin:     General: Skin is warm and dry. Neurological:      Mental Status: He is alert and oriented to person, place, and time. Cranial Nerves: Dysarthria and facial asymmetry present. Motor: Weakness (right hand, improving) present.    Psychiatric:         Mood and Affect: Mood normal.         Behavior: Behavior normal.          Additional Data:     Labs:  Results from last 7 days   Lab Units 12/12/23  0602   WBC Thousand/uL 11.78*   HEMOGLOBIN g/dL 13.4   HEMATOCRIT % 43.3   PLATELETS Thousands/uL 240   NEUTROS PCT % 64   LYMPHS PCT % 26   MONOS PCT % 9   EOS PCT % 1     Results from last 7 days   Lab Units 12/12/23  0602   SODIUM mmol/L 139   POTASSIUM mmol/L 4.0   CHLORIDE mmol/L 105   CO2 mmol/L 28   BUN mg/dL 19   CREATININE mg/dL 1.13   ANION GAP mmol/L 6   CALCIUM mg/dL 8.7   ALBUMIN g/dL 3.9   TOTAL BILIRUBIN mg/dL 0.87   ALK PHOS U/L 57   ALT U/L 26   AST U/L 19   GLUCOSE RANDOM mg/dL 106     Results from last 7 days   Lab Units 12/06/23  1756   INR  0.98     Results from last 7 days   Lab Units 12/12/23  1611 12/12/23  1059 12/12/23  0547 12/11/23  2029 12/11/23  1609 12/11/23  1149 12/11/23  0806 12/10/23  2117 12/10/23  1604 12/10/23  1115 12/10/23  0727 12/09/23  2115   POC GLUCOSE mg/dl 99 125 105 125 115 120 97 124 108 161* 99 114     Results from last 7 days   Lab Units 12/07/23  0438   HEMOGLOBIN A1C % 6.3*     Results from last 7 days   Lab Units 12/12/23  0602 12/11/23  0516 12/10/23  0452 12/06/23  1756   LACTIC ACID mmol/L 0.7  --   --  1.1   PROCALCITONIN ng/ml  --  0.06 0.08 <0.05       Lines/Drains:  Invasive Devices       Peripheral Intravenous Line  Duration             Peripheral IV 12/12/23 Dorsal (posterior); Left Hand <1 day    Peripheral IV 12/12/23 Dorsal (posterior); Right Hand <1 day              Drain  Duration             Urethral Catheter 16 Fr. 4 days                  Urinary Catheter:  Goal for removal: N/A- Discharging with Noland               Imaging: Reviewed radiology reports from this admission including: CT head    Recent Cultures (last 7 days):   Results from last 7 days   Lab Units 12/10/23  1216 12/08/23  0601 12/06/23  1758 12/06/23  1756   BLOOD CULTURE  No Growth at 24 hrs. No Growth at 24 hrs. No Growth After 4 Days. Bacillus species NOT anthracis* No Growth After 5 Days.  Streptococcus mitis oralis group*   GRAM STAIN RESULT   --  Gram positive rods*  --  Gram positive cocci in pairs and chains*       Last 24 Hours Medication List:   Current Facility-Administered Medications   Medication Dose Route Frequency Provider Last Rate    albuterol  2 puff Inhalation Q6H PRN Tarry Leonid, PA-C      amLODIPine  10 mg Oral Daily Tarry Leonid, PA-C      bisacodyl  10 mg Rectal PRN Tarry Leonid, PA-C      heparin (porcine)  5,000 Units Subcutaneous Q12H [address] Manuel Kenji Laly, PA-C      hydrALAZINE  10 mg Intravenous Q4H PRN Tarry Leonid, PA-C      insulin lispro  1-6 Units Subcutaneous 4x Daily (AC & HS) Tarry Leonid, PA-C      levothyroxine  200 mcg Oral Early Morning Tarry Leonid, PA-C      lidocaine  2 patch Topical Q24H Tarry Leonid, PA-C      losartan  100 mg Oral Daily Curvin Patron Sandeep PA-C      metoprolol tartrate  50 mg Oral Q12H [address] Manuel Kenji Laly, PA-C      ondansetron  4 mg Intravenous Q6H PRN Tarry Leonid, PA-C      pantoprazole  40 mg Intravenous QAM Tarry Leonid, PA-C      pneumococcal 20-concha conj vacc  0.5 mL Intramuscular Once PRN Tarry Leonid, PA-C          Today, Patient Was Seen By: Bel Avila PA-C    **Please Note: This note may have been constructed using a voice recognition system. **

## 2023-12-12 NOTE — CASE MANAGEMENT
Case Management Discharge Planning Note    Patient name Jocelynn Moralez  Location /-17 MRN [id number]  : 1940 Date 2023       Current Admission Date: 2023  Current Admission Diagnosis:Intraparenchymal hemorrhage of brain [facility])   Patient Active Problem List    Diagnosis Date Noted    Acute metabolic encephalopathy     Respiratory insufficiency 2023    Positive blood culture 12/10/2023    Dysphagia 2023    Tachypnea 2023    Diabetes mellitus ([address]) 2023    Hypertension 2023    Intraparenchymal hemorrhage of brain ([address]) 2023    Class 3 severe obesity with body mass index (BMI) of 40.0 to 44.9 in adult [facility]) 2023    Stage 3a chronic kidney disease ([address]) 2023    Benign prostatic hyperplasia without urinary obstruction 2020      LOS (days): 6  Geometric Mean LOS (GMLOS) (days): 4.60  Days to GMLOS:-1.3     OBJECTIVE:  Risk of Unplanned Readmission Score: 12.79         Current admission status: Inpatient   Preferred Pharmacy:   [address] [place] [address]   [address] [place] [id number]  Phone: [phone] Fax: [phone]    Primary Care Provider: Ally Rodas MD    Primary Insurance: [facility] [employer]  Secondary Insurance:     DISCHARGE DETAILS:           CM initiating prior authorization through pts insurance

## 2023-12-12 NOTE — QUICK NOTE
Re-evaluated patient [facility], on ICU team's behalf. Patient has no new or worsening focal deficits, his mental status is improved though he is unsure why his mentation changed earlier to begin with. BP still elevated >480 systolic, another hydralazine 10mg ordered and nursing staff made aware.  We do not believe the patient would benefit from further ICU level care at this time.  -Irena Haddad MD

## 2023-12-12 NOTE — ASSESSMENT & PLAN NOTE
Patient with positive blood culture 1/2 on 12/6 and 1/2 repeated on 12/8. BC from 12/6 grew: Streptococcus mitis oralis susceptible to rocephin  BC from 12/8 currently growing gram positive rods, blood culture identification panel unable to detect organism  Will repeat blood cultures and reach out to ID regarding BC results. Patient currently not on antibiotics, does have mild leukocytosis. Procal normal  Leukocytosis resolved  Discussed with ID: repeat BC and monitor those. Recommending echo at this time. Hold on abx. Echo: : Left ventricular cavity size is normal. Wall thickness is normal. The left ventricular ejection fraction is 65%.  Systolic function is normal. Wall motion is normal. Diastolic function is normal. No stenosis and no vegetations noted  Repeat BC on 12/10 no growth at 24 hours

## 2023-12-12 NOTE — RESPIRATORY THERAPY NOTE
RT Ventilator Management Note  Taqueria Perez 78 y.o. male MRN: [id number]  Unit/Bed#: -01 Encounter: [id number]      Daily Screen    No data found in the last 10 encounters.            Physical Exam:   Cough: Congested, Productive      Resp Comments: (P) Pt stable on RA, does not want to wear CPAP, RN's in room and aware

## 2023-12-12 NOTE — RAPID RESPONSE
Rapid Response Note  Yue Mak 78 y.o. male MRN: [id number]  Unit/Bed#: -01 Encounter: [id number]    Rapid Response Notification(s):   Response called date/time:  12/12/2023 5:45 AM  Response team arrival date/time:  12/12/2023 5:46 AM  Response end date/time:  12/12/2023 6:05 AM  [facility]  Rapid response location:  [facility] unit  Primary reason for rapid response call:  Acute change in neuro status    Rapid Response Intervention(s):   Airway:  None  Breathing:  None  Circulation:  None  Fluids administered:  None  Medications administered:  None  Comments:  Labs to include VBG, repeat CT head complete. CT head without acute changes. ICH appears stable. Official read pending. Assessment:   Altered mental status. CT head shows stable ICH. Notified by hospitalist that patient did not wear CPAP overnight. VBG pending. CBC and CMP do not show derangements supporting etiology of AMS. Plan:   Critical Care to re-evaluate mental status and make decision on transfer once VBG is received and official read of CT head. Rapid Response Outcome:   Transfer:  Other (comment) (Transfer pending re-evaluation after labs and CT head results received.)  Primary service notified of transfer: Yes    Code Status: Level 1 (Full Code)      Family notified of transfer: NA  Family member contacted: NA     Background/Situation:   Yue Mak is a 78 y.o. male who was noted to be out to the couch around 0230. This morning, nursing noticed that he was not arousing and could not answer A&O questions prompting a rapid response. Initially, patient was not easily aroused and could not respond to questions. Moments later, during the rapid response, he was able to answer where he was but did not get the year correct.       Review of Systems    Objective:   Vitals:    12/12/23 0557 12/12/23 0558 12/12/23 0558 12/12/23 0600   BP:  166/75 166/73    Pulse: 69 68 74 74   Resp:  16     Temp:       TempSrc: SpO2: 94% 94%  94%   Weight:       Height:         Physical Exam  Constitutional:       General: He is not in acute distress. HENT:      Head: Normocephalic and atraumatic. Eyes:      General:         Right eye: No discharge. Left eye: No discharge. Conjunctiva/sclera: Conjunctivae normal.      Pupils: Pupils are equal, round, and reactive to light. Cardiovascular:      Rate and Rhythm: Normal rate and regular rhythm. Pulses: Normal pulses. Pulmonary:      Effort: Pulmonary effort is normal.      Breath sounds: Normal breath sounds. Abdominal:      General: There is no distension. Palpations: Abdomen is soft. There is no mass. Tenderness: There is no abdominal tenderness. Skin:     General: Skin is warm and dry. Neurological:      Mental Status: He is lethargic and disoriented. Portions of the record may have been created with voice recognition software. Occasional wrong word or "sound a like" substitutions may have occurred due to the inherent limitations of voice recognition software. Read the chart carefully and recognize, using context, where substitutions have occurred.     Miranda Jack PA-C

## 2023-12-12 NOTE — ASSESSMENT & PLAN NOTE
Lab Results   Component Value Date    HGBA1C 6.3 (H) 12/07/2023       Recent Labs     12/11/23 2029 12/12/23  0547 12/12/23  1059 12/12/23  1611   POCGLU 125 105 125 99         Blood Sugar Average: Last 72 hrs:  (P) [id number]    Insulin sliding scale algorithm 3  Holding home Metformin  Monitor BG

## 2023-12-13 LAB
ANION GAP SERPL CALCULATED.3IONS-SCNC: 6 MMOL/L
BACTERIA BLD CULT: NORMAL
BUN SERPL-MCNC: 20 MG/DL (ref 5–25)
CALCIUM SERPL-MCNC: 8.7 MG/DL (ref 8.4–10.2)
CHLORIDE SERPL-SCNC: 108 MMOL/L (ref 96–108)
CO2 SERPL-SCNC: 24 MMOL/L (ref 21–32)
CREAT SERPL-MCNC: 1.06 MG/DL (ref 0.6–1.3)
ERYTHROCYTE [DISTWIDTH] IN BLOOD BY AUTOMATED COUNT: 15.1 % (ref 11.6–15.1)
GFR SERPL CREATININE-BSD FRML MDRD: 63 ML/MIN/1.73SQ M
GLUCOSE SERPL-MCNC: 111 MG/DL (ref 65–140)
GLUCOSE SERPL-MCNC: 114 MG/DL (ref 65–140)
GLUCOSE SERPL-MCNC: 114 MG/DL (ref 65–140)
GLUCOSE SERPL-MCNC: 211 MG/DL (ref 65–140)
GLUCOSE SERPL-MCNC: 99 MG/DL (ref 65–140)
HCT VFR BLD AUTO: 47.1 % (ref 36.5–49.3)
HGB BLD-MCNC: 13.7 G/DL (ref 12–17)
MAGNESIUM SERPL-MCNC: 2 MG/DL (ref 1.9–2.7)
MCH RBC QN AUTO: 26.9 PG (ref 26.8–34.3)
MCHC RBC AUTO-ENTMCNC: 29.1 G/DL (ref 31.4–37.4)
MCV RBC AUTO: 93 FL (ref 82–98)
PLATELET # BLD AUTO: 201 THOUSANDS/UL (ref 149–390)
PMV BLD AUTO: 10.2 FL (ref 8.9–12.7)
POTASSIUM SERPL-SCNC: 4 MMOL/L (ref 3.5–5.3)
PROCALCITONIN SERPL-MCNC: 0.05 NG/ML
RBC # BLD AUTO: 5.09 MILLION/UL (ref 3.88–5.62)
SODIUM SERPL-SCNC: 138 MMOL/L (ref 135–147)
WBC # BLD AUTO: 9.18 THOUSAND/UL (ref 4.31–10.16)

## 2023-12-13 PROCEDURE — 85027 COMPLETE CBC AUTOMATED: CPT

## 2023-12-13 PROCEDURE — 97116 GAIT TRAINING THERAPY: CPT

## 2023-12-13 PROCEDURE — C9113 INJ PANTOPRAZOLE SODIUM, VIA: HCPCS | Performed by: PHYSICIAN ASSISTANT

## 2023-12-13 PROCEDURE — 80048 BASIC METABOLIC PNL TOTAL CA: CPT

## 2023-12-13 PROCEDURE — 97530 THERAPEUTIC ACTIVITIES: CPT

## 2023-12-13 PROCEDURE — 94668 MNPJ CHEST WALL SBSQ: CPT

## 2023-12-13 PROCEDURE — 82948 REAGENT STRIP/BLOOD GLUCOSE: CPT

## 2023-12-13 PROCEDURE — 92526 ORAL FUNCTION THERAPY: CPT

## 2023-12-13 PROCEDURE — 99232 SBSQ HOSP IP/OBS MODERATE 35: CPT

## 2023-12-13 PROCEDURE — 84145 PROCALCITONIN (PCT): CPT

## 2023-12-13 PROCEDURE — 97535 SELF CARE MNGMENT TRAINING: CPT

## 2023-12-13 PROCEDURE — 94660 CPAP INITIATION&MGMT: CPT

## 2023-12-13 PROCEDURE — 92507 TX SP LANG VOICE COMM INDIV: CPT

## 2023-12-13 PROCEDURE — 83735 ASSAY OF MAGNESIUM: CPT

## 2023-12-13 PROCEDURE — 94760 N-INVAS EAR/PLS OXIMETRY 1: CPT

## 2023-12-13 RX ORDER — HYDRALAZINE HYDROCHLORIDE 20 MG/ML
10 INJECTION INTRAMUSCULAR; INTRAVENOUS EVERY 4 HOURS PRN
Status: DISCONTINUED | OUTPATIENT
Start: 2023-12-13 | End: 2023-12-14 | Stop reason: HOSPADM

## 2023-12-13 RX ORDER — HYDRALAZINE HYDROCHLORIDE 25 MG/1
25 TABLET, FILM COATED ORAL EVERY 8 HOURS SCHEDULED
Status: DISCONTINUED | OUTPATIENT
Start: 2023-12-13 | End: 2023-12-13

## 2023-12-13 RX ORDER — HYDRALAZINE HYDROCHLORIDE 25 MG/1
50 TABLET, FILM COATED ORAL EVERY 8 HOURS SCHEDULED
Status: DISCONTINUED | OUTPATIENT
Start: 2023-12-13 | End: 2023-12-14 | Stop reason: HOSPADM

## 2023-12-13 RX ADMIN — INSULIN LISPRO 2 UNITS: 100 INJECTION, SOLUTION INTRAVENOUS; SUBCUTANEOUS at 12:00

## 2023-12-13 RX ADMIN — HYDRALAZINE HYDROCHLORIDE 25 MG: 25 TABLET ORAL at 09:13

## 2023-12-13 RX ADMIN — PANTOPRAZOLE SODIUM 40 MG: 40 INJECTION, POWDER, FOR SOLUTION INTRAVENOUS at 04:57

## 2023-12-13 RX ADMIN — METOPROLOL TARTRATE 50 MG: 50 TABLET, FILM COATED ORAL at 09:13

## 2023-12-13 RX ADMIN — LOSARTAN POTASSIUM 100 MG: 50 TABLET, FILM COATED ORAL at 09:13

## 2023-12-13 RX ADMIN — HYDRALAZINE HYDROCHLORIDE 50 MG: 25 TABLET ORAL at 20:52

## 2023-12-13 RX ADMIN — LIDOCAINE 5% 2 PATCH: 700 PATCH TOPICAL at 18:13

## 2023-12-13 RX ADMIN — HEPARIN SODIUM 5000 UNITS: 5000 INJECTION INTRAVENOUS; SUBCUTANEOUS at 20:48

## 2023-12-13 RX ADMIN — METOPROLOL TARTRATE 50 MG: 50 TABLET, FILM COATED ORAL at 20:48

## 2023-12-13 RX ADMIN — HEPARIN SODIUM 5000 UNITS: 5000 INJECTION INTRAVENOUS; SUBCUTANEOUS at 09:13

## 2023-12-13 RX ADMIN — LEVOTHYROXINE SODIUM 200 MCG: 100 TABLET ORAL at 04:57

## 2023-12-13 RX ADMIN — HYDRALAZINE HYDROCHLORIDE 50 MG: 25 TABLET ORAL at 13:49

## 2023-12-13 RX ADMIN — AMLODIPINE BESYLATE 10 MG: 10 TABLET ORAL at 09:13

## 2023-12-13 NOTE — ASSESSMENT & PLAN NOTE
Lab Results   Component Value Date    EGFR 63 12/13/2023    EGFR 58 12/12/2023    EGFR 58 12/12/2023    CREATININE 1.06 12/13/2023    CREATININE 1.13 12/12/2023    CREATININE 1.13 12/12/2023     His baseline creatinine is 1.4-1.5 at baseline  Avoid nephrotoxic medications, nsaids, hypotension  Monitor I&Os

## 2023-12-13 NOTE — PHYSICAL THERAPY NOTE
PHYSICAL THERAPY TREATMENT NOTE    NAME:  Sandra Castellon  DATE: 12/13/23    Length Of Stay: 7  Performed at least 2 patient identifiers during session: Name and Birthday    TREATMENT FLOW SHEET:       12/13/23 1347   PT Last Visit   PT Visit Date 12/13/23   Note Type   Note Type Treatment   Pain Assessment   Pain Assessment Tool 0-10   Pain Score No Pain   Restrictions/Precautions   Weight Bearing Precautions Per Order No   Other Precautions Chair Alarm; Bed Alarm; Fall Risk  (carbajal catheter)   General   Chart Reviewed Yes   Response to Previous Treatment Patient with no complaints from previous session. Family/Caregiver Present No   Cognition   Arousal/Participation Cooperative   Attention Within functional limits   Transfers   Sit to Stand   (steadying assist)   Additional items Assist x 1; Increased time required;Verbal cues   Stand to Sit   (steadying assist)   Stand pivot   (steadying assist)   Additional items Assist x 1; Increased time required;Verbal cues   Additional Comments Using RW   Ambulation/Elevation   Gait pattern Improper Weight shift; Wide LUPE; Inconsistent rosalia;Decreased hip extension;Decreased heel strike;Decreased toe off   Gait Assistance   (steadying assist)   Additional items Assist x 1;Verbal cues   Assistive Device Rolling walker   Distance 55 ft x 1; 11 ft x 2   Stair Management Assistance 4  Minimal assist   Additional items Assist x 1;Verbal cues   Stair Management Technique Alternating pattern; Two rails   Number of Stairs 4  (performed twice with seated rest in between trials)   Ambulation/Elevation Additional Comments Cues to remain within/close to RW   Balance   Static Sitting Good   Dynamic Sitting Fair +   Static Standing Fair -   Dynamic Standing Poor +   Ambulatory Poor +   Endurance Deficit   Endurance Deficit Yes   Activity Tolerance   Activity Tolerance Patient limited by fatigue   Medical Staff Made Aware OT Zulema   Assessment   Prognosis Good   Assessment Pt tolerated tx session fairly well. Interventions consisting of gait & transfer training with RW as well as stair navigation. Pt eager to participate. Pt's systolic BP closely monitored & below 160 throughout. Pt reports no dizziness, pain, or lightheadedness. Expressive aphasia noted. Still appropriate for level I resource intensity. Barriers to Discharge Inaccessible home environment;Decreased caregiver support   Barriers to Discharge Comments fall risk; current assist level   Goals   STG Expiration Date 12/21/23   PT Treatment Day 3   Plan   Progress Progressing toward goals   PT Frequency 4-6x/wk   Discharge Recommendation   Rehab Resource Intensity Level, PT I (Maximum Resource Intensity)   AM-PAC Basic Mobility Inpatient   Turning in Flat Bed Without Bedrails 3   Lying on Back to Sitting on Edge of Flat Bed Without Bedrails 2   Moving Bed to Chair 3   Standing Up From Chair Using Arms 3   Walk in Room 3   Climb 3-5 Stairs With Railing 3   Basic Mobility Inpatient Raw Score 17   Basic Mobility Standardized Score 39.67   Highest Level Of Mobility   JH-HLM Goal 5: Stand one or more mins   JH-HLM Achieved 7: Walk 25 feet or more   Education   Education Provided Mobility training;Assistive device   Patient Reinforcement needed   End of Consult   Patient Position at End of Consult Bedside chair;Bed/Chair alarm activated; All needs within reach   End of Consult Comments /68 post activity       The patient's AM-PAC Basic Mobility Inpatient Short Form Raw Score is 17. A Raw score of greater than 16 suggests the patient may benefit from discharge to home. Please also refer to the recommendation of the Physical Therapist for safe discharge planning.        Yvette Newby, PT,DPT telemetry reviewed - had bradycardia at night (3-4 am) suspect sleep apnea.  HR in 70-80 range now, while awake - mental status unchanged  No indication for pacemaker.  Consider w/u for ELEN as outpatient.  Avoid AVN blockers.  F/u echo.  If no new events, d/c telemetry.

## 2023-12-13 NOTE — ASSESSMENT & PLAN NOTE
Weaned off Cardene 12/9/23  Continue BB, Losartan  Home Norvasc increased to 10mg  May need to increase losartan to keep BP within goal.   Increased losartan to 100 mg qd as patient's BP was increasing back to 160s and above. Add hydralazine 50 mg tid to regimen as BP still elevated.    Goal SBP is normotension at this time  Hydralazine PRN

## 2023-12-13 NOTE — ASSESSMENT & PLAN NOTE
Lab Results   Component Value Date    HGBA1C 6.3 (H) 12/07/2023       Recent Labs     12/12/23  1611 12/12/23 2052 12/13/23  0746 12/13/23  1101   POCGLU 99 110 111 211*         Blood Sugar Average: Last 72 hrs:  (P) 122.4364480602349336    Insulin sliding scale algorithm 3  Holding home Metformin  Monitor BG SURGICAL ONCOLOGY PROGRESS NOTE        Vital Signs Last 24 Hrs  T(C): 36.4 (19 Dec 2017 05:41), Max: 38.3 (18 Dec 2017 08:10)  T(F): 97.6 (19 Dec 2017 05:41), Max: 100.9 (18 Dec 2017 08:10)  HR: 99 (19 Dec 2017 05:41) (96 - 127)  BP: 105/65 (19 Dec 2017 05:41) (105/65 - 130/80)  BP(mean): --  RR: 16 (19 Dec 2017 05:41) (16 - 17)  SpO2: 100% (19 Dec 2017 05:41) (99% - 100%)  I&O's Detail    18 Dec 2017 07:01  -  19 Dec 2017 07:00  --------------------------------------------------------  IN:  Total IN: 0 mL    OUT:    Bulb: 70 mL    Drain: 1025 mL    Voided: 200 mL  Total OUT: 1295 mL    Total NET: -1295 mL          PE:    A&A  NAD    soft, NT, ND, No peritoneal signs    inc  cdi.  ost w stool    IR drain w purlulent fluid    Nephrostomy w clear urine                          7.8    18.99 )-----------( 430      ( 18 Dec 2017 06:19 )             26.8

## 2023-12-13 NOTE — PLAN OF CARE
Problem: Potential for Falls  Goal: Patient will remain free of falls  Description: INTERVENTIONS:  - Educate patient/family on patient safety including physical limitations  - Instruct patient to call for assistance with activity   - Consult OT/PT to assist with strengthening/mobility   - Keep Call bell within reach  - Keep bed low and locked with side rails adjusted as appropriate  - Keep care items and personal belongings within reach  - Initiate and maintain comfort rounds  - Make Fall Risk Sign visible to staff  - Offer Toileting every 2 Hours, in advance of need if unable to reposition self  - Initiate/Maintain bed/chair alarm  - Apply yellow socks and bracelet for high fall risk patients  - Consider moving patient to room near nurses station  Outcome: Progressing A-fib    Acid reflux    Asthmatic bronchitis    Coronary artery disease involving native heart without angina pectoris, unspecified vessel or lesion type    DM (diabetes mellitus)    HTN (hypertension)    Hypercholesteremia    Lung disease    Sleep apnea    Spinal stenosis, unspecified spinal region

## 2023-12-13 NOTE — RESPIRATORY THERAPY NOTE
Resp Care       12/13/23 5560   Respiratory Assessment   Assessment Type Assess only   General Appearance Alert; Awake   Respiratory Pattern Normal   Chest Assessment Chest expansion symmetrical   Bilateral Breath Sounds Clear   Cough Productive   Resp Comments Pt remains on RA cont with flutter no resp distress at this time.    O2 Device RA

## 2023-12-13 NOTE — ASSESSMENT & PLAN NOTE
Patient was noted to be requiring around 3L NC. Is not on oxygen at baseline. CXR: no acute cardiopulmonary disease  No evidence of aspiration at this time  Procal negative and leukocytosis normalized today. Was weaned to room air today and remains on room air. Keep o2 >89%.  Incentive spirometry

## 2023-12-13 NOTE — PLAN OF CARE
Problem: PHYSICAL THERAPY ADULT  Goal: Performs mobility at highest level of function for planned discharge setting. See evaluation for individualized goals. Description: Treatment/Interventions: ADL retraining, Functional transfer training, LE strengthening/ROM, Elevations, Therapeutic exercise, Endurance training, Patient/family training, Gait training, Bed mobility, Equipment eval/education, Compensatory technique education, Spoke to nursing, Spoke to case management, OT          See flowsheet documentation for full assessment, interventions and recommendations. Outcome: Progressing  Note: Prognosis: Good  Problem List: Decreased strength, Decreased endurance, Impaired balance, Decreased coordination, Decreased mobility, Impaired judgement, Decreased safety awareness, Obesity  Assessment: Pt tolerated tx session fairly well. Interventions consisting of gait & transfer training with RW as well as stair navigation. Pt eager to participate. Pt's systolic BP closely monitored & below 160 throughout. Pt reports no dizziness, pain, or lightheadedness. Expressive aphasia noted. Still appropriate for level I resource intensity. Barriers to Discharge: Inaccessible home environment, Decreased caregiver support  Barriers to Discharge Comments: fall risk; current assist level  Rehab Resource Intensity Level, PT: I (Maximum Resource Intensity)    See flowsheet documentation for full assessment.

## 2023-12-13 NOTE — ASSESSMENT & PLAN NOTE
Patient with positive blood culture 1/2 on 12/6 and 1/2 repeated on 12/8. BC from 12/6 grew: Streptococcus mitis oralis susceptible to rocephin  BC from 12/8 currently growing gram positive rods, blood culture identification panel unable to detect organism  Will repeat blood cultures and reach out to ID regarding BC results. Patient currently not on antibiotics, does have mild leukocytosis. Procal normal  Leukocytosis resolved  Discussed with ID: repeat BC and monitor those. Recommending echo at this time. Hold on abx. Echo: : Left ventricular cavity size is normal. Wall thickness is normal. The left ventricular ejection fraction is 65%.  Systolic function is normal. Wall motion is normal. Diastolic function is normal. No stenosis and no vegetations noted  Repeat BC on 12/10 no growth at 48 hours

## 2023-12-13 NOTE — PLAN OF CARE
Problem: OCCUPATIONAL THERAPY ADULT  Goal: Performs self-care activities at highest level of function for planned discharge setting. See evaluation for individualized goals. Description: Treatment Interventions: ADL retraining, Functional transfer training, Endurance training, Patient/family training, Equipment evaluation/education, Neuromuscular reeducation, Fine motor coordination activities, Compensatory technique education, Continued evaluation, Energy conservation, Activityengagement          See flowsheet documentation for full assessment, interventions and recommendations. Outcome: Progressing  Note: Limitation: Decreased ADL status, Decreased Safe judgement during ADL, Decreased endurance, Decreased fine motor control, Decreased self-care trans, Decreased high-level ADLs     Assessment: Pt completed OT tx session #3 focused on ADL performance and functional mobility. Pt alert and agreeable to participate. Pt demonstrated improvements in standing tolerance/balance and ADL performance this session but continues to be limited by fatigue and hypertension. Pt currently completing UB ADLs @ S/set-up, LB ADLs @ Mod A, and functional mobility with RW @ Steadying assist. Pt demonstrating Good participation and Good potential to achieve goals but is currently demonstrating deficits in decrease activity tolerance, decrease standing balance, decrease performance during ADL tasks, decrease safety awareness , decrease BUE ROM, decrease UB MS, decrease generalized strength, decrease activity engagement, and decrease performance during functional transfers. Continue to recommend Level I: Maximum Resource Intensity Therapy upon discharge to increase safety and independence in ADL tasks and functional mobility.      Rehab Resource Intensity Level, OT: I (Maximum Resource Intensity)

## 2023-12-13 NOTE — PROGRESS NOTES
[facility]  Progress Note  Name: Maria Alejandra Simmons  MRN: [id number]  Unit/Bed#: -01 I Date of Admission: 12/6/2023   Date of Service: 12/13/2023 I Hospital Day: 7    Assessment/Plan   * Intraparenchymal hemorrhage of brain [facility])  Assessment & Plan  Patient is a 78-year-old male with medical history pertinent for hypertension diabetes GERD and BPH presenting with dizziness, increasing weakness and garbled speech. Last seen well 12/5/23 11AM, refused to go to hospital until he fell 12/6/23 w/o injury. Recently tx w/azithro outpt for bronchitis. ER stroke alert called-pressure was 191/89 with aspirin yesterday NIH score of 2 and her CT revealed a small acute left basal ganglia and lateral midbrain IPH mild vasogenic edema. Repeat CT head 1 & 6h without interval IPH changes. No LVO spot signs, no vascular malformations  Repeat CT of head prn for new or worsening neuro deficits  BP goal 120-160  Cardene drip stopped 12/9  Currently on lopressor BID, Norvasc, Losartan, and PRN hydralazine 10mg IV  Elevate HOB 30+ degrees  Neurology is following  MRI 12/7: Stable left thalamic hemorrhage and surrounding vasogenic edema. Foci of chronic microhemorrhage in the left thalamus and left temporal subcortical white matter. 2. No evidence of solid lesion underlying the left thalamic hemorrhage. Of note, NO hemorrhage shock   Consider outpatient MRI in 8 to 12 weeks  Started DVT prophylaxis 12/9/23  PT/OT consulted - recommending rehab  Discussed with neurology on 12/11: continue with current treatment plan - keep BP <160 and go to rehab as PT/OT recommends. Was RRT in AM of 12/12 for confusion, repeat CT head: Stable left thalamic hematoma extending into the cerebral peduncle, BP was elevated, labs unremarkable. Critical care ordered hydralazine IV   Increased BP, patient's mental status returned to baseline shortly after. Discussed with neuro, recommending normotension for BP.      Positive blood culture  Assessment & Plan  Patient with positive blood culture 1/2 on 12/6 and 1/2 repeated on 12/8. BC from 12/6 grew: Streptococcus mitis oralis susceptible to rocephin  BC from 12/8 currently growing gram positive rods, blood culture identification panel unable to detect organism  Will repeat blood cultures and reach out to ID regarding BC results. Patient currently not on antibiotics, does have mild leukocytosis. Procal normal  Leukocytosis resolved  Discussed with ID: repeat BC and monitor those. Recommending echo at this time. Hold on abx. Echo: : Left ventricular cavity size is normal. Wall thickness is normal. The left ventricular ejection fraction is 65%. Systolic function is normal. Wall motion is normal. Diastolic function is normal. No stenosis and no vegetations noted  Repeat BC on 12/10 no growth at 48 hours    Dysphagia  Assessment & Plan  SLP following  S/p VBS 12/8- Cleared for pureed diet with HTL  Continue to monitor for risk of aspiration     Tachypnea  Assessment & Plan  Tachypnea into 20's-30's on arrival, sats >92% throughout hospital stay, RA->3L->2L currently. May be secondary to acute stress and body habitus. Continue and wean as tolerated, sat goal >94% to ensure optimal brain oxygenation  Hx BASILIA, CPAP qhs    Stage 3a chronic kidney disease [facility])  Assessment & Plan  Lab Results   Component Value Date    EGFR 63 12/13/2023    EGFR 58 12/12/2023    EGFR 58 12/12/2023    CREATININE 1.06 12/13/2023    CREATININE 1.13 12/12/2023    CREATININE 1.13 12/12/2023     His baseline creatinine is 1.4-1.5 at baseline  Avoid nephrotoxic medications, nsaids, hypotension  Monitor I&Os    Benign prostatic hyperplasia without urinary obstruction  Assessment & Plan  Continue Flomax 0.4 mg p.o.    With carbajal in place, will discuss with urology when patient should have a voiding trial.   Discussed with urology - as patient is not ambulating much, hold on voiding trial, can follow up outpatient for voiding trial or do voiding trial at rehab    Class 3 severe obesity with body mass index (BMI) of 40.0 to 44.9 in adult [facility])  Assessment & Plan  Dietary and lifestyle counseling recommended prior to discharge     Hypertension  Assessment & Plan  Weaned off Cardene 12/9/23  Continue BB, Losartan  Home Norvasc increased to 10mg  May need to increase losartan to keep BP within goal.   Increased losartan to 100 mg qd as patient's BP was increasing back to 160s and above. Add hydralazine 50 mg tid to regimen as BP still elevated. Goal SBP is normotension at this time  Hydralazine PRN     Diabetes mellitus [facility])  Assessment & Plan  Lab Results   Component Value Date    HGBA1C 6.3 (H) 12/07/2023       Recent Labs     12/12/23  1611 12/12/23  2052 12/13/23  0746 12/13/23  1101   POCGLU 99 110 111 211*         Blood Sugar Average: Last 72 hrs:  (P) 122.7601445090704897    Insulin sliding scale algorithm 3  Holding home Metformin  Monitor BG     Respiratory insufficiency-resolved as of 12/12/2023  Assessment & Plan  Patient was noted to be requiring around 3L NC. Is not on oxygen at baseline. CXR: no acute cardiopulmonary disease  No evidence of aspiration at this time  Procal negative and leukocytosis normalized today. Was weaned to room air today and remains on room air. Keep o2 >89%. Incentive spirometry          VTE Pharmacologic Prophylaxis:   High Risk (Score >/= 5) - Pharmacological DVT Prophylaxis Ordered: heparin. Sequential Compression Devices Ordered. Mobility:   Basic Mobility Inpatient Raw Score: 11  -HLM Goal: 4: Move to chair/commode  -HLM Achieved: 2: Bed activities/Dependent transfer  HLM Goal NOT achieved. Continue with multidisciplinary rounding and encourage appropriate mobility to improve upon [facility] goals. Patient Centered Rounds: I performed bedside rounds with nursing staff today.    Discussions with Specialists or Other Care Team Provider: nursing, case management    Education and Discussions with Family / Patient: Updated  (son) via phone. Total Time Spent on Date of Encounter in care of patient: 40 mins. This time was spent on one or more of the following: performing physical exam; counseling and coordination of care; obtaining or reviewing history; documenting in the medical record; reviewing/ordering tests, medications or procedures; communicating with other healthcare professionals and discussing with patient's family/caregivers. Current Length of Stay: 7 day(s)  Current Patient Status: Inpatient   Certification Statement: The patient will continue to require additional inpatient hospital stay due to BP control, pending rehab auth  Discharge Plan: Anticipate discharge in 24-48 hrs to rehab facility. Code Status: Level 1 - Full Code    Subjective:   Patient seen and examined. Doing well. Only complaint is that he would like someone to help him shave. He is hopeful to going to rehab soon. Denies any nausea, vomiting, diarrhea, constipation, abdominal pain, CP, SOB, fever, chills. Objective:     Vitals:   Temp (24hrs), Av.6 °F (36.4 °C), Min:97.5 °F (36.4 °C), Max:97.7 °F (36.5 °C)    Temp:  [97.5 °F (36.4 °C)-97.7 °F (36.5 °C)] 97.7 °F (36.5 °C)  HR:  [51-63] 60  Resp:  [18-22] 22  BP: (117-161)/(52-73) 146/62  SpO2:  [93 %-96 %] 96 %  Body mass index is 40.49 kg/m². Input and Output Summary (last 24 hours): Intake/Output Summary (Last 24 hours) at 2023 1509  Last data filed at 2023 1348  Gross per 24 hour   Intake 340 ml   Output 400 ml   Net -60 ml       Physical Exam:   Physical Exam  Vitals reviewed. Constitutional:       General: He is not in acute distress. Appearance: He is obese. He is not ill-appearing or toxic-appearing. HENT:      Head: Normocephalic and atraumatic. Mouth/Throat:      Mouth: Mucous membranes are moist.   Cardiovascular:      Rate and Rhythm: Normal rate and regular rhythm. Heart sounds:  No murmur heard. Pulmonary:      Effort: No respiratory distress. Breath sounds: No stridor. No wheezing. Comments:  [place] with clear yellow urine output  Musculoskeletal:      Right lower leg: No edema. Left lower leg: No edema. Skin:     General: Skin is warm and dry. Neurological:      Mental Status: He is alert and oriented to person, place, and time. Cranial Nerves: Dysarthria and facial asymmetry present. Motor: Weakness (right hand weakness) present.    Psychiatric:         Mood and Affect: Mood normal.         Behavior: Behavior normal.          Additional Data:     Labs:  Results from last 7 days   Lab Units 12/13/23  0608 12/12/23  0602   WBC Thousand/uL 9.18 11.78*   HEMOGLOBIN g/dL 13.7 13.4   HEMATOCRIT % 47.1 43.3   PLATELETS Thousands/uL 201 240   NEUTROS PCT %  --  64   LYMPHS PCT %  --  26   MONOS PCT %  --  9   EOS PCT %  --  1     Results from last 7 days   Lab Units 12/13/23  0608 12/12/23  0602   SODIUM mmol/L 138 139   POTASSIUM mmol/L 4.0 4.0   CHLORIDE mmol/L 108 105   CO2 mmol/L 24 28   BUN mg/dL 20 19   CREATININE mg/dL 1.06 1.13   ANION GAP mmol/L 6 6   CALCIUM mg/dL 8.7 8.7   ALBUMIN g/dL  --  3.9   TOTAL BILIRUBIN mg/dL  --  0.87   ALK PHOS U/L  --  57   ALT U/L  --  26   AST U/L  --  19   GLUCOSE RANDOM mg/dL 99 106     Results from last 7 days   Lab Units 12/06/23  1756   INR  0.98     Results from last 7 days   Lab Units 12/13/23  1101 12/13/23  0746 12/12/23  2052 12/12/23  1611 12/12/23  1059 12/12/23  0547 12/11/23  2029 12/11/23  1609 12/11/23  1149 12/11/23  0806 12/10/23  2117 12/10/23  1604   POC GLUCOSE mg/dl 211* 111 110 99 125 105 125 115 120 97 124 108     Results from last 7 days   Lab Units 12/07/23  0438   HEMOGLOBIN A1C % 6.3*     Results from last 7 days   Lab Units 12/13/23  0608 12/12/23  0602 12/11/23  0516 12/10/23  0452 12/06/23  1756   LACTIC ACID mmol/L  --  0.7  --   --  1.1   PROCALCITONIN ng/ml 0.05  --  0.06 0.08 <0.05 Lines/Drains:  Invasive Devices       Peripheral Intravenous Line  Duration             Peripheral IV 12/12/23 Dorsal (posterior); Left Hand 1 day    Peripheral IV 12/12/23 Dorsal (posterior); Right Hand 1 day              Drain  Duration             Urethral Catheter 16 Fr. 5 days                  Urinary Catheter:  Goal for removal:  Will discharge with carbajal and voiding trial at rehab. Imaging: No pertinent imaging reviewed. Recent Cultures (last 7 days):   Results from last 7 days   Lab Units 12/10/23  1216 12/08/23  0601 12/06/23  1758 12/06/23  1756   BLOOD CULTURE  No Growth at 48 hrs. No Growth at 48 hrs. No Growth After 5 Days. Bacillus species NOT anthracis* No Growth After 5 Days.  Streptococcus mitis oralis group*   GRAM STAIN RESULT   --  Gram positive rods*  --  Gram positive cocci in pairs and chains*       Last 24 Hours Medication List:   Current Facility-Administered Medications   Medication Dose Route Frequency Provider Last Rate    albuterol  2 puff Inhalation Q6H PRN [place] Rickey, PA-C      amLODIPine  10 mg Oral Daily [place] Rickey, PA-C      bisacodyl  10 mg Rectal PRN [place] Rickey, PA-C      heparin (porcine)  5,000 Units Subcutaneous Q12H [address] [facility] Laly, PA-C      hydrALAZINE  10 mg Intravenous Q4H PRN [place] Rickey, PA-C      hydrALAZINE  50 mg Oral [facility] ALESSANDRO Izquierdo-VICTORIANO      insulin lispro  1-6 Units Subcutaneous 4x Daily (AC & HS) [place] Rickey, PA-C      levothyroxine  200 mcg Oral Early Morning [place] Rickey, PA-C      lidocaine  2 patch Topical Q24H [place] Rickey, PA-C      losartan  100 mg Oral Daily Sherl Purple Sandeep, PA-C      metoprolol tartrate  50 mg Oral Q12H [address] [facility], [facility]-C      ondansetron  4 mg Intravenous Q6H PRN [place] Rickey, PA-C      pantoprazole  40 mg Intravenous QAM [place] Rickey, PA-C      pneumococcal 20-concha conj vacc  0.5 mL Intramuscular Once PRN Wyn Radha July Layton PA-C          Today, Patient Was Seen By: Watson Olea PA-C    **Please Note: This note may have been constructed using a voice recognition system. **

## 2023-12-13 NOTE — PLAN OF CARE
Problem: Potential for Falls  Goal: Patient will remain free of falls  Description: INTERVENTIONS:  - Educate patient/family on patient safety including physical limitations  - Instruct patient to call for assistance with activity   - Consult OT/PT to assist with strengthening/mobility   - Keep Call bell within reach  - Keep bed low and locked with side rails adjusted as appropriate  - Keep care items and personal belongings within reach  - Initiate and maintain comfort rounds  - Make Fall Risk Sign visible to staff  - Offer Toileting every 2 Hours, in advance of need if unable to reposition self  - Initiate/Maintain bed/chair alarm  - Apply yellow socks and bracelet for high fall risk patients  - Consider moving patient to room near nurses station  Outcome: Progressing     Problem: PAIN - ADULT  Goal: Verbalizes/displays adequate comfort level or baseline comfort level  Description: Interventions:  - Encourage patient to monitor pain and request assistance  - Assess pain using appropriate pain scale  - Administer analgesics based on type and severity of pain and evaluate response  - Implement non-pharmacological measures as appropriate and evaluate response  - Consider cultural and social influences on pain and pain management  - Notify physician/advanced practitioner if interventions unsuccessful or patient reports new pain  Outcome: Progressing     Problem: INFECTION - ADULT  Goal: Absence or prevention of progression during hospitalization  Description: INTERVENTIONS:  - Assess and monitor for signs and symptoms of infection  - Monitor lab/diagnostic results  - Monitor all insertion sites, i.e. indwelling lines, tubes, and drains  - Monitor endotracheal if appropriate and nasal secretions for changes in amount and color  - [place] appropriate cooling/warming therapies per order  - Administer medications as ordered  - Instruct and encourage patient and family to use good hand hygiene technique  - Identify and instruct in appropriate isolation precautions for identified infection/condition  Outcome: Progressing     Problem: SAFETY ADULT  Goal: Patient will remain free of falls  Description: INTERVENTIONS:  - Educate patient/family on patient safety including physical limitations  - Instruct patient to call for assistance with activity   - Consult OT/PT to assist with strengthening/mobility   - Keep Call bell within reach  - Keep bed low and locked with side rails adjusted as appropriate  - Keep care items and personal belongings within reach  - Initiate and maintain comfort rounds  - Make Fall Risk Sign visible to staff  - Offer Toileting every 2 Hours, in advance of need if unable to reposition self  - Initiate/Maintain bed/chair alarm  - Apply yellow socks and bracelet for high fall risk patients  - Consider moving patient to room near nurses station  Outcome: Progressing  Goal: Maintain or return to baseline ADL function  Description: INTERVENTIONS:  -  Assess patient's ability to carry out ADLs; assess patient's baseline for ADL function and identify physical deficits which impact ability to perform ADLs (bathing, care of mouth/teeth, toileting, grooming, dressing, etc.)  - Assess/evaluate cause of self-care deficits   - Assess range of motion  - Assess patient's mobility; develop plan if impaired  - Assess patient's need for assistive devices and provide as appropriate  - Encourage maximum independence but intervene and supervise when necessary  - Involve family in performance of ADLs  - Assess for home care needs following discharge   - Consider OT consult to assist with ADL evaluation and planning for discharge  - Provide patient education as appropriate  Outcome: Progressing  Goal: Maintains/Returns to pre admission functional level  Description: INTERVENTIONS:  - Perform AM-PAC 6 Click Basic Mobility/ Daily Activity assessment daily.  - Set and communicate daily mobility goal to care team and patient/family/caregiver. - Collaborate with rehabilitation services on mobility goals if consulted  - Perform Range of Motion 3 times a day. - Reposition patient every 2 hours if pt unable to reposition self  - Record patient progress and toleration of activity level   Outcome: Progressing     Problem: DISCHARGE PLANNING  Goal: Discharge to home or other facility with appropriate resources  Description: INTERVENTIONS:  - Identify barriers to discharge w/patient and caregiver  - Arrange for needed discharge resources and transportation as appropriate  - Identify discharge learning needs (meds, wound care, etc.)  - Arrange for interpretive services to assist at discharge as needed  - Refer to Case Management Department for coordinating discharge planning if the patient needs post-hospital services based on physician/advanced practitioner order or complex needs related to functional status, cognitive ability, or social support system  Outcome: Progressing     Problem: Knowledge Deficit  Goal: Patient/family/caregiver demonstrates understanding of disease process, treatment plan, medications, and discharge instructions  Description: Complete learning assessment and assess knowledge base.   Interventions:  - Provide teaching at level of understanding  - Provide teaching via preferred learning methods  Outcome: Progressing     Problem: NEUROSENSORY - ADULT  Goal: Achieves stable or improved neurological status  Description: INTERVENTIONS  - Monitor and report changes in neurological status  - Monitor vital signs such as temperature, blood pressure, glucose, and any other labs ordered   - Initiate measures to prevent increased intracranial pressure  - Monitor for seizure activity and implement precautions if appropriate      Outcome: Progressing  Goal: Remains free of injury related to seizures activity  Description: INTERVENTIONS  - Maintain airway, patient safety  and administer oxygen as ordered  - Monitor patient for seizure activity, document and report duration and description of seizure to physician/advanced practitioner  - If seizure occurs,  ensure patient safety during seizure  - Reorient patient post seizure  - Seizure pads on all 4 side rails  - Instruct patient/family to notify RN of any seizure activity including if an aura is experienced  - Instruct patient/family to call for assistance with activity based on nursing assessment  - Administer anti-seizure medications if ordered    Outcome: Progressing  Goal: Achieves maximal functionality and self care  Description: INTERVENTIONS  - Monitor swallowing and airway patency with patient fatigue and changes in neurological status  - Encourage and assist patient to increase activity and self care. - Encourage visually impaired, hearing impaired and aphasic patients to use assistive/communication devices  Outcome: Progressing     Problem: Prexisting or High Potential for Compromised Skin Integrity  Goal: Skin integrity is maintained or improved  Description: INTERVENTIONS:  - Identify patients at risk for skin breakdown  - Assess and monitor skin integrity  - Assess and monitor nutrition and hydration status  - Monitor labs   - Assess for incontinence   - Turn and reposition patient  - Assist with mobility/ambulation  - Relieve pressure over bony prominences  - Avoid friction and shearing  - Provide appropriate hygiene as needed including keeping skin clean and dry  - Evaluate need for skin moisturizer/barrier cream  - Collaborate with interdisciplinary team   - Patient/family teaching  - Consider wound care consult   Outcome: Progressing     Problem: Nutrition/Hydration-ADULT  Goal: Nutrient/Hydration intake appropriate for improving, restoring or maintaining nutritional needs  Description: Monitor and assess patient's nutrition/hydration status for malnutrition. Collaborate with interdisciplinary team and initiate plan and interventions as ordered.   Monitor patient's weight and dietary intake as ordered or per policy. Utilize nutrition screening tool and intervene as necessary. Determine patient's food preferences and provide high-protein, high-caloric foods as appropriate.      INTERVENTIONS:  - Monitor oral intake, urinary output, labs, and treatment plans  - Assess nutrition and hydration status and recommend course of action  - Evaluate amount of meals eaten  - Assist patient with eating if necessary   - Allow adequate time for meals  - Recommend/ encourage appropriate diets, oral nutritional supplements, and vitamin/mineral supplements  - Order, calculate, and assess calorie counts as needed  - Recommend, monitor, and adjust tube feedings and TPN/PPN based on assessed needs  - Assess need for intravenous fluids  - Provide specific nutrition/hydration education as appropriate  - Include patient/family/caregiver in decisions related to nutrition  Outcome: Progressing

## 2023-12-13 NOTE — NURSING NOTE
This nurse checked pt's BP around 2015 resulting in 149/73. PRN hydralazine admin after alerting DOMENICA Palafox around 2100 per order. BP recheck 144/63 around 2200, DOMENICA Palafox made aware. New order received to give x1 dose of labetaol with BP recheck 117/52. Pt resting comfortable in bed.

## 2023-12-13 NOTE — SPEECH THERAPY NOTE
Speech Language/Pathology    Speech/Language Pathology Progress Note    Patient Name: Lea SCHWARTZ Date: 12/13/2023      Assessment:  Pt seen for dysphagia and speech/cog  therapy. For dysphagia therapy, pt see for breakfast meal of puree and honey thick liquids. No upgrade trials completed this date. Pt noted to be poorly positioned and slightly reclined. SLP repositioned. Pt self feeding w/ adequate rate. Adequate manipulation and clearance, no oral residual noted. Intermittent wet vocal quality noted. No overt coughing. For cog/speech therapy-  Pt oriented to place, month, OLIVIER, and year. Not oriented to date and required initial cue for situation. For expressive language, automatics completed- [facility] was impaired as pt continues to skip October. To address receptive language, pt answered complex yes/no questions w/ 100% accuracy. Responsive naming was 100% accuracy. For cognition:  STM: 3/3 immediate recall, 2/3 w/ 2 min delay/distractor, 2/3 w/ 5 min delay/distractor. Reasoning: convergent was intact, abstract impaired. Problem solving was [facility] for everyday safety situations. Speech is moderately dysarthric in connected speech, suspect impacted by macroglossia and lack of dentition      Plan/Recommendations:  Continue SLP services at next level of care.  Recommend repeat VBS prior to diet upgrade    Raghu Rivera  12/13/2023

## 2023-12-13 NOTE — CASE MANAGEMENT
[address] received request for authorization from Care Manager.   Authorization request for: Acute Rehab  Facility Name: \[facility]\"" Acute Rehab  NPI: [id number]  Facility MD:  Dr Michel Swartz: [id number]  Authorization initiated by contacting insurance:  [employer]  Via: Catie Conception   Clinicals submitted via: Availity attachment   Pending Reference #: [id number]

## 2023-12-13 NOTE — CONSULTS
e-Consult (IPC)   Kristin Monsivais 78 y.o. male MRN: [id number]  Unit/Bed#: -01 Encounter: [id number]      Reason for Consult / Principal Problem: Evaluation for appropriate level of post-acute care    Inpatient consult to Physical Medicine Rehab  Consult performed by: Cristina Castillo MD  Consult ordered by: Kennedy English PA-C        12/12/23    Mr. Alban Dumont is an 77yo M with medical history of HTN, T2DM, GERD who presented to [place] on 12/6 with generalized weakness, fatigue, and garbled speech. Had just completed abx for bronchitis. /89. Cth showed small acute L BG and lateral midbrain IPH without significant associated mass effect or IVH. CTA H/N showed no LVO, no spot sign or underlying vascular malformation. No TNK due to IPH on CTH and no thrombectomy due to absence of LVO. Etiology felt to be hypertensive. All AP/AC was held. No need for ddAVP given last dose of ASA the day prior. He was recommended MRI brain and SBP <140/90. Patient was transferred out of the ICU on 12/8. HTN required a cardene gtt. VBS performed on 12/8 showed moderate oral and mod-severe pharyngeal dysphagia. He was recommended for Pureed/HTL. Course c/b urinary retention with carbajal placed on 12/8 and patient on flomax. MRI showed stable L thalamic hemorrhage and vasogenic edema with foci of chronic microhemorrhage in the L thalamus and L temporal subcortical white matter with no evidence of solid lesion underlying the L thalamic hemorrhage. HE was cleared to start DVT Ppx on 12/9. Yesterday patient with AHRF requiring 3L NC - CXR without acute cardiopulmonary disease, and procal negative with leukocytosis normalizing as of yesterday. He was able to be weaned to RA on 12/11. Blood cultures positive on 12/6 and on 12/8. Team discussed with ID who recommended repeating BC and getting an echo while holding abx. No vegetations noted on echo and his leukocytosis resolved off abx.  ON 12/12, patient was unarousable with change in mental status. CTH showed stable ICH, noted that patient did not wear CPAP overnight. VBG was ordered. CBC/CMP were unrevealing. His mental status did improve and it was noted his BP was also a bit elevated. Repeat BG from 12/10 with NGTD. Lives in a multi-level house with bed/bath upstairs and FF to bedroom with bilateral handrail  2SH, 4 AWRREN with RHR  Independent with mobility, ADLs, and IADLs. Lives with his spouse. PT: Tj bed mobility, Sup-Tj transfers, Tj ambulation 15' with RW   OT: Sup grooming, Tj UB bathing, modA LB bathing, Tj UB dressing, maxA LB dressing, Sup toileting  SLP: Mod-severe dysarthria, mod oral and mod-severe pharyngeal dysphagia Pureed/HTL. ASSESSMENT:  79yo M with L BG and lateral midbrain IPH 2/2 hypertension with subsequent weakness, motor planning, dysphagia. Course c/b AMS, difficult to control BP (Requiring IV meds). RECOMMENDATIONS:  Once medically stable, suspect patient is an appropriate candidate for acute inpatient rehab requiring close physician monitoring given fluctuant mental status more recently, continued HTN in setting of IPH, constipation, recent urinary retention, risk of aspiration, risk of delirium, risk of VTE, risk of skin breakdown, and risk of falls. He could tolerate 3 hours of therapy 5-7x/week with goals to dispo back home. He may need additional assistance, unsure if son or wife is able to provide tat at his time. Wife was recently beeing treated in the hospital.     The patient's preference is [facility]    11-20 minutes, >50% of the total time devoted to medical consultative verbal/EMR discussion between providers. Written report will be generated in the EMR.     Romulo Prieto MD

## 2023-12-13 NOTE — OCCUPATIONAL THERAPY NOTE
Occupational Therapy Progress Note     Patient Name: Madhu Zuniga  [id number] Date: 12/13/2023  Problem List  Principal Problem:    Intraparenchymal hemorrhage of brain [facility])  Active Problems:    Diabetes mellitus ([address])    Hypertension    Class 3 severe obesity with body mass index (BMI) of 40.0 to 44.9 in adult [facility])    Benign prostatic hyperplasia without urinary obstruction    Stage 3a chronic kidney disease (HCC)    Tachypnea    Dysphagia    Positive blood culture    Acute metabolic encephalopathy       12/13/23 1350   Note Type   Note Type Treatment   Pain Assessment   Pain Assessment Tool 0-10   Pain Score No Pain   Restrictions/Precautions   Other Precautions Chair Alarm; Bed Alarm; Fall Risk   ADL   Grooming Assistance   (Steadying assist)   Grooming Comments Pt able to wash hands with BUE forearm support on sink @ Steadying assist   LB Dressing Assistance 3  Moderate Assistance   LB Dressing Deficit Setup; Requires assistive device for steadying;Verbal cueing; Increased time to complete; Don/doff L sock; Don/doff R sock   LB Dressing Comments Pt required assist to don socks while seated in chair, able to complete simulated clothing management while standing with Min A to maintain standing balance   Transfers   Sit to Stand   (Steadying assist)   Additional items Assist x 1; Increased time required;Verbal cues   Stand to Sit   (Steadying assist)   Additional items Assist x 1; Increased time required;Verbal cues   Stand pivot   (Steadying assist)   Additional items Assist x 1; Increased time required;Verbal cues   Additional Comments Pt seated OOB in chair at beginning of session. STS from chair to [address] @ Steadying assist. Pt seated OOB in chair at end of session with PT present and all needs met. Functional Mobility   Additional Comments Pt able to complete functional mobility around room and to/from sink with RW @ Steadying assist. Occassional cues for RW managemetn required throughout.    Cognition   Overall Cognitive Status WFL   Arousal/Participation Alert; Responsive; Cooperative   Attention Within functional limits   Orientation Level Oriented X4   Memory Within functional limits   Following Commands Follows one step commands with increased time or repetition   Activity Tolerance   Activity Tolerance Patient tolerated treatment well   Medical Staff Made Aware Spoke with PT Lauryn James and SUMI Rogers   Assessment   Assessment Pt completed OT tx session #3 focused on ADL performance and functional mobility. Pt alert and agreeable to participate. Pt demonstrated improvements in standing tolerance/balance and ADL performance this session but continues to be limited by fatigue and hypertension. Pt currently completing UB ADLs @ S/set-up, LB ADLs @ Mod A, and functional mobility with RW @ Steadying assist. Pt demonstrating Good participation and Good potential to achieve goals but is currently demonstrating deficits in decrease activity tolerance, decrease standing balance, decrease performance during ADL tasks, decrease safety awareness , decrease BUE ROM, decrease UB MS, decrease generalized strength, decrease activity engagement, and decrease performance during functional transfers. Continue to recommend Level I: Maximum Resource Intensity Therapy upon discharge to increase safety and independence in ADL tasks and functional mobility.    Plan   Treatment Interventions ADL retraining;Functional transfer training   Goal Expiration Date 12/21/23   OT Treatment Day 3   OT Frequency 3-5x/wk   Discharge Recommendation   Rehab Resource Intensity Level, OT I (Maximum Resource Intensity)   AM-PAC Daily Activity Inpatient   Lower Body Dressing 2   Bathing 2   Toileting 3   Upper Body Dressing 3   Grooming 3   Eating 3   Daily Activity Raw Score 16   Daily Activity Standardized Score (Calc for Raw Score >=11) 35.96   AM-PAC Applied Cognition Inpatient   Following a Speech/Presentation 3   Understanding Ordinary Conversation 4   Taking Medications 3   Remembering Where Things Are Placed or Put Away 3   Remembering List of 4-5 Errands 3   Taking Care of Complicated Tasks 3   Applied Cognition Raw Score 19   Applied Cognition Standardized Score 39.77        12/13/23 1400 12/13/23 1402   Vitals   Pulse 57 60   Blood Pressure 148/59 146/62   MAP (mmHg) 89 90   BP Location Right arm Left arm   Patient Position - Orthostatic VS Sitting  (post mobility) Sitting  (post mobility)   Oxygen Therapy   SpO2 96 % 96 %     The patient's raw score on the -PAC Daily Activity Inpatient Short Form is 16. A raw score of less than 19 suggests the patient may benefit from discharge to post-acute rehabilitation services. Please refer to the recommendation of the Occupational Therapist for safe discharge planning. Pt goals to be met by 12/21/2023:     Pt will demonstrate ability to complete grooming/hygiene tasks @ mod I after set-up. Pt will demonstrate ability to complete supine<>sit @ mod I in order to increase safety and independence during ADL tasks. Pt will demonstrate ability to complete UB ADLs including washing/dressing @ mod I in order to increase performance and participation during meaningful tasks  Pt will demonstrate ability to complete LB dressing @ mod I in order to increase safety and independence during meaningful tasks. Pt will demonstrate ability to mane/doff socks/shoes while sitting EOB/chair @ mod I in order to increase safety and independence during meaningful tasks. Pt will demonstrate ability to complete toileting tasks including CM and pericare @ mod I in order to increase safety and independence during meaningful tasks. Pt will demonstrate ability to complete EOB, chair, toilet/commode transfers @ mod I in order to increase performance and participation during functional tasks. Pt will demonstrate ability to stand for 10 minutes while maintaining F+ balance with use of RW for UB support PRN.   Pt will demonstrate ability to tolerate 30 minute OT session with no vc'ing for deep breathing or use of energy conservation techniques in order to increase activity tolerance during functional tasks. Pt will demonstrate Good carryover of use of energy conservation/compensatory strategies during ADLs and functional tasks in order to increase safety and reduce risk for falls. Pt will demonstrate Good attention and participation in continued evaluation of functional ambulation house hold distances in order to assist with safe d/c planning. Pt will attend to continued cognitive assessments 100% of the time in order to provide most appropriate d/c recommendations. Pt will follow 100% simple 2-step commands and be A&O x4 consistently with environmental cues to increase participation in functional activities. Pt will identify 3 areas of interest/hobbies and 1 intervention on how to incorporate into daily life in order to increase interaction with environment and peers as well as increase participation in meaningful tasks. Pt will demonstrate 100% carryover of BUE HEP in order to increase BUE MS and increase performance during functional tasks upon d/c home.     Bhavya Hills, OTR/L

## 2023-12-13 NOTE — ASSESSMENT & PLAN NOTE
Patient is a 78-year-old male with medical history pertinent for hypertension diabetes GERD and BPH presenting with dizziness, increasing weakness and garbled speech. Last seen well 12/5/23 11AM, refused to go to hospital until he fell 12/6/23 w/o injury. Recently tx w/azithro outpt for bronchitis. ER stroke alert called-pressure was 191/89 with aspirin yesterday NIH score of 2 and her CT revealed a small acute left basal ganglia and lateral midbrain IPH mild vasogenic edema. Repeat CT head 1 & 6h without interval IPH changes. No LVO spot signs, no vascular malformations  Repeat CT of head prn for new or worsening neuro deficits  BP goal 120-160  Cardene drip stopped 12/9  Currently on lopressor BID, Norvasc, Losartan, and PRN hydralazine 10mg IV  Elevate HOB 30+ degrees  Neurology is following  MRI 12/7: Stable left thalamic hemorrhage and surrounding vasogenic edema. Foci of chronic microhemorrhage in the left thalamus and left temporal subcortical white matter. 2. No evidence of solid lesion underlying the left thalamic hemorrhage. Of note, NO hemorrhage shock   Consider outpatient MRI in 8 to 12 weeks  Started DVT prophylaxis 12/9/23  PT/OT consulted - recommending rehab  Discussed with neurology on 12/11: continue with current treatment plan - keep BP <160 and go to rehab as PT/OT recommends. Was RRT in AM of 12/12 for confusion, repeat CT head: Stable left thalamic hematoma extending into the cerebral peduncle, BP was elevated, labs unremarkable. Critical care ordered hydralazine IV   Increased BP, patient's mental status returned to baseline shortly after. Discussed with neuro, recommending normotension for BP.

## 2023-12-14 VITALS
HEIGHT: 70 IN | SYSTOLIC BLOOD PRESSURE: 152 MMHG | OXYGEN SATURATION: 94 % | BODY MASS INDEX: 40.4 KG/M2 | RESPIRATION RATE: 20 BRPM | TEMPERATURE: 97.7 F | HEART RATE: 55 BPM | DIASTOLIC BLOOD PRESSURE: 64 MMHG | WEIGHT: 282.19 LBS

## 2023-12-14 PROBLEM — I10 HYPERTENSION: Chronic | Status: RESOLVED | Noted: 2023-12-06 | Resolved: 2023-12-14

## 2023-12-14 PROBLEM — R78.81 POSITIVE BLOOD CULTURE: Status: RESOLVED | Noted: 2023-12-10 | Resolved: 2023-12-14

## 2023-12-14 LAB
ANION GAP SERPL CALCULATED.3IONS-SCNC: 7 MMOL/L
BUN SERPL-MCNC: 22 MG/DL (ref 5–25)
CALCIUM SERPL-MCNC: 8.7 MG/DL (ref 8.4–10.2)
CHLORIDE SERPL-SCNC: 105 MMOL/L (ref 96–108)
CO2 SERPL-SCNC: 27 MMOL/L (ref 21–32)
CREAT SERPL-MCNC: 1.13 MG/DL (ref 0.6–1.3)
ERYTHROCYTE [DISTWIDTH] IN BLOOD BY AUTOMATED COUNT: 15.3 % (ref 11.6–15.1)
GFR SERPL CREATININE-BSD FRML MDRD: 58 ML/MIN/1.73SQ M
GLUCOSE SERPL-MCNC: 107 MG/DL (ref 65–140)
GLUCOSE SERPL-MCNC: 115 MG/DL (ref 65–140)
GLUCOSE SERPL-MCNC: 165 MG/DL (ref 65–140)
HCT VFR BLD AUTO: 31.5 % (ref 36.5–49.3)
HGB BLD-MCNC: 9.7 G/DL (ref 12–17)
MCH RBC QN AUTO: 26.6 PG (ref 26.8–34.3)
MCHC RBC AUTO-ENTMCNC: 30.8 G/DL (ref 31.4–37.4)
MCV RBC AUTO: 86 FL (ref 82–98)
PLATELET # BLD AUTO: 194 THOUSANDS/UL (ref 149–390)
PMV BLD AUTO: 10.1 FL (ref 8.9–12.7)
POTASSIUM SERPL-SCNC: 4.1 MMOL/L (ref 3.5–5.3)
RBC # BLD AUTO: 3.65 MILLION/UL (ref 3.88–5.62)
SODIUM SERPL-SCNC: 139 MMOL/L (ref 135–147)
WBC # BLD AUTO: 9.39 THOUSAND/UL (ref 4.31–10.16)

## 2023-12-14 PROCEDURE — 80048 BASIC METABOLIC PNL TOTAL CA: CPT

## 2023-12-14 PROCEDURE — 90677 PCV20 VACCINE IM: CPT | Performed by: PHYSICIAN ASSISTANT

## 2023-12-14 PROCEDURE — 85027 COMPLETE CBC AUTOMATED: CPT

## 2023-12-14 PROCEDURE — C9113 INJ PANTOPRAZOLE SODIUM, VIA: HCPCS | Performed by: PHYSICIAN ASSISTANT

## 2023-12-14 PROCEDURE — 82948 REAGENT STRIP/BLOOD GLUCOSE: CPT

## 2023-12-14 PROCEDURE — 99239 HOSP IP/OBS DSCHRG MGMT >30: CPT | Performed by: PHYSICIAN ASSISTANT

## 2023-12-14 PROCEDURE — 94668 MNPJ CHEST WALL SBSQ: CPT

## 2023-12-14 RX ORDER — AMLODIPINE BESYLATE 5 MG/1
10 TABLET ORAL DAILY
Qty: 30 TABLET | Refills: 0 | Status: SHIPPED | OUTPATIENT
Start: 2023-12-14

## 2023-12-14 RX ORDER — HYDRALAZINE HYDROCHLORIDE 50 MG/1
50 TABLET, FILM COATED ORAL EVERY 8 HOURS SCHEDULED
Qty: 120 TABLET | Refills: 0 | Status: SHIPPED | OUTPATIENT
Start: 2023-12-14

## 2023-12-14 RX ADMIN — PANTOPRAZOLE SODIUM 40 MG: 40 INJECTION, POWDER, FOR SOLUTION INTRAVENOUS at 05:00

## 2023-12-14 RX ADMIN — AMLODIPINE BESYLATE 10 MG: 10 TABLET ORAL at 08:33

## 2023-12-14 RX ADMIN — LOSARTAN POTASSIUM 100 MG: 50 TABLET, FILM COATED ORAL at 08:33

## 2023-12-14 RX ADMIN — HYDRALAZINE HYDROCHLORIDE 50 MG: 25 TABLET ORAL at 13:42

## 2023-12-14 RX ADMIN — INSULIN LISPRO 1 UNITS: 100 INJECTION, SOLUTION INTRAVENOUS; SUBCUTANEOUS at 11:47

## 2023-12-14 RX ADMIN — PNEUMOCOCCAL 20-VALENT CONJUGATE VACCINE 0.5 ML
2.2; 2.2; 2.2; 2.2; 2.2; 2.2; 2.2; 2.2; 2.2; 2.2; 2.2; 2.2; 2.2; 2.2; 2.2; 2.2; 4.4; 2.2; 2.2; 2.2 INJECTION, SUSPENSION INTRAMUSCULAR at 12:27

## 2023-12-14 RX ADMIN — METOPROLOL TARTRATE 50 MG: 50 TABLET, FILM COATED ORAL at 08:33

## 2023-12-14 RX ADMIN — LEVOTHYROXINE SODIUM 200 MCG: 100 TABLET ORAL at 04:59

## 2023-12-14 RX ADMIN — HYDRALAZINE HYDROCHLORIDE 50 MG: 25 TABLET ORAL at 04:59

## 2023-12-14 RX ADMIN — HEPARIN SODIUM 5000 UNITS: 5000 INJECTION INTRAVENOUS; SUBCUTANEOUS at 08:33

## 2023-12-14 NOTE — PLAN OF CARE
Problem: Potential for Falls  Goal: Patient will remain free of falls  Description: INTERVENTIONS:  - Educate patient/family on patient safety including physical limitations  - Instruct patient to call for assistance with activity   - Consult OT/PT to assist with strengthening/mobility   - Keep Call bell within reach  - Keep bed low and locked with side rails adjusted as appropriate  - Keep care items and personal belongings within reach  - Initiate and maintain comfort rounds  - Make Fall Risk Sign visible to staff  - Offer Toileting every 2 Hours, in advance of need if unable to reposition self  - Initiate/Maintain bed/chair alarm  - Apply yellow socks and bracelet for high fall risk patients  - Consider moving patient to room near nurses station  Outcome: Progressing

## 2023-12-14 NOTE — ASSESSMENT & PLAN NOTE
Lab Results   Component Value Date    EGFR 58 12/14/2023    EGFR 63 12/13/2023    EGFR 58 12/12/2023    CREATININE 1.13 12/14/2023    CREATININE 1.06 12/13/2023    CREATININE 1.13 12/12/2023     His baseline creatinine is 1.4-1.5 at baseline

## 2023-12-14 NOTE — NURSING NOTE
Report called to [facility]. Reviewed discharge instructions, med rec, and assessment . advised Covid/rsv/flu swab not obtained patient already was picked up by transport prior to order placed, per Annette sanchez swab patient .

## 2023-12-14 NOTE — DISCHARGE SUMMARY
[address] [facility]  Discharge- [facility] 1940, 78 y.o. male MRN: [id number]  Unit/Bed#: -Sandi Encounter: [id number]  Primary Care Provider: Georgia Jackson MD   Date and time admitted to hospital: 12/6/2023  5:45 PM    * Intraparenchymal hemorrhage of brain [facility])  Assessment & Plan  Patient is a 56-year-old male with medical history pertinent for hypertension diabetes GERD and BPH presenting with dizziness, increasing weakness and garbled speech. Last seen well 12/5/23 11AM, refused to go to hospital until he fell 12/6/23 w/o injury. Recently tx w/azithro outpt for bronchitis. CT revealed a small acute left basal ganglia and lateral midbrain IPH mild vasogenic edema. Repeat CT head 1 & 6h without interval IPH changes. No LVO spot signs, no vascular malformations  SBP goal 120-160 mmHg  Neurology is following; No ASA. [facility] for DVT ppx while IP. Cont statin  MRI 12/7: Stable left thalamic hemorrhage and surrounding vasogenic edema. Foci of chronic microhemorrhage in the left thalamus and left temporal subcortical white matter. 2. No evidence of solid lesion underlying the left thalamic hemorrhage.  Consider outpatient MRI in 8 to 12 weeks  PT/OT consulted - recommending rehab    Hypertension  Assessment & Plan  Goal is normotension  Home norvasc increased from 5 mg qd to 10 mg qd  Resume valsartan on dc  Resume home Toprol on dc  Started hydralazine 50 mg TID this admit, continue on dc    Dysphagia  Assessment & Plan  SLP following  S/p VBS 12/8- Cleared for pureed diet with HTL  Continue to monitor for risk of aspiration     Stage 3a chronic kidney disease [facility])  Assessment & Plan  Lab Results   Component Value Date    EGFR 58 12/14/2023    EGFR 63 12/13/2023    EGFR 58 12/12/2023    CREATININE 1.13 12/14/2023    CREATININE 1.06 12/13/2023    CREATININE 1.13 12/12/2023     His baseline creatinine is 1.4-1.5 at baseline    Benign prostatic hyperplasia without urinary obstruction  Assessment & Plan  Continue Flomax 0.4 mg p.o. Discussed with urology - as patient is not ambulating much, hold on voiding trial, can follow up outpatient for voiding trial or do voiding trial at rehab    Diabetes mellitus [facility])  Assessment & Plan  Lab Results   Component Value Date    HGBA1C 6.3 (H) 12/07/2023       Recent Labs     12/13/23  1101 12/13/23  1548 12/13/23  2050 12/14/23  0741   POCGLU 211* 114 114 107         Blood Sugar Average: Last 72 hrs:  (P) [id number]    Resume metformin on dc    Positive blood culture-resolved as of 12/14/2023  Assessment & Plan  Contamination  No Abx      Medical Problems       Resolved Problems  Date Reviewed: 12/13/2023            Resolved    Positive blood culture 12/14/2023     Resolved by  Isabelle Petersen PA-C        Discharging Physician / Practitioner: Isabelle Petersen PA-C  PCP: Kim Bah MD  Admission Date:   Admission Orders (From admission, onward)       Ordered        12/06/23 1916  INPATIENT ADMISSION  Once                          Discharge Date: 12/14/23    Consultations During Hospital Stay:  Neurology, critical care, PT, OT, ST, CM    Procedures Performed:   EKG  NSR w/ PACs  Left axis deviation  Right bundle branch block  Septal infarct , age undetermined  Possible Lateral infarct , age undetermined  Abnormal ECG  When compared with ECG of 07-DEC-2023 01:22,  Premature ventricular complexes are now Present  Vent. rate has decreased BY  42 BPM  Confirmed by Heavenly Painter ([id number]) on 12/12/2023 8:37:42 AM    ECHO 12/11/2023    Left Ventricle: Left ventricular cavity size is normal. Wall thickness is normal. The left ventricular ejection fraction is 65%. Systolic function is normal. Wall motion is normal. Diastolic function is normal.    Aortic Valve: The leaflets are moderately thickened. Cannot determine bi- vs trileaflet valve There is mild regurgitation. Aorta: The aortic root is mildly dilated.     Significant Findings / Test Results:   CT head wo contrast   Final Result by Jenna Ybarra MD (12/12 0630)      Stable left thalamic hematoma extending into the cerebral peduncle. Workstation performed: [id number]         XR chest portable   Final Result by Tamiko De La Torre MD (12/10 1636)      No acute cardiopulmonary disease. Workstation performed: [id number]         FL barium swallow video w speech   Final Result by QUIN SCHREIBER (12/08 1520)      FL barium swallow video w speech   Final Result by Lance Urbina, ELOISA (12/08 1528)      MRI brain w wo contrast   Final Result by Vitaliy Sharma MD (12/07 1704)         1. Stable left thalamic hemorrhage and surrounding vasogenic edema. Foci of chronic microhemorrhage in the left thalamus and left temporal subcortical white matter. 2. No evidence of solid lesion underlying the left thalamic hemorrhage. Workstation performed: [id number]         CT head wo contrast   Final Result by Meliton Cabot, MD (12/07 0636)      Stable left thalamic hematoma. 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 Final report is in agreement with preliminary reading by Dr. Naye Travis from [address] [place] [address]  on 12/7/2023 at 3:59 a.m. Workstation performed: [id number]         CT head wo contrast   Final Result by Vitaliy Sharma MD (12/06 2138)      Stable left thalamic hemorrhage and mild surrounding vasogenic edema. Workstation performed: [id number]         CTA stroke alert (head/neck)   Final Result by Vitaliy Sharma MD (12/06 1916)      No stenosis, dissection or occlusion of the carotid or vertebral arteries or major vessels of the [place]. No intracranial aneurysm or vascular malformation identified.             I personally discussed this study with Prieto Prescott and LLOYD GRIFFITHS  on 12/6/2023 7:11 PM.                           Workstation performed: [id number]         CT chest abdomen pelvis w contrast   Final Result by Gorge Birmingham DO (12/06 1918)      1. Mild emphysema. No consolidation or mass. 2. 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 Recommendation is for follow-up low radiation dose chest CT without contrast in one year. 3. Two nonobstructing calculi in the distal right ureter, the more cephalad calculus measuring at least 1 cm, the more inferior calculus measures about 6 mm. No hydroureter or hydronephrosis. Additional incidental findings as above. The study was marked in [facility] for follow-up. Workstation performed: [id number]         CT stroke alert brain   Final Result by Felipe Mondragon MD (12/06 1916)         1. Acute hemorrhage centered in the left thalamus and cerebral peduncle measuring 1.3 x 1.3 x 1.5 cm. Minimal surrounding vasogenic edema. I personally discussed this study with Liliana Underwood and LLOYD GRIFFITHS on 12/6/2023 6:56 PM.            Workstation performed: [id number]           Results Reviewed       Procedure Component Value Units Date/Time    Blood culture #1 [[id number]] Collected: 12/06/23 1758    Lab Status: Final result Specimen: Blood from Hand, Left Updated: 12/12/23 0001     Blood Culture No Growth After 5 Days.     Blood culture #2 [[id number]]  (Abnormal)  (Susceptibility) Collected: 12/06/23 1756    Lab Status: Final result Specimen: Blood from Arm, Right Updated: 12/10/23 0825     Blood Culture Streptococcus mitis oralis group     Gram Stain Result Gram positive cocci in pairs and chains    Susceptibility       Streptococcus mitis oralis group (1)       Antibiotic Interpretation Microscan   Method Status    ZID Performed  Yes  FERNANDO Final    Penicillin Intermediate 0.250 ug/ml FERNANDO Final    Ceftriaxone ($$) Susceptible 0.38 ug/ml FERNANDO Final    Vancomycin ($) Susceptible 0.38 ug/ml FERNANDO Final                       Blood Culture Identification Panel [[id number]]  (Abnormal) Collected: 12/06/23 1756    Lab Status: Final result Specimen: Blood from Arm, Right Updated: 12/10/23 0825     Streptococcus Detected    Narrative:      Film Array panel tests for 11 gram positive organisms, 15 gram negative organisms, 7 yeast species and 10 resistance genes. UA w Reflex to Microscopic w Reflex to Culture [[id number]]  (Abnormal) Collected: 12/06/23 4601    Lab Status: Final result Specimen: Urine Updated: 12/07/23 0005     Color, UA Yellow     Clarity, UA Clear     Specific Gravity, UA 1.015     pH, UA 6.0     Leukocytes, UA Negative     Nitrite, UA Negative     Protein, UA Negative mg/dl      Glucose, UA Negative mg/dl      Ketones, UA Negative mg/dl      Urobilinogen, UA 0.2 E.U./dl      Bilirubin, UA Negative     Occult Blood, UA Trace-lysed    Procalcitonin [[id number]]  (Normal) Collected: 12/06/23 1756    Lab Status: Final result Specimen: Blood from Arm, Right Updated: 12/06/23 2004     Procalcitonin <0.05 ng/ml     FLU/RSV/COVID - if FLU/RSV clinically relevant [[id number]]  (Normal) Collected: 12/06/23 1756    Lab Status: Final result Specimen: Nares from Nose Updated: 12/06/23 1840     SARS-CoV-2 Negative     INFLUENZA A PCR Negative     INFLUENZA B PCR Negative     RSV PCR Negative    Narrative:      FOR PEDIATRIC PATIENTS - copy/paste COVID Guidelines URL to browser: [email or web address]    SARS-CoV-2 assay is a Nucleic Acid Amplification assay intended for the  qualitative detection of nucleic acid from SARS-CoV-2 in nasopharyngeal  swabs. Results are for the presumptive identification of SARS-CoV-2 RNA. Positive results are indicative of infection with SARS-CoV-2, the virus  causing COVID-19, but do not rule out bacterial infection or co-infection  with other viruses. Laboratories within the [facility] and its  territories are required to report all positive results to the appropriate  public health authorities.  Negative results do not preclude SARS-CoV-2  infection and should not be used as the sole basis for treatment or other  patient management decisions. Negative results must be combined with  clinical observations, patient history, and epidemiological information. This test has not been FDA cleared or approved. This test has been authorized by FDA under an Emergency Use Authorization  (EUA). This test is only authorized for the duration of time the  declaration that circumstances exist justifying the authorization of the  emergency use of an in vitro diagnostic tests for detection of SARS-CoV-2  virus and/or diagnosis of COVID-19 infection under section 564(b)(1) of  the Act, 21 U. S.C. 913MQI-1(G)(5), unless the authorization is terminated  or revoked sooner. The test has been validated but independent review by FDA  and CLIA is pending. Test performed using TriplePulse[employer]: This RT-PCR assay targets N2,  a region unique to SARS-CoV-2. A conserved region in the E-gene was chosen  for pan-Sarbecovirus detection which includes SARS-CoV-2. According to CMS-2020-01-R, this platform meets the definition of high-throughput technology. B-Type Natriuretic Peptide(BNP) [[id number]]  (Normal) Collected: 12/06/23 1756    Lab Status: Final result Specimen: Blood from Arm, Right Updated: 12/06/23 1840     BNP 33 pg/mL     HS Troponin 0hr (reflex protocol) [[id number]]  (Normal) Collected: 12/06/23 1756    Lab Status: Final result Specimen: Blood from Arm, Right Updated: 12/06/23 1835     hs TnI 0hr 6 ng/L     Fingerstick Glucose (POCT) [[id number]]  (Normal) Collected: 12/06/23 1831    Lab Status: Final result Updated: 12/06/23 1834     POC Glucose 133 mg/dl     Lactic acid [[id number]]  (Normal) Collected: 12/06/23 1756    Lab Status: Final result Specimen: Blood from Arm, Right Updated: 12/06/23 1829     LACTIC ACID 1.1 mmol/L     Narrative:      Result may be elevated if tourniquet was used during collection.     Comprehensive metabolic panel [[id number]]  (Abnormal) Collected: 12/06/23 1756    Lab Status: Final result Specimen: Blood from Arm, Right Updated: 12/06/23 1829     Sodium 139 mmol/L      Potassium 4.1 mmol/L      Chloride 104 mmol/L      CO2 29 mmol/L      ANION GAP 6 mmol/L      BUN 23 mg/dL      Creatinine 1.40 mg/dL      Glucose 128 mg/dL      Calcium 9.2 mg/dL      AST 18 U/L      ALT 16 U/L      Alkaline Phosphatase 55 U/L      Total Protein 7.9 g/dL      Albumin 4.2 g/dL      Total Bilirubin 0.61 mg/dL      eGFR 45 ml/min/1.73sq m     Narrative:      [facility] guidelines for Chronic Kidney Disease (CKD):     Stage 1 with normal or high GFR (GFR > 90 mL/min/1.73 square meters)    Stage 2 Mild CKD (GFR = 60-89 mL/min/1.73 square meters)    Stage 3A Moderate CKD (GFR = 45-59 mL/min/1.73 square meters)    Stage 3B Moderate CKD (GFR = 30-44 mL/min/1.73 square meters)    Stage 4 Severe CKD (GFR = 15-29 mL/min/1.73 square meters)    Stage 5 End Stage CKD (GFR <15 mL/min/1.73 square meters)  Note: GFR calculation is accurate only with a steady state creatinine    Magnesium [[id number]]  (Normal) Collected: 12/06/23 1756    Lab Status: Final result Specimen: Blood from Arm, Right Updated: 12/06/23 1829     Magnesium 1.9 mg/dL     Protime-INR [[id number]]  (Normal) Collected: 12/06/23 1756    Lab Status: Final result Specimen: Blood from Arm, Right Updated: 12/06/23 1823     Protime 13.3 seconds      INR 0.98    APTT [[id number]]  (Normal) Collected: 12/06/23 1756    Lab Status: Final result Specimen: Blood from Arm, Right Updated: 12/06/23 1823     PTT 32 seconds     CBC and differential [[id number]]  (Abnormal) Collected: 12/06/23 1756    Lab Status: Final result Specimen: Blood from Arm, Right Updated: 12/06/23 1806     WBC 8.14 Thousand/uL      RBC 5.26 Million/uL      Hemoglobin 14.1 g/dL      Hematocrit 45.8 %      MCV 87 fL      MCH 26.8 pg      MCHC 30.8 g/dL      RDW 15.2 %      MPV 9.9 fL Platelets 035 Thousands/uL      nRBC 0 /100 WBCs      Neutrophils Relative 60 %      Immat GRANS % 0 %      Lymphocytes Relative 29 %      Monocytes Relative 8 %      Eosinophils Relative 2 %      Basophils Relative 1 %      Neutrophils Absolute 4.92 Thousands/µL      Immature Grans Absolute 0.02 Thousand/uL      Lymphocytes Absolute 2.35 Thousands/µL      Monocytes Absolute 0.61 Thousand/µL      Eosinophils Absolute 0.19 Thousand/µL      Basophils Absolute 0.05 Thousands/µL           Incidental Findings:   None    Test Results Pending at Discharge (will require follow up): None     Outpatient Tests Requested:  None    Complications:  None    Reason for Admission: Dizziness, weakness, dysarthria    Hospital Course:   Eduin Yoon is a 78 y.o. male patient with past medical history of hypertension, diabetes, GERD, BPH, obesity, BASILIA who originally presented to the hospital on 12/6/2023 due to dizziness, weakness, dysarthria. Was a stroke alert in the emergency room and CAT scan of the head revealed small acute left basal ganglia and lateral midbrain intraparenchymal hemorrhage with mild vasogenic edema. Patient was treated with IV medications for hypertension and DDAVP for aspirin from home. He was admitted to the ICU and had serial CAT scans of the brain. He had no new neurologic deficits and he was started on chemical DVT prophylaxis without worsening symptoms or change in the CAT scan of the brain. Neurology was consulted and continued on a statin. MRI of the brain was completed showing stable left thalamic hemorrhage with surrounding vasogenic edema as well as chronic microhemorrhage in the left thalamus and left temporal subcortical white matter. There is no evidence of solid lesion. Patient's main goal is for blood pressure control. He still has residual dysarthria with dysphagia treated with speech therapy and his diet on discharge is puréed with honey thick.   Patient needs PT and OT and qualifies for rehab. He needs neurology follow-up in the outpatient setting. Please see above list of diagnoses and related plan for additional information. Condition at Discharge: poor    Discharge Day Visit / Exam:   Subjective:  No acute complaints continues with dysarthria and dysphagia no choking or coughing no confusion  Vitals: Blood Pressure: 152/64 (12/14/23 1340)  Pulse: 55 (12/14/23 1340)  Temperature: 97.7 °F (36.5 °C) (12/14/23 0741)  Temp Source: Temporal (12/12/23 1200)  Respirations: 20 (12/14/23 0741)  Height: 5' 10" (177.8 cm) (12/11/23 1352)  Weight - Scale: 128 kg (282 lb 3 oz) (12/11/23 1352)  SpO2: 94 % (12/14/23 0741)  Exam:   Physical Exam  Vitals and nursing note reviewed. Constitutional:       Appearance: He is not toxic-appearing. HENT:      Head: Normocephalic and atraumatic. Nose: No congestion. Mouth/Throat:      Mouth: Mucous membranes are moist.   Eyes:      Conjunctiva/sclera: Conjunctivae normal.   Cardiovascular:      Rate and Rhythm: Normal rate and regular rhythm. Pulmonary:      Effort: Pulmonary effort is normal.   Abdominal:      Palpations: Abdomen is soft. Musculoskeletal:         General: No swelling. Cervical back: Neck supple. Neurological:      Mental Status: He is alert. Cranial Nerves: Dysarthria and facial asymmetry present. Sensory: No sensory deficit. Motor: Weakness present. Coordination: Coordination abnormal.   Psychiatric:      Comments: No encephalopathy          Discussion with Family: Updated  (son) via phone. Discharge instructions/Information to patient and family:   See after visit summary for information provided to patient and family. Provisions for Follow-Up Care:  See after visit summary for information related to follow-up care and any pertinent home health orders.       Mobility at time of Discharge:   Basic Mobility Inpatient Raw Score: 17  -HLM Goal: 5: Stand one or more mins  JH-HLM Achieved: 4: Move to chair/commode  HLM Goal NOT achieved. Continue to encourage mobility in post discharge setting. Disposition:   Other [address] at [facility] Readmission: none     Discharge Statement:  I spent 55 minutes discharging the patient. This time was spent on the day of discharge. I had direct contact with the patient on the day of discharge. Greater than 50% of the total time was spent examining patient, answering all patient questions, arranging and discussing plan of care with patient as well as directly providing post-discharge instructions. Additional time then spent on discharge activities. Discharge Medications:  See after visit summary for reconciled discharge medications provided to patient and/or family.       **Please Note: This note may have been constructed using a voice recognition system**

## 2023-12-14 NOTE — CASE MANAGEMENT
Case Management Discharge Planning Note    Patient name Dipti Dillon  Location [address] [facility] 317/-41 MRN [id number]  : 1940 Date 2023       Current Admission Date: 2023  Current Admission Diagnosis:Intraparenchymal hemorrhage of brain [facility])   Patient Active Problem List    Diagnosis Date Noted    Acute metabolic encephalopathy     Dysphagia 2023    Tachypnea 2023    Diabetes mellitus ([address] ) 2023    Hypertension 2023    Intraparenchymal hemorrhage of brain ([address] ) 2023    Class 3 severe obesity with body mass index (BMI) of 40.0 to 44.9 in adult [facility]) 2023    Stage 3a chronic kidney disease ([address] [facility]) 2023    Benign prostatic hyperplasia without urinary obstruction 2020      LOS (days): 8  Geometric Mean LOS (GMLOS) (days): 4.60  Days to GMLOS:-3     OBJECTIVE:  Risk of Unplanned Readmission Score: 12.98         Current admission status: Inpatient   Preferred Pharmacy:   [address] [place] [address]  [address] [place], [place] [address]  Phone: [phone] Fax: [phone]    Primary Care Provider: Eulalia Liu MD    Primary Insurance: [facility] [employer]  Secondary Insurance:     DISCHARGE DETAILS:              Pt is medically stable for discharge    CM placing transportation request to [facility] in [address]         As per [employer] [facility] will  pt at 1400 via [address] to transport to [facility]

## 2023-12-14 NOTE — CASE MANAGEMENT
[address] [facility] has received approved authorization from insurance:   Yaryalison Medellin in by Liat BECKETT#  [phone] [id number]  Authorization received for: Acute Rehab  Facility: [address] [place] [address] #: [id number]   Start of Care: 12/14  Next Review Date: 12/21  Continued Stay Care Coordinator: Carli BECKETT#  [phone] [id number]  Submit next review to: [phone]   Care Manager notified: Jose Rojo

## 2023-12-14 NOTE — ASSESSMENT & PLAN NOTE
Patient is a 78-year-old male with medical history pertinent for hypertension diabetes GERD and BPH presenting with dizziness, increasing weakness and garbled speech. Last seen well 12/5/23 11AM, refused to go to hospital until he fell 12/6/23 w/o injury. Recently tx w/azithro outpt for bronchitis. CT revealed a small acute left basal ganglia and lateral midbrain IPH mild vasogenic edema. Repeat CT head 1 & 6h without interval IPH changes. No LVO spot signs, no vascular malformations  SBP goal 120-160 mmHg  Neurology is following; No ASA. [facility] for DVT ppx while IP. Cont statin  MRI 12/7: Stable left thalamic hemorrhage and surrounding vasogenic edema. Foci of chronic microhemorrhage in the left thalamus and left temporal subcortical white matter. 2. No evidence of solid lesion underlying the left thalamic hemorrhage.  Consider outpatient MRI in 8 to 12 weeks  PT/OT consulted - recommending rehab

## 2023-12-14 NOTE — CASE MANAGEMENT
Case Management Discharge Planning Note    Patient name Yuniel Homans  Location /-42 MRN [id number]  : 1940 Date 2023       Current Admission Date: 2023  Current Admission Diagnosis:Intraparenchymal hemorrhage of brain [facility])   Patient Active Problem List    Diagnosis Date Noted    Acute metabolic encephalopathy     Positive blood culture 12/10/2023    Dysphagia 2023    Tachypnea 2023    Diabetes mellitus ([address]) 2023    Hypertension 2023    Intraparenchymal hemorrhage of brain ([address]) 2023    Class 3 severe obesity with body mass index (BMI) of 40.0 to 44.9 in adult [facility]) 2023    Stage 3a chronic kidney disease ([address]) 2023    Benign prostatic hyperplasia without urinary obstruction 2020      LOS (days): 8  Geometric Mean LOS (GMLOS) (days): 4.60  Days to GMLOS:-2.9     OBJECTIVE:  Risk of Unplanned Readmission Score: 12.98         Current admission status: Inpatient   Preferred Pharmacy:   [address] [place] [address] Dr  [address] [place] [address]  Phone: [phone] Fax: [phone]    Primary Care Provider: Harsh Terrell MD    Primary Insurance: [facility] [employer]  Secondary Insurance:     [address] [place] [address] Rd Number: [id number]

## 2023-12-14 NOTE — ASSESSMENT & PLAN NOTE
Lab Results   Component Value Date    HGBA1C 6.3 (H) 12/07/2023       Recent Labs     12/13/23  1101 12/13/23  1548 12/13/23 2050 12/14/23  0741   POCGLU 211* 114 114 107         Blood Sugar Average: Last 72 hrs:  (P) [id number]    Resume metformin on dc

## 2023-12-14 NOTE — DISCHARGE INSTR - AVS FIRST PAGE
Other primary care providers in the area to consider are: [facility] primary care Elsworth Saint MD or Becky Castellanos MD

## 2023-12-14 NOTE — ASSESSMENT & PLAN NOTE
Continue Flomax 0.4 mg p.o.   Discussed with urology - as patient is not ambulating much, hold on voiding trial, can follow up outpatient for voiding trial or do voiding trial at rehab

## 2023-12-14 NOTE — ASSESSMENT & PLAN NOTE
Goal is normotension  Home norvasc increased from 5 mg qd to 10 mg qd  Resume valsartan on dc  Resume home Toprol on dc  Started hydralazine 50 mg TID this admit, continue on dc

## 2023-12-15 LAB
BACTERIA BLD CULT: NORMAL
BACTERIA BLD CULT: NORMAL

## 2023-12-15 NOTE — UTILIZATION REVIEW
NOTIFICATION OF ADMISSION DISCHARGE   This is a Notification of Discharge from [address] [place] [address]. Please be advised that this patient has been discharge from our facility. Below you will find the admission and discharge date and time including the patient’s disposition. UTILIZATION REVIEW CONTACT:  Eduardo Briceño  Utilization   Network Utilization Review Department  Phone: [phone] x carefully listen to the prompts. All voicemails are confidential.  Email: [email or web address]     ADMISSION INFORMATION  PRESENTATION DATE: 12/6/2023  5:45 PM  OBERVATION ADMISSION DATE:   INPATIENT ADMISSION DATE: 12/6/23  7:16 PM   DISCHARGE DATE: 12/14/2023  2:00 PM   DISPOSITION:Non [facility]    Network Utilization Review Department  ATTENTION: Please call with any questions or concerns to [phone] and carefully listen to the prompts so that you are directed to the right person. All voicemails are confidential.   For Discharge needs, contact Care Management DC Support Team at [phone] opt. 2  Send all requests for admission clinical reviews, approved or denied determinations and any other requests to dedicated fax number below belonging to the campus where the patient is receiving treatment.  List of dedicated fax numbers for the Facilities:  [place] DENIALS (Administrative/Medical Necessity) [phone]   DISCHARGE SUPPORT TEAM (Network) [phone]   [facility] (Maternity/NICU/Pediatrics) [phone]   [address]: [place] [place] [address] [place] [address] [phone]   [address] [place] [address]  [facility] [phone]